# Patient Record
Sex: FEMALE | Race: BLACK OR AFRICAN AMERICAN | NOT HISPANIC OR LATINO | Employment: OTHER | URBAN - METROPOLITAN AREA
[De-identification: names, ages, dates, MRNs, and addresses within clinical notes are randomized per-mention and may not be internally consistent; named-entity substitution may affect disease eponyms.]

---

## 2017-01-18 ENCOUNTER — LAB VISIT (OUTPATIENT)
Dept: LAB | Facility: HOSPITAL | Age: 77
End: 2017-01-18
Attending: INTERNAL MEDICINE
Payer: MEDICARE

## 2017-01-18 ENCOUNTER — OFFICE VISIT (OUTPATIENT)
Dept: HEMATOLOGY/ONCOLOGY | Facility: CLINIC | Age: 77
End: 2017-01-18
Payer: MEDICARE

## 2017-01-18 ENCOUNTER — OFFICE VISIT (OUTPATIENT)
Dept: OPHTHALMOLOGY | Facility: CLINIC | Age: 77
End: 2017-01-18
Payer: MEDICARE

## 2017-01-18 VITALS
HEIGHT: 64 IN | BODY MASS INDEX: 28.34 KG/M2 | OXYGEN SATURATION: 98 % | TEMPERATURE: 98 F | WEIGHT: 166 LBS | RESPIRATION RATE: 18 BRPM | HEART RATE: 51 BPM | SYSTOLIC BLOOD PRESSURE: 128 MMHG | DIASTOLIC BLOOD PRESSURE: 78 MMHG

## 2017-01-18 DIAGNOSIS — H52.4 PRESBYOPIA: ICD-10-CM

## 2017-01-18 DIAGNOSIS — H04.123 DRY EYES, BILATERAL: ICD-10-CM

## 2017-01-18 DIAGNOSIS — Z85.3 HISTORY OF LEFT BREAST CANCER: ICD-10-CM

## 2017-01-18 DIAGNOSIS — H52.203 ASTIGMATISM, BILATERAL: ICD-10-CM

## 2017-01-18 DIAGNOSIS — H40.003 GLAUCOMA SUSPECT, BILATERAL: Primary | ICD-10-CM

## 2017-01-18 LAB
ALBUMIN SERPL BCP-MCNC: 3.5 G/DL
ALP SERPL-CCNC: 108 U/L
ALT SERPL W/O P-5'-P-CCNC: 13 U/L
ANION GAP SERPL CALC-SCNC: 11 MMOL/L
AST SERPL-CCNC: 19 U/L
BASOPHILS # BLD AUTO: 0.03 K/UL
BASOPHILS NFR BLD: 0.4 %
BILIRUB SERPL-MCNC: 0.3 MG/DL
BUN SERPL-MCNC: 16 MG/DL
CALCIUM SERPL-MCNC: 10.4 MG/DL
CHLORIDE SERPL-SCNC: 105 MMOL/L
CO2 SERPL-SCNC: 24 MMOL/L
CREAT SERPL-MCNC: 0.7 MG/DL
DIFFERENTIAL METHOD: ABNORMAL
EOSINOPHIL # BLD AUTO: 0.2 K/UL
EOSINOPHIL NFR BLD: 2.5 %
ERYTHROCYTE [DISTWIDTH] IN BLOOD BY AUTOMATED COUNT: 13.4 %
EST. GFR  (AFRICAN AMERICAN): >60 ML/MIN/1.73 M^2
EST. GFR  (NON AFRICAN AMERICAN): >60 ML/MIN/1.73 M^2
GLUCOSE SERPL-MCNC: 51 MG/DL
HCT VFR BLD AUTO: 39 %
HGB BLD-MCNC: 13.5 G/DL
LYMPHOCYTES # BLD AUTO: 3.1 K/UL
LYMPHOCYTES NFR BLD: 45.5 %
MCH RBC QN AUTO: 32.5 PG
MCHC RBC AUTO-ENTMCNC: 34.6 %
MCV RBC AUTO: 94 FL
MONOCYTES # BLD AUTO: 0.4 K/UL
MONOCYTES NFR BLD: 6.4 %
NEUTROPHILS # BLD AUTO: 3.1 K/UL
NEUTROPHILS NFR BLD: 45.2 %
PLATELET # BLD AUTO: 196 K/UL
PMV BLD AUTO: 10.6 FL
POTASSIUM SERPL-SCNC: 3.9 MMOL/L
PROT SERPL-MCNC: 7.6 G/DL
RBC # BLD AUTO: 4.15 M/UL
SODIUM SERPL-SCNC: 140 MMOL/L
WBC # BLD AUTO: 6.9 K/UL

## 2017-01-18 PROCEDURE — 1160F RVW MEDS BY RX/DR IN RCRD: CPT | Mod: S$GLB,,, | Performed by: INTERNAL MEDICINE

## 2017-01-18 PROCEDURE — 3078F DIAST BP <80 MM HG: CPT | Mod: S$GLB,,, | Performed by: INTERNAL MEDICINE

## 2017-01-18 PROCEDURE — 3074F SYST BP LT 130 MM HG: CPT | Mod: S$GLB,,, | Performed by: OPTOMETRIST

## 2017-01-18 PROCEDURE — 99999 PR PBB SHADOW E&M-EST. PATIENT-LVL IV: CPT | Mod: PBBFAC,,, | Performed by: INTERNAL MEDICINE

## 2017-01-18 PROCEDURE — 1126F AMNT PAIN NOTED NONE PRSNT: CPT | Mod: S$GLB,,, | Performed by: INTERNAL MEDICINE

## 2017-01-18 PROCEDURE — 92015 DETERMINE REFRACTIVE STATE: CPT | Mod: S$GLB,,, | Performed by: OPTOMETRIST

## 2017-01-18 PROCEDURE — 99999 PR PBB SHADOW E&M-EST. PATIENT-LVL I: CPT | Mod: PBBFAC,,, | Performed by: OPTOMETRIST

## 2017-01-18 PROCEDURE — 3074F SYST BP LT 130 MM HG: CPT | Mod: S$GLB,,, | Performed by: INTERNAL MEDICINE

## 2017-01-18 PROCEDURE — 99214 OFFICE O/P EST MOD 30 MIN: CPT | Mod: S$GLB,,, | Performed by: INTERNAL MEDICINE

## 2017-01-18 PROCEDURE — 3078F DIAST BP <80 MM HG: CPT | Mod: S$GLB,,, | Performed by: OPTOMETRIST

## 2017-01-18 PROCEDURE — 1157F ADVNC CARE PLAN IN RCRD: CPT | Mod: S$GLB,,, | Performed by: INTERNAL MEDICINE

## 2017-01-18 PROCEDURE — 1159F MED LIST DOCD IN RCRD: CPT | Mod: S$GLB,,, | Performed by: INTERNAL MEDICINE

## 2017-01-18 PROCEDURE — 92012 INTRM OPH EXAM EST PATIENT: CPT | Mod: S$GLB,,, | Performed by: OPTOMETRIST

## 2017-01-18 NOTE — PROGRESS NOTES
HPI     Pt states that she feels an aching in her head and she needs a new   glasses rx. No gtts.        Last edited by Leonardo Callahan, Patient Care Assistant on 1/18/2017 10:37   AM.         Assessment /Plan     For exam results, see Encounter Report.    Glaucoma suspect, bilateral  IOP within normal range today  Monitor 6 months    Dry eyes, bilateral  Recommended pt continue preservative free AT bid-more often PRN    Astigmatism, bilateral  Presbyopia  Eyeglass Final Rx     Eyeglass Final Rx      Sphere Cylinder Axis Add   Right +0.25 +0.50 110 +2.75   Left Merkel +0.50 039 +2.75       Type:  PAL    Expiration Date:  1/19/2018              RTC 6 months for dilation, 24-2VF, gOCT, PRN sooner if any changes or concerns  Recommended pt see PCP ASAP for head pain  Discussed above and all questions were answered.

## 2017-01-18 NOTE — PROGRESS NOTES
Subjective:       Patient ID: Danyelle Garcia is a 76 y.o. female.    Chief Complaint: Follow-up    HPI He is a 76year old  lady who comes for follow up of her previously diagnosed triple negative breast cancer.  on  was diagnosed as having cancer of the left breast and underwent  lumpectomy along with sentinel lymph node biopsy. The pathology report from  the [facility] indicated that the   patient had an invasive ductal carcinoma of the breast measuring 1.5 cm in   diameter.     The ER receptors and the HER-2 kris tests were negative.   She received 4 cycles of adjuvant AC which she tolerated well.  She had radiation therapy.  She has stable, chronic Bell's palsy  She also has HBP , diabetes and elevated cholesterol for which she takes medications    She has a history of chronic back pain which was followed outside the clinic.   She ahs not seen me since Aug 2015.  Says she has been having pains in the left breast ALLERGIES: No known drug allergies.     CURRENT MEDICATIONS: see med card.     PREVIOUS SURGERIES: Cholecystectomy, hysterectomy, lumpectomy and sentinel   lymph node biopsy on 3/17/05.     SOCIAL HISTORY: She is single. She has three children. She lives in [place]. She used to smoke for 20 years averaging 4 to 5 cigarettes a day and  stopped a month ago. She drinks about a six pack of beer a month. She used   to work in [employer].     FAMILY DISEASES: No diabetes or cancer in the family. Father  of a   heart attack.     PAST MEDICAL HISTORY:  1-Breast cancer s/p surgery. radiaion therapy and adjuvant chemo.  2-chronic stable Bell's palsy  3-diabetes mellitus  4-elevated cholesterol  Review of Systems   Constitutional: Negative.    HENT: Negative.    Eyes: Negative.    Respiratory: Negative.  Negative for cough and wheezing.    Cardiovascular: Negative.  Negative for chest pain.   Gastrointestinal: Negative.    Genitourinary:  Negative.    Neurological: Negative.    Psychiatric/Behavioral: Negative.         Objective:      Physical Exam   Constitutional: She is oriented to person, place, and time. She appears well-developed. No distress.   HENT:   Head: Normocephalic.   Right Ear: Tympanic membrane, external ear and ear canal normal.   Left Ear: Tympanic membrane, external ear and ear canal normal.   Nose: Nose normal. Right sinus exhibits no maxillary sinus tenderness and no frontal sinus tenderness. Left sinus exhibits no maxillary sinus tenderness and no frontal sinus tenderness.   Mouth/Throat: Oropharynx is clear and moist and mucous membranes are normal.   Teeth normal.  Gums normal.   Eyes: Conjunctivae and lids are normal. Pupils are equal, round, and reactive to light.   Neck: Normal carotid pulses, no hepatojugular reflux and no JVD present. Carotid bruit is not present. No tracheal deviation present. No thyroid mass and no thyromegaly present.   Cardiovascular: Normal rate, regular rhythm, S1 normal, S2 normal, normal heart sounds and intact distal pulses.  Exam reveals no gallop and no friction rub.    No murmur heard.  Carotid exam normal   Pulmonary/Chest: Effort normal and breath sounds normal. No accessory muscle usage. No respiratory distress. She has no wheezes. She has no rales. She exhibits no tenderness.   Breast exam unremarkable.   Abdominal: Soft. Normal appearance. She exhibits no distension and no mass. There is no splenomegaly or hepatomegaly. There is no tenderness. There is no rebound and no guarding.   Musculoskeletal: Normal range of motion. She exhibits no edema or tenderness.        Right hand: Normal.        Left hand: Normal.       Lymphadenopathy:     She has no cervical adenopathy.     She has no axillary adenopathy.        Right: No inguinal and no supraclavicular adenopathy present.        Left: No inguinal and no supraclavicular adenopathy present.   Neurological: She is alert and oriented to  person, place, and time. She has normal strength. No cranial nerve deficit. Coordination normal.   Chronic Millbury's Palsy   Skin: Skin is warm and dry. No rash noted. She is not diaphoretic. No cyanosis or erythema. No pallor. Nails show no clubbing.   Psychiatric: She has a normal mood and affect. Her behavior is normal. Judgment and thought content normal.       Wt Readings from Last 3 Encounters:   01/18/17 75.3 kg (166 lb 0.1 oz)   11/21/16 72 kg (158 lb 11.7 oz)   07/20/16 72.9 kg (160 lb 11.5 oz)     Temp Readings from Last 3 Encounters:   01/18/17 98.3 °F (36.8 °C) (Oral)   11/21/16 96.8 °F (36 °C) (Tympanic)   07/20/16 97 °F (36.1 °C) (Tympanic)     BP Readings from Last 3 Encounters:   01/18/17 128/78   11/21/16 134/62   07/20/16 140/72     Pulse Readings from Last 3 Encounters:   01/18/17 (!) 51   11/21/16 65   07/20/16 70       Assessment:       1. History of left breast cancer        Plan:       She is doing well. See me in 6 months with a cbc/cmp. Mammograms on April 2017 or so.

## 2017-01-19 ENCOUNTER — CLINICAL SUPPORT (OUTPATIENT)
Dept: SMOKING CESSATION | Facility: CLINIC | Age: 77
End: 2017-01-19
Payer: COMMERCIAL

## 2017-01-19 DIAGNOSIS — F17.200 NICOTINE DEPENDENCE: Primary | ICD-10-CM

## 2017-01-19 PROCEDURE — 99407 BEHAV CHNG SMOKING > 10 MIN: CPT | Mod: S$GLB,,, | Performed by: GENERAL PRACTICE

## 2017-02-02 RX ORDER — MOMETASONE FUROATE 50 UG/1
SPRAY, METERED NASAL
Qty: 17 G | Refills: 11 | Status: SHIPPED | OUTPATIENT
Start: 2017-02-02 | End: 2017-05-26 | Stop reason: SDUPTHER

## 2017-03-29 ENCOUNTER — LAB VISIT (OUTPATIENT)
Dept: LAB | Facility: HOSPITAL | Age: 77
End: 2017-03-29
Attending: FAMILY MEDICINE
Payer: MEDICARE

## 2017-03-29 ENCOUNTER — OFFICE VISIT (OUTPATIENT)
Dept: FAMILY MEDICINE | Facility: CLINIC | Age: 77
End: 2017-03-29
Payer: MEDICARE

## 2017-03-29 VITALS
TEMPERATURE: 96 F | HEART RATE: 72 BPM | RESPIRATION RATE: 18 BRPM | OXYGEN SATURATION: 97 % | WEIGHT: 164.44 LBS | DIASTOLIC BLOOD PRESSURE: 72 MMHG | SYSTOLIC BLOOD PRESSURE: 128 MMHG | HEIGHT: 64 IN | BODY MASS INDEX: 28.07 KG/M2

## 2017-03-29 DIAGNOSIS — Z00.00 ANNUAL PHYSICAL EXAM: Primary | ICD-10-CM

## 2017-03-29 DIAGNOSIS — F17.200 TOBACCO USE DISORDER: ICD-10-CM

## 2017-03-29 DIAGNOSIS — E11.59 HYPERTENSION ASSOCIATED WITH DIABETES: ICD-10-CM

## 2017-03-29 DIAGNOSIS — I15.2 HYPERTENSION ASSOCIATED WITH DIABETES: ICD-10-CM

## 2017-03-29 DIAGNOSIS — E11.51 TYPE 2 DIABETES MELLITUS WITH PERIPHERAL ARTERY DISEASE: ICD-10-CM

## 2017-03-29 DIAGNOSIS — E11.69 COMBINED HYPERLIPIDEMIA ASSOCIATED WITH TYPE 2 DIABETES MELLITUS: ICD-10-CM

## 2017-03-29 DIAGNOSIS — Z78.0 POSTMENOPAUSAL: ICD-10-CM

## 2017-03-29 DIAGNOSIS — E78.2 COMBINED HYPERLIPIDEMIA ASSOCIATED WITH TYPE 2 DIABETES MELLITUS: ICD-10-CM

## 2017-03-29 DIAGNOSIS — E11.49 CONTROLLED TYPE 2 DIABETES MELLITUS WITH OTHER NEUROLOGIC COMPLICATION, UNSPECIFIED LONG TERM INSULIN USE STATUS: ICD-10-CM

## 2017-03-29 DIAGNOSIS — E55.9 VITAMIN D DEFICIENCY: ICD-10-CM

## 2017-03-29 DIAGNOSIS — I73.9 PAD (PERIPHERAL ARTERY DISEASE): ICD-10-CM

## 2017-03-29 DIAGNOSIS — R41.3 MEMORY DEFICIT: ICD-10-CM

## 2017-03-29 DIAGNOSIS — M89.9 DISORDER OF BONE AND CARTILAGE: ICD-10-CM

## 2017-03-29 DIAGNOSIS — M94.9 DISORDER OF BONE AND CARTILAGE: ICD-10-CM

## 2017-03-29 DIAGNOSIS — E78.5 HYPERLIPIDEMIA, UNSPECIFIED HYPERLIPIDEMIA TYPE: ICD-10-CM

## 2017-03-29 DIAGNOSIS — I77.9 CAROTID ARTERY DISEASE, UNSPECIFIED LATERALITY: ICD-10-CM

## 2017-03-29 DIAGNOSIS — C50.912 MALIGNANT NEOPLASM OF LEFT FEMALE BREAST, UNSPECIFIED SITE OF BREAST: ICD-10-CM

## 2017-03-29 LAB
CREAT UR-MCNC: 67 MG/DL
PROT UR-MCNC: 7 MG/DL
PROT/CREAT RATIO, UR: 0.1

## 2017-03-29 PROCEDURE — 84443 ASSAY THYROID STIM HORMONE: CPT

## 2017-03-29 PROCEDURE — 82306 VITAMIN D 25 HYDROXY: CPT

## 2017-03-29 PROCEDURE — 99999 PR PBB SHADOW E&M-EST. PATIENT-LVL IV: CPT | Mod: PBBFAC,,, | Performed by: FAMILY MEDICINE

## 2017-03-29 PROCEDURE — 3074F SYST BP LT 130 MM HG: CPT | Mod: S$GLB,,, | Performed by: FAMILY MEDICINE

## 2017-03-29 PROCEDURE — 36415 COLL VENOUS BLD VENIPUNCTURE: CPT | Mod: PO

## 2017-03-29 PROCEDURE — 99397 PER PM REEVAL EST PAT 65+ YR: CPT | Mod: S$GLB,,, | Performed by: FAMILY MEDICINE

## 2017-03-29 PROCEDURE — 3078F DIAST BP <80 MM HG: CPT | Mod: S$GLB,,, | Performed by: FAMILY MEDICINE

## 2017-03-29 PROCEDURE — 83036 HEMOGLOBIN GLYCOSYLATED A1C: CPT

## 2017-03-29 PROCEDURE — 80061 LIPID PANEL: CPT

## 2017-03-29 RX ORDER — ATORVASTATIN CALCIUM 80 MG/1
80 TABLET, FILM COATED ORAL NIGHTLY
Qty: 90 TABLET | Refills: 1 | Status: SHIPPED | OUTPATIENT
Start: 2017-03-29 | End: 2017-11-01 | Stop reason: SDUPTHER

## 2017-03-29 RX ORDER — LOSARTAN POTASSIUM 50 MG/1
50 TABLET ORAL DAILY
Qty: 90 TABLET | Refills: 1 | Status: SHIPPED | OUTPATIENT
Start: 2017-03-29 | End: 2017-11-01 | Stop reason: SDUPTHER

## 2017-03-29 RX ORDER — GLIPIZIDE 5 MG/1
TABLET ORAL
Qty: 45 TABLET | Refills: 1 | Status: SHIPPED | OUTPATIENT
Start: 2017-03-29 | End: 2017-09-22 | Stop reason: SDUPTHER

## 2017-03-29 NOTE — PROGRESS NOTES
HISTORY OF PRESENT ILLNESS: Ms. Garcia comes in today non fasting and with taking medications for annual wellness exam.      END OF LIFE DECISION: She has a living will and does not desire life support.    She continues to follow with hematologist/oncologist Dr. Finley for breast cancer surveillance with breast exam and mammogram with last visit on January 18, 2017 with 6-month follow up with CBC, CMP advised.     She performs daily AM glucose checks with levels ranging 120's.    She also follows with Dr. Loomis, vascular surgeon for carotid artery disease surveillance, occasionally and reports last visit a few months ago.     She continues to smoke but sporadically and is not interested in smoking cessation program but states she is working on quitting.     Current Outpatient Prescriptions   Medication Sig    alcohol swabs PadM Apply 1 each topically as needed.    atorvastatin (LIPITOR) 80 MG tablet Take 1 tablet (80 mg total) by mouth nightly.    blood glucose control, normal (ACCU-CHEK SMARTVIEW CONTRL SOL) Soln 1 each by Misc.(Non-Drug; Combo Route) route 2 (two) times daily.    blood sugar diagnostic Strp 1 strip by Misc.(Non-Drug; Combo Route) route 2 (two) times daily.    blood-glucose meter kit Use as instructed    calcium carbonate (OS-DAMIAN) 600 mg (1,500 mg) Tab Take 600 mg by mouth 2 (two) times daily with meals.    glipiZIDE (GLUCOTROL) 5 MG tablet TAKE 1/2 TABLET BY MOUTH WITH BREAKFAST    lancets 32 gauge Misc 1 lancet by Misc.(Non-Drug; Combo Route) route 2 (two) times daily.    losartan (COZAAR) 50 MG tablet TAKE 1 TABLET BY MOUTH EVERY DAY    mometasone (NASONEX) 50 mcg/actuation nasal spray USE 2 SPRAYS IN EACH NOSTRIL EVERY DAY    nicotine (NICODERM CQ) 7 mg/24 hr Place 1 patch onto the skin once daily.    nystatin (MYCOSTATIN) cream Apply topically 2 (two) times daily as needed for Dry Skin.    pantoprazole (PROTONIX) 40 MG tablet Take 1 tablet (40 mg total) by mouth once daily.       SCREENINGS:   Cholesterol: April 6, 2016.  FFS/Colonoscopy: August 4, 2015 - benign colon polyp, diverticulosis; repeat in 3 years.  Mammogram (with Dr. Finley): April 6, 2016 - benign. Scheduled April 28, 2017.  GYN Exam (Breast exam) with Dr. Finley: January 18, 2017.   Dexa Scan: August 16, 2013 - osteopenia with moderate fracture risk; repeat in 5 years.   Eye Exam: January 18, 2017 with Dr. Cruz.   Dental Exam: Years ago per patient. She wears top dentures only.  PPD: Negative in the past.  Immunizations: Td/Tdap - less than 10 years ago per patient.  Gardasil - N./A.  Zostavax - June 14, 2012.  Pneumovax - March 26, 2014.  Prevnar-13 shot - March 29, 2016.  Seasonal Flu - November 21, 2016.    ROS:  GENERAL: Denies fever, chills, fatigue or unusual weight change. Weight 72 kg (158 lb 11.7 oz) at November 21, 2016 visit. Reports occasionally walks and monitors diet.   SKIN: Denies rashes, itching, changes in mole, color or texture of skin or easy bruising.  HEAD: Reports occasional, mild headaches but denies history of recent head trauma.  EYES: Denies change in vision, pain, diplopia, redness or discharge. Wears glasses.  EARS: Denies ear pain, discharge, vertigo or decreased hearing.   NOSE: Denies loss of smell, epistaxis or rhinitis.  MOUTH & THROAT: Denies hoarseness or change in voice. Denies excessive gum bleeding or mouth sores. Denies sore throat.  NODES: Denies swollen glands.  CHEST: Denies BRITO, wheezing, cough, or sputum production.  CARDIOVASCULAR: Denies PND, chest pain, orthopnea or reduced exercise tolerance. Denies palpitations. See history of present illness.  ABDOMEN: Denies constipation, diarrhea, nausea, vomiting, abdominal pain, or blood in stool.  URINARY: Denies flank pain, dysuria or hematuria.  GENITOURINARY: Denies flank pain, dysuria, frequency or hematuria. Performs monthly breast self exams. See history of present illness.  ENDOCRINE: Denies thyroid problems. See history  "of present illness.  HEME/LYMPH: Denies bleeding problems.  PERIPHERAL VASCULAR:Denies claudication or cyanosis.  MUSCULOSKELETAL: Denies pain, stiffness, edema.  NEUROLOGIC: Denies history of seizures, tremors, paralysis, alteration of gait or coordination. She reports having memory issue.  PSYCHIATRIC: Denies mood swings, depression, anxiety, homicidal or suicidal thoughts. Denies sleep problems.    PE:   VS: /72 (BP Location: Left arm, Patient Position: Sitting, BP Method: Manual)  Pulse 72  Temp 96.3 °F (35.7 °C) (Tympanic)   Resp 18  Ht 5' 4" (1.626 m)  Wt 74.6 kg (164 lb 7.4 oz)  SpO2 97%  BMI 28.23 kg/m2  APPEARANCE: Well nourished, well developed female, overweight, elderly and pleasant, alert and oriented in no acute distress.   HEAD: Nontender. Full range of motion.  EYES: PERRL, conjunctiva pink, lids no edema. She wears glasses.   EARS: External canal patent, no swelling or redness. TM's shiny and clear.  NOSE: Mucosa and turbinates pink, not swollen. No discharge. Nontender sinuses.  THROAT: No pharyngeal erythema or exudate. No stridor. She has top dentures.   NECK: Supple, no mass, thyroid not enlarged.  NODES: No cervical lymph node enlargement.  CHEST: Normal respiratory effort. Lungs clear to auscultation.  CARDIOVASCULAR: Normal S1, S2. No rubs, murmurs or gallops. PMI not displaced. Chronic left carotid bruit. Feet look okay without ulcerations or skin breaks. Chronic slightly decreased pedal pulses palpable bilaterally. No edema.  ABDOMEN: Bowel sounds present. Not distended. Soft. No tenderness, masses or organomegaly.  BREAST EXAM: Not examined as already performed by hematologist/oncologist.   PELVIC EXAM: Bimanual examination not examined as patient has had JACQUELINE/BSO due to noncancerous reasons. However, normal external female genitalia without skin irritation noted.  RECTAL EXAM: No external hemorrhoids or anal fissures. Heme-negative stool in the rectal vault.  MUSCULOSKELETAL: " No joint deformities or stiffness. She is ambulatory without problems.  SKIN: No rashes or suspicious lesions, normal color and turgor.  NEUROLOGIC: Cranial Nerves: II-XII grossly intact. DTR's: Knees, Ankles 2+ and equal bilaterally. Monofilament test unremarkable. Gait & Posture: Normal gait and fine motion.  PSYCHIATRIC: Patient alert, oriented x 3. Mood/Affect normal without acute anxiety depression noted. Judgment/insight good as she makes appropriate decisions during today's examination but slight decreased memory deficit noted.    Protective Sensation (w/ 10 gram monofilament):  Right: Intact  Left: Intact    Visual Inspection:  Normal -  Bilateral    Pedal Pulses:   Right: Diminshed  Left: Diminshed    Posterior tibialis:   Right:Diminshed  Left: Diminshed     Lab Results   Component Value Date    WBC 6.90 01/18/2017    HGB 13.5 01/18/2017    HCT 39.0 01/18/2017    MCV 94 01/18/2017     01/18/2017     Lab Results   Component Value Date    CREATININE 0.7 01/18/2017    BUN 16 01/18/2017     01/18/2017    K 3.9 01/18/2017     01/18/2017    CO2 24 01/18/2017     Lab Results   Component Value Date    ALT 13 01/18/2017    AST 19 01/18/2017    ALKPHOS 108 01/18/2017    BILITOT 0.3 01/18/2017     Lab Results   Component Value Date    HGBA1C 5.8 11/21/2016     Glucose 70 - 110 mg/dL 51 (L)              01/18//2017       ASSESSMENT:    ICD-10-CM ICD-9-CM    1. Annual physical exam Z00.00 V70.0    2. Hypertension associated with diabetes E11.59 250.80 Lipid panel    I10 401.9 TSH   3. Combined hyperlipidemia associated with type 2 diabetes mellitus E11.69 250.80 Lipid panel    E78.2 272.2    4. Type 2 diabetes mellitus with peripheral artery disease E11.51 250.70 Protein / creatinine ratio, urine     443.81 Hemoglobin A1c   5. PAD (peripheral artery disease) I73.9 443.9    6. Controlled type 2 diabetes mellitus with other neurologic complication, unspecified long term insulin use status E11.49 250.60     7. Vitamin D deficiency E55.9 268.9 Vitamin D   8. Disorder of bone and cartilage M89.9 733.90     M94.9     9. Carotid artery disease, unspecified laterality I77.9 447.9    10. Malignant neoplasm of left female breast, unspecified site of breast C50.912 174.9    11. Tobacco use disorder F17.200 305.1    12. Memory deficit R41.3 780.93 Ambulatory referral to Neurology   13. Postmenopausal Z78.0 V49.81      PLAN:  1. Age-appropriate counseling-appropriate low-sodium, low-cholesterol, low chloride diet and exercise/walks daily as tolerated, monthly breast self exam, annual health screening.  2. Flu shot this fall.  3. Patient advised to call for results.  4. Smoking cessation advised.  5. Keep followup with specialists-vascular surgery, hematology/oncology (for breast cancer surveillance).  6. See me in 3 months for diabetes and hypertension followup.  7. Prescription refills - Lipitor 80 mg nightly, Cozaar 50 mg daily, Glipizide 5 mg daily x 6 months.

## 2017-03-29 NOTE — MR AVS SNAPSHOT
[facility]  [address] [address]  Phone: [phone]                  Danyelle Garcia   3/29/2017 10:30 AM   Office Visit    Description:  Female : 1940   Provider:  Dilma Chan MD   Department:  [facility]           Reason for Visit     Annual Exam           Diagnoses this Visit        Comments    Annual physical exam    -  Primary     Hypertension associated with diabetes         Combined hyperlipidemia associated with type 2 diabetes mellitus         Type 2 diabetes mellitus with peripheral artery disease         PAD (peripheral artery disease)         Controlled type 2 diabetes mellitus with other neurologic complication, unspecified long term insulin use status         Vitamin D deficiency         Carotid artery disease, unspecified laterality         Tobacco use disorder         Malignant neoplasm of left female breast, unspecified site of breast         Postmenopausal         Memory deficit         Hyperlipidemia, unspecified hyperlipidemia type                To Do List           Future Appointments        Provider Department Dept Phone    3/29/2017 11:50 AM LABORATORY, [address] [facility] [phone]    2017 9:00 AM [facility] [phone]    2017 8:00 AM Zoila Mason MD [facility] Neurology [phone]    2017 8:15 AM Dilma Chan MD [facility] [phone]    2017 9:00 AM LABORATORY, [facility] [phone]      Goals (5 Years of Data)     None      Follow-Up and Disposition     Return in about 3 months (around 2017) for diabetes, blood pressure follow up.       These Medications        Disp Refills Start End    atorvastatin (LIPITOR) 80 MG tablet 90 tablet 1 3/29/2017     Take 1 tablet (80 mg total) by mouth nightly. - Oral    Pharmacy: [facility] [id number] [place] [address]  [place] - [address] AT [facility] Ph #: [phone]       Notes to Pharmacy: **Patient requests 90 day supply**    glipiZIDE (GLUCOTROL) 5 MG tablet 45 tablet 1 3/29/2017     TAKE 1/2 TABLET BY MOUTH WITH BREAKFAST    [facility] [address] AT [facility] Ph #: [phone]       losartan (COZAAR) 50 MG tablet 90 tablet 1 3/29/2017     Take 1 tablet (50 mg total) by mouth once daily. - Oral    [facility] [facility] [address] AT [facility] Ph #: [phone]         [facility] On Call     [facility] On Call Nurse Care Line - 24/7 Assistance  Registered nurses in the [facility] provide clinical advisement, health education, appointment booking, and other advisory services.  Call for this free service at [phone].             Medications           Message regarding Medications     Verify the changes and/or additions to your medication regime listed below are the same as discussed with your clinician today.  If any of these changes or additions are incorrect, please notify your healthcare provider.        CHANGE how you are taking these medications     Start Taking Instead of    losartan (COZAAR) 50 MG tablet losartan (COZAAR) 50 MG tablet    Dosage:  Take 1 tablet (50 mg total) by mouth once daily. Dosage:  TAKE 1 TABLET BY MOUTH EVERY DAY    Reason for Change:  Reorder            Verify that the below list of medications is an accurate representation of the medications you are currently taking.  If none reported, the list may be blank. If incorrect, please contact your healthcare provider. Carry this list with you in case of emergency.           Current Medications     alcohol swabs PadM Apply 1 each topically as needed.    atorvastatin (LIPITOR) 80 MG tablet Take 1 tablet (80 mg total) by mouth nightly.    blood glucose control, normal (ACCU-CHEK SMARTVIEW CONTRL SOL) Soln 1 each by  "Misc.(Non-Drug; Combo Route) route 2 (two) times daily.    blood sugar diagnostic Strp 1 strip by Misc.(Non-Drug; Combo Route) route 2 (two) times daily.    blood-glucose meter kit Use as instructed    calcium carbonate (OS-DAMIAN) 600 mg (1,500 mg) Tab Take 600 mg by mouth 2 (two) times daily with meals.    glipiZIDE (GLUCOTROL) 5 MG tablet TAKE 1/2 TABLET BY MOUTH WITH BREAKFAST    lancets 32 gauge Misc 1 lancet by Misc.(Non-Drug; Combo Route) route 2 (two) times daily.    losartan (COZAAR) 50 MG tablet Take 1 tablet (50 mg total) by mouth once daily.    mometasone (NASONEX) 50 mcg/actuation nasal spray USE 2 SPRAYS IN EACH NOSTRIL EVERY DAY    nicotine (NICODERM CQ) 7 mg/24 hr Place 1 patch onto the skin once daily.    nystatin (MYCOSTATIN) cream Apply topically 2 (two) times daily as needed for Dry Skin.    pantoprazole (PROTONIX) 40 MG tablet Take 1 tablet (40 mg total) by mouth once daily.           Clinical Reference Information           Your Vitals Were     BP Pulse Temp Resp    128/72 (BP Location: Left arm, Patient Position: Sitting, BP Method: Manual) 72 96.3 °F (35.7 °C) (Tympanic) 18    Height Weight SpO2 BMI    5' 4" (1.626 m) 74.6 kg (164 lb 7.4 oz) 97% 28.23 kg/m2      Blood Pressure          Most Recent Value    BP  128/72      Allergies as of 3/29/2017     No Known Allergies      Immunizations Administered on Date of Encounter - 3/29/2017     None      Orders Placed During Today's Visit      Normal Orders This Visit    Ambulatory referral to Neurology     Protein / creatinine ratio, urine     Future Labs/Procedures Expected by Expires    Hemoglobin A1c  3/29/2017 5/28/2018    Lipid panel  3/29/2017 5/28/2018    TSH  3/29/2017 5/28/2018    Vitamin D  3/29/2017 5/28/2018      [facility] Sign-Up     Activating your [facility] account is as easy as 1-2-3!     1) Visit [email or web address], select Sign Up Now, enter this activation code and your date of birth, then select Next.  [email or web address]  Expires: " 5/13/2017 11:48 AM      2) Create a username and password to use when you visit [facility] in the future and select a security question in case you lose your password and select Next.    3) Enter your e-mail address and click Sign Up!    Additional Information  If you have questions, please e-mail [email or web address] or call [phone] to talk to our [facility] staff. Remember, [facility] is NOT to be used for urgent needs. For medical emergencies, dial 911.         Instructions    Please call Dr. Chan for your results.    Thanks.       Smoking Cessation     If you would like to quit smoking:   You may be eligible for free services if you are a [place] resident and started smoking cigarettes before September 1, 1988.  Call the Smoking Cessation [employer] (SCT) toll free at (201) 330-6678 or (682) 039-0038.   Call 0-321-QUIT-NOW if you do not meet the above criteria.            Language Assistance Services     ATTENTION: Language assistance services are available, free of charge. Please call [phone].      ATENCIÓN: Si habla luis miguel, tiene a al disposición servicios gratuitos de asistencia lingüística. Llame al [phone].     CHÚ Ý: N?u b?n nói Ti?ng Vi?t, có các d?ch v? h? tr? ngôn ng? mi?n phí dành cho b?n. G?i s? [phone].         [facility] complies with applicable Federal civil rights laws and does not discriminate on the basis of race, color, national origin, age, disability, or sex.

## 2017-03-30 LAB
25(OH)D3+25(OH)D2 SERPL-MCNC: 20 NG/ML
CHOLEST/HDLC SERPL: 3.7 {RATIO}
ESTIMATED AVG GLUCOSE: 126 MG/DL
HBA1C MFR BLD HPLC: 6 %
HDL/CHOLESTEROL RATIO: 27.1 %
HDLC SERPL-MCNC: 210 MG/DL
HDLC SERPL-MCNC: 57 MG/DL
LDLC SERPL CALC-MCNC: 133.2 MG/DL
NONHDLC SERPL-MCNC: 153 MG/DL
TRIGL SERPL-MCNC: 99 MG/DL
TSH SERPL DL<=0.005 MIU/L-ACNC: 1.25 UIU/ML

## 2017-04-05 PROBLEM — C50.912 MALIGNANT NEOPLASM OF LEFT FEMALE BREAST: Status: ACTIVE | Noted: 2017-04-05

## 2017-04-05 PROBLEM — R41.3 MEMORY DEFICIT: Status: ACTIVE | Noted: 2017-04-05

## 2017-04-05 PROBLEM — I77.9 CAROTID ARTERY DISEASE: Status: ACTIVE | Noted: 2017-04-05

## 2017-04-05 RX ORDER — ERGOCALCIFEROL 1.25 MG/1
50000 CAPSULE ORAL
Qty: 4 CAPSULE | Refills: 5 | Status: SHIPPED | OUTPATIENT
Start: 2017-04-05 | End: 2017-05-26

## 2017-04-28 ENCOUNTER — HOSPITAL ENCOUNTER (OUTPATIENT)
Dept: RADIOLOGY | Facility: HOSPITAL | Age: 77
Discharge: HOME | End: 2017-04-28
Attending: INTERNAL MEDICINE
Payer: MEDICARE

## 2017-04-28 DIAGNOSIS — Z85.3 HISTORY OF LEFT BREAST CANCER: ICD-10-CM

## 2017-05-10 ENCOUNTER — CLINICAL SUPPORT (OUTPATIENT)
Dept: SMOKING CESSATION | Facility: CLINIC | Age: 77
End: 2017-05-10
Payer: COMMERCIAL

## 2017-05-10 DIAGNOSIS — F17.200 NICOTINE DEPENDENCE: Primary | ICD-10-CM

## 2017-05-10 PROCEDURE — 99407 BEHAV CHNG SMOKING > 10 MIN: CPT | Mod: S$GLB,,, | Performed by: INTERNAL MEDICINE

## 2017-05-24 ENCOUNTER — TELEPHONE (OUTPATIENT)
Dept: FAMILY MEDICINE | Facility: CLINIC | Age: 77
End: 2017-05-24

## 2017-05-24 NOTE — TELEPHONE ENCOUNTER
----- Message from Madisyn Shah sent at 5/24/2017  8:33 AM CDT -----  Contact: pt  Please call pt @ [phone] regarding worked in appt before 6/29, pt do not want to see no one else, because they gave her the wrong meds before, pt only want to see her doctor.

## 2017-05-26 ENCOUNTER — OFFICE VISIT (OUTPATIENT)
Dept: FAMILY MEDICINE | Facility: CLINIC | Age: 77
End: 2017-05-26
Payer: MEDICARE

## 2017-05-26 VITALS
OXYGEN SATURATION: 96 % | DIASTOLIC BLOOD PRESSURE: 72 MMHG | RESPIRATION RATE: 18 BRPM | HEIGHT: 64 IN | SYSTOLIC BLOOD PRESSURE: 146 MMHG | HEART RATE: 62 BPM | WEIGHT: 161.63 LBS | TEMPERATURE: 98 F | BODY MASS INDEX: 27.59 KG/M2

## 2017-05-26 DIAGNOSIS — F17.200 TOBACCO USE DISORDER: ICD-10-CM

## 2017-05-26 DIAGNOSIS — J30.2 SEASONAL ALLERGIC RHINITIS, UNSPECIFIED ALLERGIC RHINITIS TRIGGER: ICD-10-CM

## 2017-05-26 DIAGNOSIS — K21.9 GASTROESOPHAGEAL REFLUX DISEASE, ESOPHAGITIS PRESENCE NOT SPECIFIED: ICD-10-CM

## 2017-05-26 DIAGNOSIS — E55.9 VITAMIN D DEFICIENCY: ICD-10-CM

## 2017-05-26 PROCEDURE — 99999 PR PBB SHADOW E&M-EST. PATIENT-LVL IV: CPT | Mod: PBBFAC,,, | Performed by: FAMILY MEDICINE

## 2017-05-26 PROCEDURE — 1126F AMNT PAIN NOTED NONE PRSNT: CPT | Mod: S$GLB,,, | Performed by: FAMILY MEDICINE

## 2017-05-26 PROCEDURE — 99214 OFFICE O/P EST MOD 30 MIN: CPT | Mod: S$GLB,,, | Performed by: FAMILY MEDICINE

## 2017-05-26 PROCEDURE — 1159F MED LIST DOCD IN RCRD: CPT | Mod: S$GLB,,, | Performed by: FAMILY MEDICINE

## 2017-05-26 RX ORDER — MOMETASONE FUROATE 50 UG/1
SPRAY, METERED NASAL
Qty: 17 G | Refills: 11 | Status: SHIPPED | OUTPATIENT
Start: 2017-05-26 | End: 2017-09-27

## 2017-05-26 RX ORDER — PANTOPRAZOLE SODIUM 40 MG/1
40 TABLET, DELAYED RELEASE ORAL DAILY
Qty: 90 TABLET | Refills: 1 | Status: SHIPPED | OUTPATIENT
Start: 2017-05-26 | End: 2018-01-30 | Stop reason: SDUPTHER

## 2017-05-26 RX ORDER — NYSTATIN 100000 U/G
CREAM TOPICAL 2 TIMES DAILY PRN
Qty: 30 G | Refills: 1 | Status: SHIPPED | OUTPATIENT
Start: 2017-05-26 | End: 2018-03-02

## 2017-05-26 NOTE — PROGRESS NOTES
Subjective:       Patient ID: Danyelle Garcia is a 76 y.o. female.    Chief Complaint: Medication Refill    Ms. Garcia comes in today requesting refill of medications - Nasonex, nystatin.    She reports having occasional stomach pain without nausea, vomiting, diarrhea, constipation.  She states she needs Protonix.    She states she has not been able to afford vitamin D 50,000 units weekly which was initially prescribed on April 5, 2017; she states she did not get it filled.    She denies having cough, shortness of breath, wheezing, chest pain, palpitations, leg swelling.      Current Outpatient Prescriptions:  alcohol swabs PadM, Apply 1 each topically as needed.  atorvastatin (LIPITOR) 80 MG tablet, Take 1 tablet (80 mg total) by mouth nightly.  blood glucose control, normal (ACCU-CHEK SMARTVIEW CONTRL SOL) Soln, 1 each by Misc.(Non-Drug; Combo Route) route 2 (two) times daily.  blood sugar diagnostic Strp, 1 strip by Misc.(Non-Drug; Combo Route) route 2 (two) times daily.  blood-glucose meter kit, Use as instructed  calcium carbonate (OS-DAMIAN) 600 mg (1,500 mg) Tab, Take 600 mg by mouth 2 (two) times daily with meals.  ergocalciferol (ERGOCALCIFEROL) 50,000 unit Cap, Take 1 capsule (50,000 Units total) by mouth every 7 days.  glipiZIDE (GLUCOTROL) 5 MG tablet, TAKE 1/2 TABLET BY MOUTH WITH BREAKFAST  lancets 32 gauge Misc, 1 lancet by Misc.(Non-Drug; Combo Route) route 2 (two) times daily.  losartan (COZAAR) 50 MG tablet, Take 1 tablet (50 mg total) by mouth once daily.  mometasone (NASONEX) 50 mcg/actuation nasal spray, USE 2 SPRAYS IN EACH NOSTRIL EVERY DAY  nicotine (NICODERM CQ) 7 mg/24 hr, Place 1 patch onto the skin once daily.  nystatin (MYCOSTATIN) cream, Apply topically 2 (two) times daily as needed for Dry Skin.  pantoprazole (PROTONIX) 40 MG tablet, Take 1 tablet (40 mg total) by mouth once daily.      Labs:                  WBC                      6.90                01/18/2017                 HGB                       13.5                01/18/2017                 HCT                      39.0                01/18/2017                 PLT                      196                 01/18/2017                 LDLCALC                  133.2               03/29/2017                          CHOL                     210 (H)             03/29/2017                 TRIG                     99                  03/29/2017                 HDL                      57                  03/29/2017                 ALT                      13                  01/18/2017                 AST                      19                  01/18/2017                 NA                       140                 01/18/2017                 K                        3.9                 01/18/2017                 CL                       105                 01/18/2017                 CREATININE               0.7                 01/18/2017                 BUN                      16                  01/18/2017                 CO2                      24                  01/18/2017                 TSH                      1.249               03/29/2017                 HGBA1C                   6.0                 03/29/2017       Vit D, 25-Hydroxy                 20                03/29/2017      Review of Systems   HENT:        See history of present illness.   Respiratory: Negative for cough, shortness of breath and wheezing.    Cardiovascular: Negative for chest pain, palpitations and leg swelling.   Gastrointestinal: Positive for abdominal pain. Negative for constipation, diarrhea, nausea and vomiting.        See history of present illness.   Endocrine:        See history of present illness.   Skin:        See history of present illness.       Objective:      Physical Exam   Constitutional: She is oriented to person, place, and time. She appears well-developed and well-nourished. No distress.   Elderly and pleasant.   Neck: Normal range of  motion. Neck supple. No thyromegaly present.   Cardiovascular: Normal rate and regular rhythm.    Chronic decreased pedal pulses.   Pulmonary/Chest: Effort normal and breath sounds normal. No respiratory distress. She has no wheezes. Left breast exhibits no inverted nipple, no mass, no nipple discharge, no skin change and no tenderness. There is no breast swelling.   Abdominal: Soft. Bowel sounds are normal. She exhibits no distension and no mass. There is no tenderness. There is no rebound and no guarding.   Genitourinary: No breast tenderness or discharge.   Musculoskeletal: Normal range of motion. She exhibits no edema or tenderness.   She is ambulatory without problems. Feet look okay without ulcerations or skin breaks.    Lymphadenopathy:     She has no cervical adenopathy.     She has no axillary adenopathy.   Neurological: She is alert and oriented to person, place, and time.   Skin: No rash noted. She is not diaphoretic. No erythema.   Healed follicles suprapubic area.   Psychiatric: She has a normal mood and affect. Her behavior is normal. Judgment and thought content normal.   Vitals reviewed.      Assessment:       1. Vitamin D deficiency    2. Gastroesophageal reflux disease, esophagitis presence not specified    3. Seasonal allergic rhinitis, unspecified allergic rhinitis trigger    4. Tobacco use disorder        Plan:       1.  Continue current medications, follow low sodium, low cholesterol, low carb diet, daily walks.  2.  Start OTC Vitamin D 5000 units daily.  3.  Prescription refills -  Nystatin cream - apply twice daily prn, #30, 1 refill; Protonix 40 mg daily prn, #90, 1 refill; Nasonex 2 sprays each nostril daily prn, #1, 5 refill.  4.  Keep 6/29/2017 scheduled appointment with me for vitamin D deficiency, diabetes and hypertension follow up.  5.  Keep appointment for smoking cessation counseling.

## 2017-06-06 ENCOUNTER — TELEPHONE (OUTPATIENT)
Dept: SMOKING CESSATION | Facility: CLINIC | Age: 77
End: 2017-06-06

## 2017-07-18 ENCOUNTER — LAB VISIT (OUTPATIENT)
Dept: LAB | Facility: HOSPITAL | Age: 77
End: 2017-07-18
Attending: INTERNAL MEDICINE
Payer: MEDICARE

## 2017-07-18 ENCOUNTER — OFFICE VISIT (OUTPATIENT)
Dept: HEMATOLOGY/ONCOLOGY | Facility: CLINIC | Age: 77
End: 2017-07-18
Payer: MEDICARE

## 2017-07-18 VITALS
RESPIRATION RATE: 16 BRPM | HEART RATE: 43 BPM | OXYGEN SATURATION: 99 % | BODY MASS INDEX: 28.49 KG/M2 | DIASTOLIC BLOOD PRESSURE: 52 MMHG | SYSTOLIC BLOOD PRESSURE: 148 MMHG | WEIGHT: 166.88 LBS | HEIGHT: 64 IN | TEMPERATURE: 99 F

## 2017-07-18 DIAGNOSIS — Z17.0 MALIGNANT NEOPLASM OF LEFT BREAST IN FEMALE, ESTROGEN RECEPTOR POSITIVE, UNSPECIFIED SITE OF BREAST: Primary | ICD-10-CM

## 2017-07-18 DIAGNOSIS — C50.912 MALIGNANT NEOPLASM OF LEFT BREAST IN FEMALE, ESTROGEN RECEPTOR POSITIVE, UNSPECIFIED SITE OF BREAST: Primary | ICD-10-CM

## 2017-07-18 DIAGNOSIS — Z85.3 HISTORY OF LEFT BREAST CANCER: ICD-10-CM

## 2017-07-18 LAB
ALBUMIN SERPL BCP-MCNC: 3.5 G/DL
ALP SERPL-CCNC: 121 U/L
ALT SERPL W/O P-5'-P-CCNC: 13 U/L
ANION GAP SERPL CALC-SCNC: 8 MMOL/L
AST SERPL-CCNC: 16 U/L
BASOPHILS # BLD AUTO: 0.03 K/UL
BASOPHILS NFR BLD: 0.5 %
BILIRUB SERPL-MCNC: 0.4 MG/DL
BUN SERPL-MCNC: 12 MG/DL
CALCIUM SERPL-MCNC: 10.2 MG/DL
CHLORIDE SERPL-SCNC: 104 MMOL/L
CO2 SERPL-SCNC: 26 MMOL/L
CREAT SERPL-MCNC: 0.8 MG/DL
DIFFERENTIAL METHOD: ABNORMAL
EOSINOPHIL # BLD AUTO: 0.2 K/UL
EOSINOPHIL NFR BLD: 2.7 %
ERYTHROCYTE [DISTWIDTH] IN BLOOD BY AUTOMATED COUNT: 13.6 %
EST. GFR  (AFRICAN AMERICAN): >60 ML/MIN/1.73 M^2
EST. GFR  (NON AFRICAN AMERICAN): >60 ML/MIN/1.73 M^2
GLUCOSE SERPL-MCNC: 75 MG/DL
HCT VFR BLD AUTO: 37.6 %
HGB BLD-MCNC: 12.9 G/DL
LYMPHOCYTES # BLD AUTO: 2.5 K/UL
LYMPHOCYTES NFR BLD: 44.1 %
MCH RBC QN AUTO: 32.4 PG
MCHC RBC AUTO-ENTMCNC: 34.3 %
MCV RBC AUTO: 95 FL
MONOCYTES # BLD AUTO: 0.4 K/UL
MONOCYTES NFR BLD: 7 %
NEUTROPHILS # BLD AUTO: 2.5 K/UL
NEUTROPHILS NFR BLD: 45.7 %
PLATELET # BLD AUTO: 181 K/UL
PMV BLD AUTO: 10.5 FL
POTASSIUM SERPL-SCNC: 4.1 MMOL/L
PROT SERPL-MCNC: 7.7 G/DL
RBC # BLD AUTO: 3.98 M/UL
SODIUM SERPL-SCNC: 138 MMOL/L
WBC # BLD AUTO: 5.56 K/UL

## 2017-07-18 PROCEDURE — 99999 PR PBB SHADOW E&M-EST. PATIENT-LVL IV: CPT | Mod: PBBFAC,,, | Performed by: INTERNAL MEDICINE

## 2017-07-18 PROCEDURE — 1159F MED LIST DOCD IN RCRD: CPT | Mod: S$GLB,,, | Performed by: INTERNAL MEDICINE

## 2017-07-18 PROCEDURE — 1126F AMNT PAIN NOTED NONE PRSNT: CPT | Mod: S$GLB,,, | Performed by: INTERNAL MEDICINE

## 2017-07-18 PROCEDURE — 99215 OFFICE O/P EST HI 40 MIN: CPT | Mod: S$GLB,,, | Performed by: INTERNAL MEDICINE

## 2017-07-19 ENCOUNTER — OFFICE VISIT (OUTPATIENT)
Dept: OPHTHALMOLOGY | Facility: CLINIC | Age: 77
End: 2017-07-19
Payer: MEDICARE

## 2017-07-19 DIAGNOSIS — E11.9 TYPE 2 DIABETES MELLITUS WITHOUT RETINOPATHY: ICD-10-CM

## 2017-07-19 DIAGNOSIS — H40.003 GLAUCOMA SUSPECT, BILATERAL: Primary | ICD-10-CM

## 2017-07-19 DIAGNOSIS — H25.12 NUCLEAR SCLEROSIS, LEFT: ICD-10-CM

## 2017-07-19 DIAGNOSIS — Z96.1 PSEUDOPHAKIA OF RIGHT EYE: ICD-10-CM

## 2017-07-19 DIAGNOSIS — H04.123 DRY EYES, BILATERAL: ICD-10-CM

## 2017-07-19 PROCEDURE — 92014 COMPRE OPH EXAM EST PT 1/>: CPT | Mod: S$GLB,,, | Performed by: OPTOMETRIST

## 2017-07-19 PROCEDURE — 99999 PR PBB SHADOW E&M-EST. PATIENT-LVL I: CPT | Mod: PBBFAC,,, | Performed by: OPTOMETRIST

## 2017-07-19 PROCEDURE — 92083 EXTENDED VISUAL FIELD XM: CPT | Mod: S$GLB,,, | Performed by: OPTOMETRIST

## 2017-07-19 PROCEDURE — 92133 CPTRZD OPH DX IMG PST SGM ON: CPT | Mod: S$GLB,,, | Performed by: OPTOMETRIST

## 2017-07-19 NOTE — PROGRESS NOTES
HPI     Last seen by DNL on 1/18/17 for Glaucoma Suspect. Patient here today for   DFE, HVF, gOCT, and IOP Check.  PT was last seen on 1/18/17 with DNL for IOP Check. PT was told to RTC  6 months for DFE, HVF, gOCT and IOP check.  Medication eye drops if any: none  Last HVF: 7/19/17  Last gOCT: 7/19/17      Last edited by Javier Cruz, OD on 7/19/2017 11:39 AM. (History)            Assessment /Plan     For exam results, see Encounter Report.    Glaucoma suspect, bilateral  -     Posterior Segment OCT Optic Nerve- Both eyes  -     Serrano Visual Field - OU - Extended - Both Eyes  IOP stable today and within acceptable range OU  No OAG defects on VF today  No progression on gOCT tdoay  Monitor 6 months    Type 2 diabetes mellitus without retinopathy  No diabetic retinopathy in either eye today. Reviewed importance of yearly dilated eye exams. Continue close care with PCP regarding diabetes.    Pseudophakia of right eye    Nuclear sclerosis, left  Good va with specs today  Surgery is not indicated at this time. Monitor 12 months.    Dry eyes, bilateral  systane ultra or refresh optive bid OU  Discussed rx options if symptoms persist or worsen  Pt to call or RTC      RTC 6 months for IOP check or PRN   Discussed above and all questions were answered.

## 2017-08-10 ENCOUNTER — HOSPITAL ENCOUNTER (OUTPATIENT)
Dept: RADIOLOGY | Facility: HOSPITAL | Age: 77
Discharge: HOME | End: 2017-08-10
Attending: INTERNAL MEDICINE
Payer: MEDICARE

## 2017-08-10 VITALS — BODY MASS INDEX: 28.34 KG/M2 | HEIGHT: 64 IN | WEIGHT: 166 LBS

## 2017-08-10 DIAGNOSIS — Z12.31 VISIT FOR SCREENING MAMMOGRAM: ICD-10-CM

## 2017-08-10 DIAGNOSIS — C50.912 MALIGNANT NEOPLASM OF LEFT BREAST IN FEMALE, ESTROGEN RECEPTOR POSITIVE, UNSPECIFIED SITE OF BREAST: ICD-10-CM

## 2017-08-10 DIAGNOSIS — Z17.0 MALIGNANT NEOPLASM OF LEFT BREAST IN FEMALE, ESTROGEN RECEPTOR POSITIVE, UNSPECIFIED SITE OF BREAST: ICD-10-CM

## 2017-08-10 PROCEDURE — 77067 SCR MAMMO BI INCL CAD: CPT | Mod: 26,,, | Performed by: RADIOLOGY

## 2017-08-10 PROCEDURE — 77063 BREAST TOMOSYNTHESIS BI: CPT | Mod: 26,,, | Performed by: RADIOLOGY

## 2017-08-10 PROCEDURE — 77067 SCR MAMMO BI INCL CAD: CPT | Mod: TC

## 2017-09-25 RX ORDER — GLIPIZIDE 5 MG/1
TABLET ORAL
Qty: 45 TABLET | Refills: 0 | Status: SHIPPED | OUTPATIENT
Start: 2017-09-25 | End: 2018-07-10 | Stop reason: SDUPTHER

## 2017-09-27 ENCOUNTER — LAB VISIT (OUTPATIENT)
Dept: LAB | Facility: HOSPITAL | Age: 77
End: 2017-09-27
Attending: FAMILY MEDICINE
Payer: MEDICARE

## 2017-09-27 ENCOUNTER — OFFICE VISIT (OUTPATIENT)
Dept: FAMILY MEDICINE | Facility: CLINIC | Age: 77
End: 2017-09-27
Payer: MEDICARE

## 2017-09-27 VITALS
DIASTOLIC BLOOD PRESSURE: 60 MMHG | TEMPERATURE: 96 F | WEIGHT: 164.44 LBS | HEIGHT: 63 IN | BODY MASS INDEX: 29.14 KG/M2 | RESPIRATION RATE: 16 BRPM | SYSTOLIC BLOOD PRESSURE: 130 MMHG | OXYGEN SATURATION: 97 % | HEART RATE: 60 BPM

## 2017-09-27 DIAGNOSIS — E78.2 COMBINED HYPERLIPIDEMIA ASSOCIATED WITH TYPE 2 DIABETES MELLITUS: ICD-10-CM

## 2017-09-27 DIAGNOSIS — E11.69 COMBINED HYPERLIPIDEMIA ASSOCIATED WITH TYPE 2 DIABETES MELLITUS: ICD-10-CM

## 2017-09-27 DIAGNOSIS — I15.2 HYPERTENSION ASSOCIATED WITH DIABETES: ICD-10-CM

## 2017-09-27 DIAGNOSIS — E11.51 TYPE 2 DIABETES MELLITUS WITH PERIPHERAL ARTERY DISEASE: ICD-10-CM

## 2017-09-27 DIAGNOSIS — E11.59 HYPERTENSION ASSOCIATED WITH DIABETES: ICD-10-CM

## 2017-09-27 DIAGNOSIS — E11.51 TYPE 2 DIABETES MELLITUS WITH PERIPHERAL ARTERY DISEASE: Primary | ICD-10-CM

## 2017-09-27 DIAGNOSIS — F17.200 TOBACCO USE DISORDER: ICD-10-CM

## 2017-09-27 LAB
ALBUMIN SERPL BCP-MCNC: 3.4 G/DL
ALP SERPL-CCNC: 116 U/L
ALT SERPL W/O P-5'-P-CCNC: 14 U/L
ANION GAP SERPL CALC-SCNC: 9 MMOL/L
AST SERPL-CCNC: 19 U/L
BILIRUB SERPL-MCNC: 0.4 MG/DL
BUN SERPL-MCNC: 17 MG/DL
CALCIUM SERPL-MCNC: 10.3 MG/DL
CHLORIDE SERPL-SCNC: 104 MMOL/L
CO2 SERPL-SCNC: 25 MMOL/L
CREAT SERPL-MCNC: 0.9 MG/DL
EST. GFR  (AFRICAN AMERICAN): >60 ML/MIN/1.73 M^2
EST. GFR  (NON AFRICAN AMERICAN): >60 ML/MIN/1.73 M^2
ESTIMATED AVG GLUCOSE: 117 MG/DL
GLUCOSE SERPL-MCNC: 95 MG/DL
HBA1C MFR BLD HPLC: 5.7 %
POTASSIUM SERPL-SCNC: 3.9 MMOL/L
PROT SERPL-MCNC: 7.6 G/DL
SODIUM SERPL-SCNC: 138 MMOL/L

## 2017-09-27 PROCEDURE — 36415 COLL VENOUS BLD VENIPUNCTURE: CPT | Mod: PO

## 2017-09-27 PROCEDURE — 80053 COMPREHEN METABOLIC PANEL: CPT

## 2017-09-27 PROCEDURE — 83036 HEMOGLOBIN GLYCOSYLATED A1C: CPT

## 2017-09-27 PROCEDURE — 99214 OFFICE O/P EST MOD 30 MIN: CPT | Mod: 25,S$GLB,, | Performed by: FAMILY MEDICINE

## 2017-09-27 PROCEDURE — 99999 PR PBB SHADOW E&M-EST. PATIENT-LVL IV: CPT | Mod: PBBFAC,,, | Performed by: FAMILY MEDICINE

## 2017-09-27 PROCEDURE — G0008 ADMIN INFLUENZA VIRUS VAC: HCPCS | Mod: S$GLB,,, | Performed by: FAMILY MEDICINE

## 2017-09-27 PROCEDURE — 90662 IIV NO PRSV INCREASED AG IM: CPT | Mod: S$GLB,,, | Performed by: FAMILY MEDICINE

## 2017-09-27 NOTE — PROGRESS NOTES
Subjective:       Patient ID: Danyelle Garcia is a 77 y.o. female.    Chief Complaint: Hypertension; Diabetes; and Follow-up    Ms. Garcia comes in today for diabetes and hypertension follow-up.  She is not fasting but has taken medication today.  She does not perform home glucose checks; she states she needs a glucometer with supplies.  She states she continues to smoke but is working on quitting.    She states she feels okay today.  She denies having fever, chills, fatigue, appetite change; shortness of breath, cough, wheezing; chest pain, palpitations, leg swelling; abdominal pain, nausea, vomiting, diarrhea, constipation; unusual urinary symptoms; polydipsia, polyuria, polyphagia; back pain; headaches; anxiety, depression, homicidal or suicidal thoughts.      Current Outpatient Prescriptions:  alcohol swabs PadM, Apply 1 each topically as needed.  calcium carbonate (OS-DAMIAN) 600 mg (1,500 mg) Tab, Take 600 mg by mouth 2 (two) times daily with meals.  glipiZIDE (GLUCOTROL) 5 MG tablet, TAKE 1/2 TABLET BY MOUTH WITH BREAKFAST  lancets 32 gauge Misc, 1 lancet by Misc.(Non-Drug; Combo Route) route 2 (two) times daily.  losartan (COZAAR) 50 MG tablet, Take 1 tablet (50 mg total) by mouth once daily.  nystatin (MYCOSTATIN) cream, Apply topically 2 (two) times daily as needed for Dry Skin.  pantoprazole (PROTONIX) 40 MG tablet, Take 1 tablet (40 mg total) by mouth once daily.  atorvastatin (LIPITOR) 80 MG tablet, Take 1 tablet (80 mg total) by mouth nightly (forgets to take night).  blood glucose control, normal (ACCU-CHEK SMARTVIEW CONTRL SOL) Soln, 1 each by Misc.(Non-Drug; Combo Route) route 2 (two) times daily.  blood sugar diagnostic Strp, 1 strip by Misc.(Non-Drug; Combo Route) route 2 (two) times daily.  blood-glucose meter kit, Use as instructed            Hypertension   Pertinent negatives include no chest pain, headaches, palpitations or shortness of breath.   Diabetes   Pertinent negatives for  hypoglycemia include no headaches. Pertinent negatives for diabetes include no chest pain, no fatigue, no polydipsia, no polyphagia and no polyuria.     Review of Systems   Constitutional: Negative for activity change, appetite change, chills, fatigue and fever.        Weight  73.3 kg (161 lb 9.6 oz) at May 26, 2017 visit.   Eyes:        See history of present illness.   Respiratory: Negative for cough, shortness of breath and wheezing.    Cardiovascular: Negative for chest pain, palpitations and leg swelling.   Gastrointestinal: Negative for abdominal pain, constipation, diarrhea, nausea and vomiting.        See history of present illness.   Endocrine: Negative for polydipsia, polyphagia and polyuria.        See history of present illness.   Genitourinary: Negative for difficulty urinating.        Chronic.   Musculoskeletal: Negative for arthralgias and back pain.   Neurological: Negative for headaches.   Hematological:        See history of present illness.   Psychiatric/Behavioral:        See history of present illness.       Objective:      Physical Exam   Constitutional: She is oriented to person, place, and time. She appears well-developed and well-nourished. No distress.   Elderly and pleasant.   Neck: Normal range of motion. Neck supple. No thyromegaly present.   Cardiovascular: Normal rate and regular rhythm.    Pulses:       Dorsalis pedis pulses are 2+ on the right side, and 1+ on the left side.        Posterior tibial pulses are 1+ on the right side, and 1+ on the left side.   Chronic decreased pedal pulses.   Pulmonary/Chest: Effort normal and breath sounds normal. No respiratory distress. She has no wheezes. Left breast exhibits no inverted nipple, no mass, no nipple discharge, no skin change and no tenderness. There is no breast swelling.   Abdominal: Soft. Bowel sounds are normal. She exhibits no distension and no mass. There is no tenderness. There is no rebound and no guarding.   Genitourinary: No  breast tenderness or discharge.   Musculoskeletal: Normal range of motion. She exhibits no edema or tenderness.   She is ambulatory without problems. Feet look okay without ulcerations or skin breaks.    Feet:   Right Foot:   Protective Sensation: 5 sites tested. 5 sites sensed.   Skin Integrity: Negative for ulcer or skin breakdown.   Left Foot:   Protective Sensation: 5 sites tested. 5 sites sensed.   Skin Integrity: Negative for ulcer or skin breakdown.   Lymphadenopathy:     She has no cervical adenopathy.     She has no axillary adenopathy.   Neurological: She is alert and oriented to person, place, and time.   Skin: No rash noted. She is not diaphoretic. No erythema.   Psychiatric: She has a normal mood and affect. Her behavior is normal. Judgment and thought content normal.   Vitals reviewed.      Assessment:       1. Type 2 diabetes mellitus with peripheral artery disease    2. Hypertension associated with diabetes    3. Combined hyperlipidemia associated with type 2 diabetes mellitus    4. Tobacco use disorder        Plan:       1.  Labs: CMP, A1c.  Patient advised to call for results.  2.  Continue current medications, prior, low-cholesterol, low carbohydrate diet, daily walks as tolerated.  3.  Smoking cessation advised.  4.  High dose flu shot today.  5.  Prescription for Glucometer with testing supplies.  6.  See me in March 2018 for fasting annual wellness examination.

## 2017-11-01 DIAGNOSIS — E78.5 HYPERLIPIDEMIA, UNSPECIFIED HYPERLIPIDEMIA TYPE: ICD-10-CM

## 2017-11-02 RX ORDER — ATORVASTATIN CALCIUM 80 MG/1
TABLET, FILM COATED ORAL
Qty: 90 TABLET | Refills: 1 | Status: SHIPPED | OUTPATIENT
Start: 2017-11-02 | End: 2018-10-22 | Stop reason: SDUPTHER

## 2017-11-02 RX ORDER — LOSARTAN POTASSIUM 50 MG/1
TABLET ORAL
Qty: 90 TABLET | Refills: 1 | Status: SHIPPED | OUTPATIENT
Start: 2017-11-02 | End: 2018-01-30 | Stop reason: SDUPTHER

## 2017-12-12 ENCOUNTER — CLINICAL SUPPORT (OUTPATIENT)
Dept: SMOKING CESSATION | Facility: CLINIC | Age: 77
End: 2017-12-12
Payer: COMMERCIAL

## 2017-12-12 DIAGNOSIS — F17.200 NICOTINE DEPENDENCE: Primary | ICD-10-CM

## 2017-12-12 PROCEDURE — 99407 BEHAV CHNG SMOKING > 10 MIN: CPT | Mod: S$GLB,,,

## 2018-01-06 NOTE — PATIENT INSTRUCTIONS
Please take over-the-counter Vitamin D 5000 units daily to help get your vitamin D level up.    Thanks.  
DISPLAY PLAN FREE TEXT

## 2018-01-10 ENCOUNTER — TELEPHONE (OUTPATIENT)
Dept: HEMATOLOGY/ONCOLOGY | Facility: CLINIC | Age: 78
End: 2018-01-10

## 2018-01-10 NOTE — TELEPHONE ENCOUNTER
I spoke with Mrs. Garcia concerning rescheduling her appointment due to book out.  She said that she had the flu over the holidays.   She said she has too many Doctor appointments this month.  She wants to wait until Dr Finley gets back.    Ok for patient to wait until March?

## 2018-01-30 NOTE — TELEPHONE ENCOUNTER
----- Message from Kera Garcia sent at 1/30/2018  2:48 PM CST -----  Pt needs to know if she can be fit in sooner with this doctor /please call [phone]/ma

## 2018-01-31 RX ORDER — LOSARTAN POTASSIUM 50 MG/1
TABLET ORAL
Qty: 90 TABLET | Refills: 0 | Status: SHIPPED | OUTPATIENT
Start: 2018-01-31 | End: 2018-03-02 | Stop reason: SDUPTHER

## 2018-01-31 RX ORDER — PANTOPRAZOLE SODIUM 40 MG/1
40 TABLET, DELAYED RELEASE ORAL DAILY
Qty: 90 TABLET | Refills: 0 | Status: SHIPPED | OUTPATIENT
Start: 2018-01-31 | End: 2018-03-02

## 2018-03-02 ENCOUNTER — OFFICE VISIT (OUTPATIENT)
Dept: FAMILY MEDICINE | Facility: CLINIC | Age: 78
End: 2018-03-02
Payer: MEDICARE

## 2018-03-02 VITALS
HEART RATE: 48 BPM | HEIGHT: 63 IN | WEIGHT: 159.81 LBS | OXYGEN SATURATION: 98 % | BODY MASS INDEX: 28.32 KG/M2 | SYSTOLIC BLOOD PRESSURE: 138 MMHG | DIASTOLIC BLOOD PRESSURE: 70 MMHG | TEMPERATURE: 98 F

## 2018-03-02 DIAGNOSIS — Z78.0 POSTMENOPAUSAL: ICD-10-CM

## 2018-03-02 DIAGNOSIS — E11.59 HYPERTENSION ASSOCIATED WITH DIABETES: ICD-10-CM

## 2018-03-02 DIAGNOSIS — I77.9 CAROTID ARTERY DISEASE, UNSPECIFIED LATERALITY: ICD-10-CM

## 2018-03-02 DIAGNOSIS — G89.29 CHRONIC NONINTRACTABLE HEADACHE, UNSPECIFIED HEADACHE TYPE: ICD-10-CM

## 2018-03-02 DIAGNOSIS — R41.3 MEMORY DEFICITS: ICD-10-CM

## 2018-03-02 DIAGNOSIS — F17.200 TOBACCO USE DISORDER: ICD-10-CM

## 2018-03-02 DIAGNOSIS — C50.912 MALIGNANT NEOPLASM OF LEFT BREAST IN FEMALE, ESTROGEN RECEPTOR POSITIVE, UNSPECIFIED SITE OF BREAST: ICD-10-CM

## 2018-03-02 DIAGNOSIS — I73.9 PAD (PERIPHERAL ARTERY DISEASE): ICD-10-CM

## 2018-03-02 DIAGNOSIS — M89.9 DISORDER OF BONE AND CARTILAGE: ICD-10-CM

## 2018-03-02 DIAGNOSIS — E78.2 COMBINED HYPERLIPIDEMIA ASSOCIATED WITH TYPE 2 DIABETES MELLITUS: ICD-10-CM

## 2018-03-02 DIAGNOSIS — M94.9 DISORDER OF BONE AND CARTILAGE: ICD-10-CM

## 2018-03-02 DIAGNOSIS — Z00.00 ANNUAL PHYSICAL EXAM: Primary | ICD-10-CM

## 2018-03-02 DIAGNOSIS — K63.5 BENIGN COLON POLYP: ICD-10-CM

## 2018-03-02 DIAGNOSIS — I25.10 CORONARY ARTERY DISEASE INVOLVING NATIVE CORONARY ARTERY OF NATIVE HEART WITHOUT ANGINA PECTORIS: Chronic | ICD-10-CM

## 2018-03-02 DIAGNOSIS — K21.9 GASTROESOPHAGEAL REFLUX DISEASE, ESOPHAGITIS PRESENCE NOT SPECIFIED: ICD-10-CM

## 2018-03-02 DIAGNOSIS — E55.9 VITAMIN D DEFICIENCY: ICD-10-CM

## 2018-03-02 DIAGNOSIS — R51.9 CHRONIC NONINTRACTABLE HEADACHE, UNSPECIFIED HEADACHE TYPE: ICD-10-CM

## 2018-03-02 DIAGNOSIS — E11.69 COMBINED HYPERLIPIDEMIA ASSOCIATED WITH TYPE 2 DIABETES MELLITUS: ICD-10-CM

## 2018-03-02 DIAGNOSIS — E11.51 TYPE 2 DIABETES MELLITUS WITH PERIPHERAL ARTERY DISEASE: ICD-10-CM

## 2018-03-02 DIAGNOSIS — I15.2 HYPERTENSION ASSOCIATED WITH DIABETES: ICD-10-CM

## 2018-03-02 DIAGNOSIS — Z17.0 MALIGNANT NEOPLASM OF LEFT BREAST IN FEMALE, ESTROGEN RECEPTOR POSITIVE, UNSPECIFIED SITE OF BREAST: ICD-10-CM

## 2018-03-02 DIAGNOSIS — E11.49 CONTROLLED TYPE 2 DIABETES MELLITUS WITH OTHER NEUROLOGIC COMPLICATION, UNSPECIFIED LONG TERM INSULIN USE STATUS: ICD-10-CM

## 2018-03-02 PROCEDURE — 99397 PER PM REEVAL EST PAT 65+ YR: CPT | Mod: 25,S$GLB,, | Performed by: FAMILY MEDICINE

## 2018-03-02 PROCEDURE — 99406 BEHAV CHNG SMOKING 3-10 MIN: CPT | Mod: S$GLB,,, | Performed by: FAMILY MEDICINE

## 2018-03-02 PROCEDURE — 99999 PR PBB SHADOW E&M-EST. PATIENT-LVL IV: CPT | Mod: PBBFAC,,, | Performed by: FAMILY MEDICINE

## 2018-03-02 RX ORDER — LOSARTAN POTASSIUM 50 MG/1
TABLET ORAL
Qty: 90 TABLET | Refills: 1 | Status: SHIPPED | OUTPATIENT
Start: 2018-03-02 | End: 2018-05-08 | Stop reason: SDUPTHER

## 2018-03-02 RX ORDER — HYDROCODONE BITARTRATE AND ACETAMINOPHEN 7.5; 325 MG/1; MG/1
TABLET ORAL
COMMUNITY
Start: 2018-02-28 | End: 2018-07-12

## 2018-03-02 NOTE — PATIENT INSTRUCTIONS
Please call Dr. Chan for your results.    Take over-the-counter Os-Ricco 1 pill twice daily.    Thanks.

## 2018-03-02 NOTE — PROGRESS NOTES
HISTORY OF PRESENT ILLNESS: Ms. Garcia comes in today non fasting and without taking medications for annual wellness exam.      END OF LIFE DECISION: She has a living will and does not desire life support.    She continues to follow with hematologist/oncologist Dr. Finley for breast cancer surveillance with breast exam and mammogram with last visit on July 18, 2017 with 6-month follow up with CBC, CMP advised and scheduled for March 16, 2018.     She pulliam not perform glucose checks as she states she does not have a glucometer.     She also follows with Dr. Loomis, vascular surgeon for carotid artery disease surveillance, occasionally and reports has not seen him in some time.      She saw Dr. Fredi Garces, cardiologist, on February 2, 2018 for chest pain with left arm pain evaluation (with sinus node dysfunction) with carotid artery ultrasound and stress test ordered/performed with follow up scheduled for March 22, 2018.    She states she follows with Dr. Ortiz, pain management specialist, for right leg pain and states approximately 1 month ago received procedure without help and another procedure scheduled for March 6, 2018. She states he prescribed Hydrocodone for pain control for her.    She continues to smoke but sporadically and is not interested in [facility] smoking cessation program again as she states it was not helpful. She states she will be soon trying the smoking cessation program at [facility].     Current Outpatient Prescriptions   Medication Sig    alcohol swabs PadM Apply 1 each topically as needed.    atorvastatin (LIPITOR) 80 MG tablet TAKE 1 TABLET BY MOUTH AT BEDTIME    blood sugar diagnostic Strp 1 strip by Misc.(Non-Drug; Combo Route) route 2 (two) times daily.    calcium carbonate (OS-DAMIAN) 600 mg (1,500 mg) Tab Take 600 mg by mouth 2 (two) times daily with meals.    glipiZIDE (GLUCOTROL) 5 MG tablet TAKE 1/2 TABLET BY MOUTH WITH BREAKFAST    hydrocodone-acetaminophen 7.5-325mg (NORCO)  7.5-325 mg per tablet     lancets 32 gauge Misc 1 lancet by Misc.(Non-Drug; Combo Route) route 2 (two) times daily.    losartan (COZAAR) 50 MG tablet TAKE 1 TABLET(50 MG) BY MOUTH EVERY DAY    blood glucose control, normal (ACCU-CHEK SMARTVIEW CONTRL SOL) Soln 1 each by Misc.(Non-Drug; Combo Route) route 2 (two) times daily.    blood-glucose meter kit Use as instructed     SCREENINGS:   Cholesterol: March 29, 2017.  FFS/Colonoscopy: August 4, 2015 - benign colon polyp, diverticulosis; repeat in 3 years.  Mammogram (with Dr. Finley): April 10, 2017 - benign.   GYN Exam (Breast exam) with Dr. Finley: July 18, 2017.   Dexa Scan: August 16, 2013 - osteopenia with moderate fracture risk; repeat in 5 years.   Eye Exam: July 19, 2017 with Dr. Cruz.   Dental Exam: Years ago per patient. She wears top dentures only.  PPD: Negative in the past.  Immunizations: Td/Tdap - less than 10 years ago per patient.  Gardasil - N./A.  Zostavax - June 14, 2012.  Pneumovax - March 26, 2014.  Prevnar-13 shot - March 29, 2016.  Seasonal Flu - September 27, 2017.    ROS:  GENERAL: Denies fever, chills, fatigue or unusual weight change. Weight 74.6 kg (164 lb 7.4 oz) at September 27, 2017 visit. Reports walks very little without help for right leg pain and monitors diet.   SKIN: Denies rashes, itching, changes in mole, color or texture of skin or easy bruising.  HEAD: Reports occasional, mild headaches but denies history of recent head trauma. Requests to see Neurology.  EYES: Denies change in vision, pain, diplopia, redness or discharge. Wears glasses.  EARS: Denies ear pain, discharge, vertigo or decreased hearing.   NOSE: Denies loss of smell, epistaxis or rhinitis.  MOUTH & THROAT: Denies hoarseness or change in voice. Denies excessive gum bleeding or mouth sores. Denies sore throat.  NODES: Denies swollen glands.  CHEST: Denies BRITO, wheezing, cough, or sputum production.  CARDIOVASCULAR: Denies PND, chest pain, orthopnea or  "reduced exercise tolerance. Denies palpitations. See history of present illness.  ABDOMEN: Denies constipation, diarrhea, nausea, vomiting, abdominal pain, or blood in stool.  URINARY: Denies flank pain, dysuria or hematuria.  GENITOURINARY: Denies flank pain, dysuria, frequency or hematuria. Performs monthly breast self exams. See history of present illness.  ENDOCRINE: Denies thyroid problems. See history of present illness.  HEME/LYMPH: Denies bleeding problems.  PERIPHERAL VASCULAR:Denies claudication or cyanosis.  MUSCULOSKELETAL: Denies pain, stiffness, edema except as noted above. See history of present illness. Requests mobility renewal form.  NEUROLOGIC: Denies history of seizures, tremors, paralysis, alteration of gait or coordination. She reports having memory issue.  PSYCHIATRIC: Denies mood swings, depression, anxiety, homicidal or suicidal thoughts. Denies sleep problems.    PE:   VS: /70 Comment: Rechecked by Dr. Chan.  Pulse (!) 48 Comment: Rechecked by Dr. Chan.  Temp 97.5 °F (36.4 °C) (Tympanic)   Ht 5' 3" (1.6 m)   Wt 72.5 kg (159 lb 13.3 oz)   SpO2 98%   BMI 28.31 kg/m²   APPEARANCE: Well nourished, well developed female, overweight, elderly and pleasant, alert and oriented in no acute distress.   HEAD: Nontender. Full range of motion.  EYES: PERRL, conjunctiva pink, lids no edema. She wears glasses.   EARS: External canal patent, no swelling or redness. TM's shiny and clear.  NOSE: Mucosa and turbinates pink, not swollen. No discharge. Nontender sinuses.  THROAT: No pharyngeal erythema or exudate. No stridor. She has top dentures.   NECK: Supple, no mass, thyroid not enlarged.  NODES: No cervical lymph node enlargement.  CHEST: Normal respiratory effort. Lungs clear to auscultation.  CARDIOVASCULAR: Bradycardia today with normal S1, S2. No rubs, murmurs or gallops. PMI not displaced. Chronic left carotid bruit. Feet look okay without ulcerations or skin breaks. Chronic slightly " decreased pedal pulses palpable bilaterally. No edema.  ABDOMEN: Bowel sounds present. Not distended. Soft. No tenderness, masses or organomegaly.  BREAST EXAM: Not examined as already performed by hematologist/oncologist.   PELVIC EXAM: Bimanual examination not examined as patient has had JACQUELINE/BSO due to noncancerous reasons. However, normal external female genitalia without skin irritation noted.  RECTAL EXAM: No external hemorrhoids or anal fissures. Heme-negative stool in the rectal vault.  MUSCULOSKELETAL: No joint deformities or stiffness. She is ambulatory without problems.  SKIN: No rashes or suspicious lesions, normal color and turgor.  NEUROLOGIC: Cranial Nerves: II-XII grossly intact. DTR's: Knees, Ankles 2+ and equal bilaterally. Monofilament test unremarkable. Gait & Posture: Normal gait and fine motion.  PSYCHIATRIC: Patient alert, oriented x 3. Mood/Affect normal without acute anxiety depression noted. Judgment/insight good as she makes appropriate decisions during today's examination but slight decreased memory deficit noted.    Protective Sensation (w/ 10 gram monofilament):  Right: Intact  Left: Intact    Visual Inspection:  Normal -  Bilateral    Pedal Pulses:   Right: Diminshed  Left: Diminshed    Posterior tibialis:   Right:Diminshed  Left: Diminshed     Lab Results   Component Value Date    HGBA1C 5.7 (H) 09/27/2017     ASSESSMENT:    ICD-10-CM ICD-9-CM    1. Annual physical exam Z00.00 V70.0    2. Hypertension associated with diabetes E11.59 250.80 Comprehensive metabolic panel    I10 401.9 Lipid panel      CBC auto differential      TSH   3. Combined hyperlipidemia associated with type 2 diabetes mellitus E11.69 250.80 Comprehensive metabolic panel    E78.2 272.2 Lipid panel   4. Carotid artery disease, unspecified laterality I77.9 447.9    5. Coronary artery disease involving native coronary artery of native heart without angina pectoris I25.10 414.01    6. PAD (peripheral artery disease)  I73.9 443.9    7. Controlled type 2 diabetes mellitus with other neurologic complication, unspecified long term insulin use status E11.49 250.60 Protein / creatinine ratio, urine      Hemoglobin A1c   8. Type 2 diabetes mellitus with peripheral artery disease E11.51 250.70      443.81    9. Vitamin D deficiency E55.9 268.9 Vitamin D   10. Malignant neoplasm of left breast in female, estrogen receptor positive, unspecified site of breast C50.912 174.9     Z17.0 V86.0    11. Disorder of bone and cartilage M89.9 733.90 DXA Bone Density Spine And Hip    M94.9     12. Gastroesophageal reflux disease, esophagitis presence not specified K21.9 530.81    13. Chronic nonintractable headache, unspecified headache type R51 784.0 Ambulatory referral to Neurology   14. Memory deficits R41.3 780.93 Ambulatory referral to Neurology   15. Benign colon polyp K63.5 211.3 Case request GI: COLONOSCOPY   16. Tobacco use disorder F17.200 305.1    17. Postmenopausal Z78.0 V49.81 DXA Bone Density Spine And Hip     PLAN:  1. Age-appropriate counseling-appropriate low-sodium, low-cholesterol, low carbohydrate diet and exercise daily, monthly breast self exam, annual wellness examination.   2. Patient advised to call for results.  3. Continue current medications - including take over-the-counter Os-Ricco 1 pill twice daily.  4. Smoking cessation advised. 3- 5 minutes spent in counseling and discussion regarding smoking cessation, risks, etc; patient does not desires medication for smoking cessation.  5. Keep followup with specialists - cardiologist, vascular surgery, hematology/oncology (for breast cancer surveillance), pain management specialist.  6. See me in 3 months for diabetes and hypertension followup.  7. Prescription refill - Cozaar 50 mg daily, #90, 1 refill.  8. Mobility impairment form renewed per patient request.  9. Follow-up in about 3 months (around 6/1/2018) for diabetes and hypertension follow up.

## 2018-03-16 ENCOUNTER — LAB VISIT (OUTPATIENT)
Dept: LAB | Facility: HOSPITAL | Age: 78
End: 2018-03-16
Attending: FAMILY MEDICINE
Payer: MEDICARE

## 2018-03-16 ENCOUNTER — TELEPHONE (OUTPATIENT)
Dept: HEMATOLOGY/ONCOLOGY | Facility: CLINIC | Age: 78
End: 2018-03-16

## 2018-03-16 ENCOUNTER — OFFICE VISIT (OUTPATIENT)
Dept: HEMATOLOGY/ONCOLOGY | Facility: CLINIC | Age: 78
End: 2018-03-16
Payer: MEDICARE

## 2018-03-16 VITALS
SYSTOLIC BLOOD PRESSURE: 146 MMHG | WEIGHT: 159.19 LBS | RESPIRATION RATE: 20 BRPM | DIASTOLIC BLOOD PRESSURE: 62 MMHG | TEMPERATURE: 99 F | HEIGHT: 63 IN | OXYGEN SATURATION: 99 % | BODY MASS INDEX: 28.21 KG/M2 | HEART RATE: 50 BPM

## 2018-03-16 DIAGNOSIS — E11.51 TYPE 2 DIABETES MELLITUS WITH PERIPHERAL ARTERY DISEASE: Primary | ICD-10-CM

## 2018-03-16 DIAGNOSIS — Z17.0 MALIGNANT NEOPLASM OF LEFT BREAST IN FEMALE, ESTROGEN RECEPTOR POSITIVE, UNSPECIFIED SITE OF BREAST: ICD-10-CM

## 2018-03-16 DIAGNOSIS — Z85.3 HISTORY OF LEFT BREAST CANCER: ICD-10-CM

## 2018-03-16 DIAGNOSIS — E78.2 COMBINED HYPERLIPIDEMIA ASSOCIATED WITH TYPE 2 DIABETES MELLITUS: ICD-10-CM

## 2018-03-16 DIAGNOSIS — I15.2 HYPERTENSION ASSOCIATED WITH DIABETES: ICD-10-CM

## 2018-03-16 DIAGNOSIS — E55.9 VITAMIN D DEFICIENCY: ICD-10-CM

## 2018-03-16 DIAGNOSIS — E11.69 COMBINED HYPERLIPIDEMIA ASSOCIATED WITH TYPE 2 DIABETES MELLITUS: ICD-10-CM

## 2018-03-16 DIAGNOSIS — C50.912 MALIGNANT NEOPLASM OF LEFT BREAST IN FEMALE, ESTROGEN RECEPTOR POSITIVE, UNSPECIFIED SITE OF BREAST: ICD-10-CM

## 2018-03-16 DIAGNOSIS — E11.49 CONTROLLED TYPE 2 DIABETES MELLITUS WITH OTHER NEUROLOGIC COMPLICATION, UNSPECIFIED LONG TERM INSULIN USE STATUS: ICD-10-CM

## 2018-03-16 DIAGNOSIS — E11.59 HYPERTENSION ASSOCIATED WITH DIABETES: ICD-10-CM

## 2018-03-16 LAB
25(OH)D3+25(OH)D2 SERPL-MCNC: 26 NG/ML
ALBUMIN SERPL BCP-MCNC: 3.4 G/DL
ALBUMIN SERPL BCP-MCNC: 3.4 G/DL
ALP SERPL-CCNC: 124 U/L
ALP SERPL-CCNC: 124 U/L
ALT SERPL W/O P-5'-P-CCNC: 16 U/L
ALT SERPL W/O P-5'-P-CCNC: 16 U/L
ANION GAP SERPL CALC-SCNC: 12 MMOL/L
ANION GAP SERPL CALC-SCNC: 12 MMOL/L
AST SERPL-CCNC: 15 U/L
AST SERPL-CCNC: 15 U/L
BASOPHILS # BLD AUTO: 0.02 K/UL
BASOPHILS # BLD AUTO: 0.02 K/UL
BASOPHILS NFR BLD: 0.2 %
BASOPHILS NFR BLD: 0.2 %
BILIRUB SERPL-MCNC: 0.4 MG/DL
BILIRUB SERPL-MCNC: 0.4 MG/DL
BUN SERPL-MCNC: 20 MG/DL
BUN SERPL-MCNC: 20 MG/DL
CALCIUM SERPL-MCNC: 10.7 MG/DL
CALCIUM SERPL-MCNC: 10.7 MG/DL
CHLORIDE SERPL-SCNC: 101 MMOL/L
CHLORIDE SERPL-SCNC: 101 MMOL/L
CHOLEST SERPL-MCNC: 218 MG/DL
CHOLEST/HDLC SERPL: 3.3 {RATIO}
CO2 SERPL-SCNC: 24 MMOL/L
CO2 SERPL-SCNC: 24 MMOL/L
CREAT SERPL-MCNC: 0.8 MG/DL
CREAT SERPL-MCNC: 0.8 MG/DL
DIFFERENTIAL METHOD: ABNORMAL
DIFFERENTIAL METHOD: ABNORMAL
EOSINOPHIL # BLD AUTO: 0.1 K/UL
EOSINOPHIL # BLD AUTO: 0.1 K/UL
EOSINOPHIL NFR BLD: 0.6 %
EOSINOPHIL NFR BLD: 0.6 %
ERYTHROCYTE [DISTWIDTH] IN BLOOD BY AUTOMATED COUNT: 13.7 %
ERYTHROCYTE [DISTWIDTH] IN BLOOD BY AUTOMATED COUNT: 13.7 %
EST. GFR  (AFRICAN AMERICAN): >60 ML/MIN/1.73 M^2
EST. GFR  (AFRICAN AMERICAN): >60 ML/MIN/1.73 M^2
EST. GFR  (NON AFRICAN AMERICAN): >60 ML/MIN/1.73 M^2
EST. GFR  (NON AFRICAN AMERICAN): >60 ML/MIN/1.73 M^2
ESTIMATED AVG GLUCOSE: 120 MG/DL
GLUCOSE SERPL-MCNC: 56 MG/DL
GLUCOSE SERPL-MCNC: 56 MG/DL
HBA1C MFR BLD HPLC: 5.8 %
HCT VFR BLD AUTO: 42 %
HCT VFR BLD AUTO: 42 %
HDLC SERPL-MCNC: 67 MG/DL
HDLC SERPL: 30.7 %
HGB BLD-MCNC: 14.2 G/DL
HGB BLD-MCNC: 14.2 G/DL
LDLC SERPL CALC-MCNC: 129.6 MG/DL
LYMPHOCYTES # BLD AUTO: 2.6 K/UL
LYMPHOCYTES # BLD AUTO: 2.6 K/UL
LYMPHOCYTES NFR BLD: 30.2 %
LYMPHOCYTES NFR BLD: 30.2 %
MCH RBC QN AUTO: 32.3 PG
MCH RBC QN AUTO: 32.3 PG
MCHC RBC AUTO-ENTMCNC: 33.8 G/DL
MCHC RBC AUTO-ENTMCNC: 33.8 G/DL
MCV RBC AUTO: 96 FL
MCV RBC AUTO: 96 FL
MONOCYTES # BLD AUTO: 0.7 K/UL
MONOCYTES # BLD AUTO: 0.7 K/UL
MONOCYTES NFR BLD: 7.8 %
MONOCYTES NFR BLD: 7.8 %
NEUTROPHILS # BLD AUTO: 5.2 K/UL
NEUTROPHILS # BLD AUTO: 5.2 K/UL
NEUTROPHILS NFR BLD: 61.2 %
NEUTROPHILS NFR BLD: 61.2 %
NONHDLC SERPL-MCNC: 151 MG/DL
PLATELET # BLD AUTO: 199 K/UL
PLATELET # BLD AUTO: 199 K/UL
PMV BLD AUTO: 10.6 FL
PMV BLD AUTO: 10.6 FL
POTASSIUM SERPL-SCNC: 4.2 MMOL/L
POTASSIUM SERPL-SCNC: 4.2 MMOL/L
PROT SERPL-MCNC: 8 G/DL
PROT SERPL-MCNC: 8 G/DL
RBC # BLD AUTO: 4.39 M/UL
RBC # BLD AUTO: 4.39 M/UL
SODIUM SERPL-SCNC: 137 MMOL/L
SODIUM SERPL-SCNC: 137 MMOL/L
TRIGL SERPL-MCNC: 107 MG/DL
TSH SERPL DL<=0.005 MIU/L-ACNC: 1.13 UIU/ML
WBC # BLD AUTO: 8.55 K/UL
WBC # BLD AUTO: 8.55 K/UL

## 2018-03-16 PROCEDURE — 36415 COLL VENOUS BLD VENIPUNCTURE: CPT | Mod: PO

## 2018-03-16 PROCEDURE — 99214 OFFICE O/P EST MOD 30 MIN: CPT | Mod: S$GLB,,, | Performed by: INTERNAL MEDICINE

## 2018-03-16 PROCEDURE — 3078F DIAST BP <80 MM HG: CPT | Mod: CPTII,S$GLB,, | Performed by: INTERNAL MEDICINE

## 2018-03-16 PROCEDURE — 84443 ASSAY THYROID STIM HORMONE: CPT

## 2018-03-16 PROCEDURE — 3077F SYST BP >= 140 MM HG: CPT | Mod: CPTII,S$GLB,, | Performed by: INTERNAL MEDICINE

## 2018-03-16 PROCEDURE — 85025 COMPLETE CBC W/AUTO DIFF WBC: CPT | Mod: PO

## 2018-03-16 PROCEDURE — 80053 COMPREHEN METABOLIC PANEL: CPT | Mod: PO

## 2018-03-16 PROCEDURE — 83036 HEMOGLOBIN GLYCOSYLATED A1C: CPT

## 2018-03-16 PROCEDURE — 99999 PR PBB SHADOW E&M-EST. PATIENT-LVL III: CPT | Mod: PBBFAC,,, | Performed by: INTERNAL MEDICINE

## 2018-03-16 PROCEDURE — 82306 VITAMIN D 25 HYDROXY: CPT

## 2018-03-16 PROCEDURE — 80061 LIPID PANEL: CPT | Mod: PO

## 2018-03-16 RX ORDER — PANTOPRAZOLE SODIUM 40 MG/1
TABLET, DELAYED RELEASE ORAL
Refills: 0 | COMMUNITY
Start: 2018-03-07 | End: 2018-05-08 | Stop reason: SDUPTHER

## 2018-03-16 RX ORDER — GLUCOSAM/CHON-MSM1/C/MANG/BOSW 500-416.6
TABLET ORAL
Qty: 100 EACH | Refills: 3 | Status: SHIPPED | OUTPATIENT
Start: 2018-03-16 | End: 2018-05-08 | Stop reason: SDUPTHER

## 2018-03-16 NOTE — TELEPHONE ENCOUNTER
----- Message from Dilma Chan MD sent at 3/16/2018 11:48 AM CDT -----  See which specific items are needed and request so I can sign. Thanks.  ----- Message -----  From: Octaviano Finley MD  Sent: 3/16/2018  10:15 AM  To: MD Dilma Gonzalez, she asked me to let you know she ran out of material to check her blood sugars at home  Octaviano

## 2018-03-16 NOTE — TELEPHONE ENCOUNTER
----- Message from Octaviano Finley MD sent at 3/16/2018  3:33 PM CDT -----  Please call her and  Let her know her calcium leel was slightly up, so she does not need to take that much oral calcium..  She is supposed to be taking Os-Ricco 600 mg twice a day, so I want her to go down to once a day  DR FINLEY

## 2018-03-16 NOTE — PROGRESS NOTES
Subjective:       Patient ID: Danyelle Garcia is a 77 y.o. female.    Chief Complaint: No chief complaint on file.    MIKE Cutler is a 77 year old  lady who comes for follow up of her previously diagnosed triple negative breast cancer.  on  was diagnosed as having cancer of the left breast and underwent  lumpectomy along with sentinel lymph node biopsy. The pathology report from  the [facility] ([facility] indicated that the   patient had an invasive ductal carcinoma of the breast measuring 1.5 cm in   diameter.     The ER receptors and the HER-2 kris tests were negative.   She received 4 cycles of adjuvant AC which she tolerated well.  She had radiation therapy.  She has stable, chronic Bell's palsy  She also has HBP , diabetes and elevated cholesterol for which she takes medications    She says she has been unable to check her Bs at home sicne she ran out of the material  to do it  S t ALLERGIES: No known drug allergies.     CURRENT MEDICATIONS: see med card.     PREVIOUS SURGERIES: Cholecystectomy, hysterectomy, lumpectomy and sentinel   lymph node biopsy on 3/17/05.     SOCIAL HISTORY: She is single. She has three children. She lives in [place]. She used to smoke for 20 years averaging 4 to 5 cigarettes a day and  stopped a month ago. She drinks about a six pack of beer a month. She used   to work in [employer].     FAMILY DISEASES: No diabetes or cancer in the family. Father  of a   heart attack.     PAST MEDICAL HISTORY:  1-Breast cancer s/p surgery. radiaion therapy and adjuvant chemo.  2-chronic stable Bell's palsy  3-diabetes mellitus  4-elevated cholesterol  Review of Systems   Constitutional: Negative.    HENT: Negative.    Eyes: Negative.    Respiratory: Negative.  Negative for cough and wheezing.    Cardiovascular: Negative.  Negative for chest pain.   Gastrointestinal: Negative.    Genitourinary: Negative.    Neurological: Negative.     Psychiatric/Behavioral: Negative.        Objective:      Physical Exam   Constitutional: She is oriented to person, place, and time. She appears well-developed. No distress.   HENT:   Head: Normocephalic.   Right Ear: Tympanic membrane, external ear and ear canal normal.   Left Ear: Tympanic membrane, external ear and ear canal normal.   Nose: Nose normal. Right sinus exhibits no maxillary sinus tenderness and no frontal sinus tenderness. Left sinus exhibits no maxillary sinus tenderness and no frontal sinus tenderness.   Mouth/Throat: Oropharynx is clear and moist and mucous membranes are normal.   Teeth normal.  Gums normal.   Eyes: Conjunctivae and lids are normal. Pupils are equal, round, and reactive to light.   Neck: Normal carotid pulses, no hepatojugular reflux and no JVD present. Carotid bruit is not present. No tracheal deviation present. No thyroid mass and no thyromegaly present.   Cardiovascular: Normal rate, regular rhythm, S1 normal, S2 normal, normal heart sounds and intact distal pulses.  Exam reveals no gallop and no friction rub.    No murmur heard.  Carotid exam normal   Pulmonary/Chest: Effort normal and breath sounds normal. No accessory muscle usage. No respiratory distress. She has no wheezes. She has no rales. She exhibits no tenderness.   Abdominal: Soft. Normal appearance. She exhibits no distension and no mass. There is no splenomegaly or hepatomegaly. There is no tenderness. There is no rebound and no guarding.   Musculoskeletal: Normal range of motion. She exhibits no edema or tenderness.        Right hand: Normal.        Left hand: Normal.       Lymphadenopathy:     She has no cervical adenopathy.     She has no axillary adenopathy.        Right: No inguinal and no supraclavicular adenopathy present.        Left: No inguinal and no supraclavicular adenopathy present.   Neurological: She is alert and oriented to person, place, and time. She has normal strength. No cranial nerve  deficit. Coordination normal.   Skin: Skin is warm and dry. No rash noted. She is not diaphoretic. No cyanosis or erythema. No pallor. Nails show no clubbing.   Psychiatric: She has a normal mood and affect. Her behavior is normal. Judgment and thought content normal.       Wt Readings from Last 3 Encounters:   03/16/18 72.2 kg (159 lb 2.8 oz)   03/02/18 72.5 kg (159 lb 13.3 oz)   09/27/17 74.6 kg (164 lb 7.4 oz)     Temp Readings from Last 3 Encounters:   03/16/18 99.2 °F (37.3 °C) (Oral)   03/02/18 97.5 °F (36.4 °C) (Tympanic)   09/27/17 96 °F (35.6 °C) (Tympanic)     BP Readings from Last 3 Encounters:   03/16/18 (!) 146/62   03/02/18 138/70   09/27/17 130/60     Pulse Readings from Last 3 Encounters:   03/16/18 (!) 50   03/02/18 (!) 48   09/27/17 60       Assessment:       1. Type 2 diabetes mellitus with peripheral artery disease    2. History of left breast cancer        Plan:       Lab Results   Component Value Date    WBC 8.55 03/16/2018    WBC 8.55 03/16/2018    HGB 14.2 03/16/2018    HGB 14.2 03/16/2018    HCT 42.0 03/16/2018    HCT 42.0 03/16/2018    MCV 96 03/16/2018    MCV 96 03/16/2018     03/16/2018     03/16/2018   ''ccmp pending  Due for amamogram at the end of September. Follow up in 6 months with a cbc, cmp and A1-C Hgb.  Discuss with PCP getting supplies for home checks of blood sugar  .  Lab Results   Component Value Date    CALCIUM 10.7 (H) 03/16/2018    CALCIUM 10.7 (H) 03/16/2018       Calcium slightly up. She is supposed to be taking Os-Ricco 600 mg po bid. Will decrease calcium to 600 mg qd

## 2018-03-19 ENCOUNTER — TELEPHONE (OUTPATIENT)
Dept: HEMATOLOGY/ONCOLOGY | Facility: CLINIC | Age: 78
End: 2018-03-19

## 2018-03-19 DIAGNOSIS — E83.52 SERUM CALCIUM ELEVATED: Primary | ICD-10-CM

## 2018-03-19 NOTE — TELEPHONE ENCOUNTER
----- Message from Octaviano Finley MD sent at 3/19/2018  2:04 PM CDT -----  Please make sure she is not taking Os-Ricco. She might not know this has calcium.  In she is not, then have her a serum PTH and see me in  Week  DR FINLEY

## 2018-03-19 NOTE — TELEPHONE ENCOUNTER
----- Message from Janett Stevens sent at 3/19/2018 11:01 AM CDT -----  Contact: pt  The pt states she is returning a missed call, the pt can be reached at [phone]///[email or web address]

## 2018-03-19 NOTE — TELEPHONE ENCOUNTER
I spoke with patient.  She said OS Ricco was too expensive and she was not taking it.  She will have to get medical transportation for her blood work so she will call to schedule an appointment

## 2018-03-21 ENCOUNTER — TELEPHONE (OUTPATIENT)
Dept: HEMATOLOGY/ONCOLOGY | Facility: CLINIC | Age: 78
End: 2018-03-21

## 2018-03-21 NOTE — TELEPHONE ENCOUNTER
Patient has trouble with transportation.  She said that they had to stick her twice for blood work when she came in here.  Then they had to stick her twice at her cardiologist's office.  She is to have a cardiac procedure  At [facility] on Monday.  She wants to know if she really needs to come in this week for PTH.  She is concerned that it will interfere with her procedure.

## 2018-03-21 NOTE — TELEPHONE ENCOUNTER
----- Message from Octaviano Finley MD sent at 3/21/2018  3:16 PM CDT -----  47 minutes ago (2:28 PM)     Tell her it is Ok, and we will draw it prior to her next visit  DR Finley    Patient has trouble with transportation.  She said that they had to stick her twice for blood work when she came in here.  Then they had to stick her twice at her cardiologist's office.  She is to have a cardiac procedure  At [facility] on Monday.  She wants to know if she really needs to come in this week for PTH.  She is concerned that it will interfere with her procedure.        Documentation     Stacie Ramos MA 47 minutes ago (2:28 PM)          ----- Message from Angy Garg sent at 3/21/2018  9:55 AM CDT -----  Contact: pt  Calling in regards with questions and please advise. [phone] (home)

## 2018-03-21 NOTE — TELEPHONE ENCOUNTER
----- Message from Angy Garg sent at 3/21/2018  9:55 AM CDT -----  Contact: pt  Calling in regards with questions and please advise. [phone] (jgoy)

## 2018-04-12 ENCOUNTER — TELEPHONE (OUTPATIENT)
Dept: NEUROLOGY | Facility: CLINIC | Age: 78
End: 2018-04-12

## 2018-05-08 RX ORDER — BLOOD-GLUCOSE CONTROL, NORMAL
1 EACH MISCELLANEOUS 2 TIMES DAILY
Qty: 200 EACH | Refills: 3 | Status: SHIPPED | OUTPATIENT
Start: 2018-05-08 | End: 2021-04-13 | Stop reason: SDUPTHER

## 2018-05-08 RX ORDER — INSULIN PUMP SYRINGE, 3 ML
EACH MISCELLANEOUS
Qty: 1 EACH | Refills: 0 | Status: SHIPPED | OUTPATIENT
Start: 2018-05-08 | End: 2021-04-13 | Stop reason: SDUPTHER

## 2018-05-08 RX ORDER — PANTOPRAZOLE SODIUM 40 MG/1
TABLET, DELAYED RELEASE ORAL
Qty: 90 TABLET | Refills: 1 | Status: SHIPPED | OUTPATIENT
Start: 2018-05-08 | End: 2018-10-22 | Stop reason: SDUPTHER

## 2018-05-08 RX ORDER — INSULIN PUMP SYRINGE, 3 ML
1 EACH MISCELLANEOUS 2 TIMES DAILY
Qty: 1 EACH | Refills: 0 | Status: SHIPPED | OUTPATIENT
Start: 2018-05-08 | End: 2021-04-13 | Stop reason: SDUPTHER

## 2018-05-08 RX ORDER — LOSARTAN POTASSIUM 50 MG/1
TABLET ORAL
Qty: 90 TABLET | Refills: 1 | Status: SHIPPED | OUTPATIENT
Start: 2018-05-08 | End: 2018-10-22 | Stop reason: SDUPTHER

## 2018-05-21 ENCOUNTER — TELEPHONE (OUTPATIENT)
Dept: FAMILY MEDICINE | Facility: CLINIC | Age: 78
End: 2018-05-21

## 2018-05-21 NOTE — TELEPHONE ENCOUNTER
----- Message from Rachel Mota sent at 5/21/2018  4:19 PM CDT -----  Contact: pt  Pt stated she returning a call and can be reached at [phone] thanks

## 2018-05-21 NOTE — TELEPHONE ENCOUNTER
----- Message from Madisyn Shah sent at 5/21/2018  2:40 PM CDT -----  Contact: pt  Please call pt @ [phone] regarding prior authorization for glucose testing machine/supplies fax to [employer] @ [phone].

## 2018-07-10 RX ORDER — GLIPIZIDE 5 MG/1
TABLET ORAL
Qty: 45 TABLET | Refills: 0 | Status: SHIPPED | OUTPATIENT
Start: 2018-07-10 | End: 2018-08-13 | Stop reason: SDUPTHER

## 2018-07-10 NOTE — TELEPHONE ENCOUNTER
----- Message from Lupe Luis sent at 7/10/2018 10:07 AM CDT -----  Contact: self/[phone]  Would like to consult with nurse regarding a earlier appt and medication, please call back at [phone], patient states you can leave a message. Thanks/ar

## 2018-07-12 ENCOUNTER — OFFICE VISIT (OUTPATIENT)
Dept: NEUROLOGY | Facility: CLINIC | Age: 78
End: 2018-07-12
Payer: MEDICARE

## 2018-07-12 ENCOUNTER — OFFICE VISIT (OUTPATIENT)
Dept: HEMATOLOGY/ONCOLOGY | Facility: CLINIC | Age: 78
End: 2018-07-12
Payer: MEDICARE

## 2018-07-12 VITALS
WEIGHT: 161.81 LBS | HEIGHT: 63 IN | RESPIRATION RATE: 18 BRPM | OXYGEN SATURATION: 98 % | DIASTOLIC BLOOD PRESSURE: 78 MMHG | TEMPERATURE: 98 F | HEART RATE: 56 BPM | BODY MASS INDEX: 28.67 KG/M2 | SYSTOLIC BLOOD PRESSURE: 140 MMHG

## 2018-07-12 VITALS
HEART RATE: 64 BPM | WEIGHT: 160 LBS | HEIGHT: 63 IN | DIASTOLIC BLOOD PRESSURE: 60 MMHG | SYSTOLIC BLOOD PRESSURE: 138 MMHG | BODY MASS INDEX: 28.35 KG/M2

## 2018-07-12 DIAGNOSIS — Z85.3 HISTORY OF LEFT BREAST CANCER: Primary | ICD-10-CM

## 2018-07-12 DIAGNOSIS — R41.3 MEMORY DEFICIT: ICD-10-CM

## 2018-07-12 DIAGNOSIS — I67.1 ANEURYSM, CEREBRAL: Primary | ICD-10-CM

## 2018-07-12 DIAGNOSIS — I77.9 BILATERAL CAROTID ARTERY DISEASE: ICD-10-CM

## 2018-07-12 PROCEDURE — 99205 OFFICE O/P NEW HI 60 MIN: CPT | Mod: S$GLB,,, | Performed by: PSYCHIATRY & NEUROLOGY

## 2018-07-12 PROCEDURE — 3075F SYST BP GE 130 - 139MM HG: CPT | Mod: CPTII,S$GLB,, | Performed by: PSYCHIATRY & NEUROLOGY

## 2018-07-12 PROCEDURE — 99999 PR PBB SHADOW E&M-EST. PATIENT-LVL III: CPT | Mod: PBBFAC,,, | Performed by: PSYCHIATRY & NEUROLOGY

## 2018-07-12 PROCEDURE — 3078F DIAST BP <80 MM HG: CPT | Mod: CPTII,S$GLB,, | Performed by: INTERNAL MEDICINE

## 2018-07-12 PROCEDURE — 3078F DIAST BP <80 MM HG: CPT | Mod: CPTII,S$GLB,, | Performed by: PSYCHIATRY & NEUROLOGY

## 2018-07-12 PROCEDURE — 99999 PR PBB SHADOW E&M-EST. PATIENT-LVL III: CPT | Mod: PBBFAC,,, | Performed by: INTERNAL MEDICINE

## 2018-07-12 PROCEDURE — 99213 OFFICE O/P EST LOW 20 MIN: CPT | Mod: S$GLB,,, | Performed by: INTERNAL MEDICINE

## 2018-07-12 PROCEDURE — 3074F SYST BP LT 130 MM HG: CPT | Mod: CPTII,S$GLB,, | Performed by: INTERNAL MEDICINE

## 2018-07-12 NOTE — PROGRESS NOTES
Consult Requested By: No att. providers found  Reason for Consult:   1. Dizziness       SUBJECTIVE:       HPI:   HISTORY OF PRESENT ILLNESS:  This is a 77-year-old right-handed pleasant lady,   presented today in the clinic for evaluation of her long-standing dizzy feeling.    She has a long history of dizzy feeling, headache and she has been coming to   [facility] since 2004.  Dr. Diop  neurologist had seen her first time in 2006 for   similar problems.  She has extensive workup in the [facility], also in   [facility].  MRA and MRI of the brain and neck was done.  It was found that she has   carotid arterial system stenosis.  Her brain MRI was okay.  She has been seen   also by Dr. Mason for her problems, last seen by Dr. Mason was 07/12/2011.  After   that, there is no followup in the Neurology Department, but in between time, she   went to the [facility] and MRI of the brain was done in 2015, MRA of the brain   also done in 2015.  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  Currently, she said that her problem is not   always; sometime when she tried to lie down, then she feels this unusual   feeling, mainly on the left side of the head, around the left ear.  She feels   tender around the left ear.  So, it is now continuous and discomfortable, so she   is here.  Otherwise, she is not doing bad and there is no followup for her   neurological problems and also had aneurysms for about three years.              /altagracia [id number] matteo(s)        AK/MATT  dd: 07/12/2018 14:54:25 (CDT)  td: 07/13/2018 09:34:25 (CDT)  Doc ID   #[id number]  Job ID #[id number]    CC:     This office note has been dictated.      Past Medical History:   Diagnosis Date    Breast cancer     CAD (coronary artery disease)     Colon polyp     Diabetes mellitus, type 2     Diverticular disease     Dizziness     External  hemorrhoid     H. pylori infection     Headache     Hyperlipidemia     Hypertension     Insomnia     Osteopenia     Postmenopausal     Tobacco use     Vitamin D deficiency disease      Past Surgical History:   Procedure Laterality Date    BREAST LUMPECTOMY Left     CATARACT EXTRACTION      CHOLECYSTECTOMY      COLONOSCOPY      EYE SURGERY Right     cataract    left lumpectomy      TOTAL ABDOMINAL HYSTERECTOMY W/ BILATERAL SALPINGOOPHORECTOMY      Due to benign reasons per patient    UPPER GASTROINTESTINAL ENDOSCOPY       Family History   Problem Relation Age of Onset    Hypertension Mother     Heart attack Father     Heart disease Father         MI/CAD    Diabetes Daughter     Diabetes Son     No Known Problems Daughter     No Known Problems Son     Cancer Neg Hx     Stroke Neg Hx      Social History   Substance Use Topics    Smoking status: Current Some Day Smoker     Packs/day: 0.40     Years: 40.00     Types: Cigarettes    Smokeless tobacco: Never Used      Comment: Trying to quit as states rarely smokes    Alcohol use 3.6 oz/week     6 Cans of beer per week      Comment: Occasionally     Review of Systems   Constitutional: Negative for fever and weight loss.   HENT: Negative for hearing loss.    Eyes: Negative for blurred vision, double vision, photophobia and pain.   Respiratory: Negative for cough.    Cardiovascular: Negative for chest pain.   Gastrointestinal: Negative for abdominal pain, nausea and vomiting.   Genitourinary: Negative for dysuria, frequency and urgency.   Musculoskeletal: Negative.  Negative for back pain, falls, joint pain, myalgias and neck pain.   Skin: Negative for itching and rash.   Neurological: Positive for dizziness. Negative for tingling and headaches.   Psychiatric/Behavioral: Positive for memory loss. Negative for depression.         OBJECTIVE:     Vital Signs (Most Recent)  Pulse: 64 (07/12/18 1421)  BP: 138/60 (07/12/18 1421)    Physical Exam    Constitutional: She is oriented to person, place, and time. She appears well-developed and well-nourished.   HENT:   Head: Normocephalic and atraumatic.   Eyes: Conjunctivae and EOM are normal. Pupils are equal, round, and reactive to light.   Neck: Normal range of motion. Neck supple. No JVD present. No tracheal deviation present. No thyromegaly present.   Cardiovascular: Normal rate, regular rhythm and normal heart sounds.    Pulmonary/Chest: Effort normal and breath sounds normal.   Abdominal: She exhibits no distension. There is no tenderness.   Musculoskeletal: Normal range of motion. She exhibits no edema or tenderness.   Neurological: She is alert and oriented to person, place, and time. She has normal strength and normal reflexes. She displays normal reflexes. No cranial nerve deficit or sensory deficit. She exhibits normal muscle tone. She displays a negative Romberg sign. Coordination and gait normal.   Reflex Scores:       Tricep reflexes are 2+ on the right side and 2+ on the left side.       Bicep reflexes are 2+ on the right side and 2+ on the left side.       Brachioradialis reflexes are 2+ on the right side and 2+ on the left side.       Patellar reflexes are 2+ on the right side and 2+ on the left side.       Achilles reflexes are 2+ on the right side and 2+ on the left side.  Skin: Skin is warm and dry. No rash noted.   Psychiatric: She has a normal mood and affect. Her behavior is normal. Judgment and thought content normal.       Strength  Deltoids Triceps Biceps Wrist Extension Wrist Flexion Hand    Upper: R 5/5 5/5 5/5 5/5 5/5 5/5    L 5/5 5/5 5/5 5/5 5/5 5/5     Iliopsoas Quadriceps Knee  Flexion Tibialis  anterior Gastro- cnemius EHL   Lower: R 5/5 5/5 5/5 5/5 5/5 5/5    L 5/5 5/5 5/5 5/5 5/5 5/5     Laboratory:  Lab Results   Component Value Date    WBC 8.55 03/16/2018    WBC 8.55 03/16/2018    HGB 14.2 03/16/2018    HGB 14.2 03/16/2018    HCT 42.0 03/16/2018    HCT 42.0 03/16/2018      03/16/2018     03/16/2018    CHOL 218 (H) 03/16/2018    TRIG 107 03/16/2018    HDL 67 03/16/2018    ALT 16 03/16/2018    ALT 16 03/16/2018    AST 15 03/16/2018    AST 15 03/16/2018     03/16/2018     03/16/2018    K 4.2 03/16/2018    K 4.2 03/16/2018     03/16/2018     03/16/2018    CREATININE 0.8 03/16/2018    CREATININE 0.8 03/16/2018    BUN 20 03/16/2018    BUN 20 03/16/2018    CO2 24 03/16/2018    CO2 24 03/16/2018    TSH 1.127 03/16/2018    HGBA1C 5.8 (H) 03/16/2018         Diagnostic Results:      MRI of the brain: 7/24/2015  IMPRESSION:  1. Negative MRI brain noncontrast for age  MRA of the brain: 7/24/2015    IMPRESSION:  1.  Tiny bilateral ophthalmic artery aneurysms. This is most consistent with incidental finding.    Carotid ultrasound; 6/16/20111    IMPRESSION: INCREASING VELOCITIES AND RATIOS COMPARED TO PREVIOUS CAROTID   ULTRASOUND WITH LEVEL OF STENOSIS FELT TO BE APPROACHING 60% BILATERALLY.    ADDITIONAL IMAGING STUDIES SUCH AS CT ANGIOGRAPHY COULD BE PER FORMED IF   CLINICALLY INDICATED  ASSESSMENT/PLAN:     Primary Diagnoses:  .1. Cerebral Aneurysm:  2.Carotid arterial disease.  3. Memeory loss     Patient Active Problem List   Diagnosis    Type II or unspecified type diabetes mellitus without mention of complication, not stated as uncontrolled    Tobacco use disorder    Disorder of bone and cartilage    Asymptomatic postmenopausal status    CAD (coronary artery disease)    Vitamin D deficiency    PAD (peripheral artery disease)    Right shoulder pain    Hypertension associated with diabetes    Combined hyperlipidemia associated with type 2 diabetes mellitus    Epigastric pain    History of left breast cancer    Gastroesophageal reflux disease    Diabetes mellitus with neurological manifestations, controlled    Type 2 diabetes mellitus with peripheral artery disease    Carotid artery disease    Malignant neoplasm of left female breast     Memory deficit    Type 2 diabetes mellitus without retinopathy    Serum calcium elevated        Plan: will see her in a month. After CTA of brain and neck .

## 2018-07-12 NOTE — LETTER
July 12, 2018      Dilma Chan MD  [address]  [place] [address]           [facility]  [address]  [place] [address]  Phone: [phone]          Patient: Danyelle Garcia   MR Number: [id number]   YOB: 1940   Date of Visit: 7/12/2018       Dear Dr. Dilma Chan:    Thank you for referring Danyelle Garcia to me for evaluation. Attached you will find relevant portions of my assessment and plan of care.    If you have questions, please do not hesitate to call me. I look forward to following Danyelle Garcia along with you.    Sincerely,    Zoila Mason MD    Enclosure  CC:  No Recipients    If you would like to receive this communication electronically, please contact [email or web address] or [phone] to request more information on [employer] access.    For providers and/or their staff who would like to refer a patient to [facility], please contact us through our one-stop-shop provider referral line, [facility], at [phone].    If you feel you have received this communication in error or would no longer like to receive these types of communications, please e-mail [email or web address]

## 2018-07-12 NOTE — PROGRESS NOTES
Subjective:       Patient ID: Danyelle Garcia is a 77 y.o. female.    Chief Complaint: Follow-up    HPI He is a 77 year old  lady who comes for follow up of her previously diagnosed triple negative breast cancer.  on  was diagnosed as having cancer of the left breast and underwent  lumpectomy along with sentinel lymph node biopsy. The pathology report from  the [facility] ([facility] indicated that the   patient had an invasive ductal carcinoma of the breast measuring 1.5 cm in   diameter.     The ER receptors and the HER-2 kris tests were negative.   She received 4 cycles of adjuvant AC which she tolerated well.  She had radiation therapy.  She has stable, chronic Bell's palsy  She also has HBP , diabetes and elevated cholesterol for which she takes medications      ALLERGIES: No known drug allergies.     CURRENT MEDICATIONS: see med card.     PREVIOUS SURGERIES: Cholecystectomy, hysterectomy, lumpectomy and sentinel   lymph node biopsy on 3/17/05.     SOCIAL HISTORY: She is single. She has three children. She lives in [place]. She used to smoke for 20 years averaging 4 to 5 cigarettes a day and  stopped a month ago. She drinks about a six pack of beer a month. She used   to work in [employer].     FAMILY DISEASES: No diabetes or cancer in the family. Father  of a   heart attack.     PAST MEDICAL HISTORY:  1-Breast cancer s/p surgery. radiaion therapy and adjuvant chemo.  2-chronic stable Bell's palsy  3-diabetes mellitus  4-elevated cholesterol  Review of Systems   Neurological:        Complains of mild forgetfulness       Objective:      Physical Exam   Constitutional: She is oriented to person, place, and time. She appears well-developed. No distress.   HENT:   Head: Normocephalic.   Right Ear: Tympanic membrane, external ear and ear canal normal.   Left Ear: Tympanic membrane, external ear and ear canal normal.   Nose: Nose normal. Right sinus  exhibits no maxillary sinus tenderness and no frontal sinus tenderness. Left sinus exhibits no maxillary sinus tenderness and no frontal sinus tenderness.   Mouth/Throat: Oropharynx is clear and moist and mucous membranes are normal.   Teeth normal.  Gums normal.   Eyes: Conjunctivae and lids are normal. Pupils are equal, round, and reactive to light.   Neck: Normal carotid pulses, no hepatojugular reflux and no JVD present. Carotid bruit is not present. No tracheal deviation present. No thyroid mass and no thyromegaly present.   Cardiovascular: Normal rate, regular rhythm, S1 normal, S2 normal, normal heart sounds and intact distal pulses.  Exam reveals no gallop and no friction rub.    No murmur heard.  Carotid exam normal   Pulmonary/Chest: Effort normal and breath sounds normal. No accessory muscle usage. No respiratory distress. She has no wheezes. She has no rales. She exhibits no tenderness.   Breast exam shows no masses bilaterally   Abdominal: Soft. Normal appearance. She exhibits no distension and no mass. There is no splenomegaly or hepatomegaly. There is no tenderness. There is no rebound and no guarding.   Musculoskeletal: Normal range of motion. She exhibits no edema or tenderness.        Right hand: Normal.        Left hand: Normal.       Lymphadenopathy:     She has no cervical adenopathy.     She has no axillary adenopathy.        Right: No inguinal and no supraclavicular adenopathy present.        Left: No inguinal and no supraclavicular adenopathy present.   Neurological: She is alert and oriented to person, place, and time. She has normal strength. No cranial nerve deficit. Coordination normal.   Skin: Skin is warm and dry. No rash noted. She is not diaphoretic. No cyanosis or erythema. No pallor. Nails show no clubbing.   Psychiatric: She has a normal mood and affect. Her behavior is normal. Judgment and thought content normal.       Wt Readings from Last 3 Encounters:   07/12/18 73.4 kg (161 lb  13.1 oz)   07/12/18 72.6 kg (160 lb)   03/16/18 72.2 kg (159 lb 2.8 oz)     Temp Readings from Last 3 Encounters:   07/12/18 98.4 °F (36.9 °C) (Oral)   03/16/18 99.2 °F (37.3 °C) (Oral)   03/02/18 97.5 °F (36.4 °C) (Tympanic)     BP Readings from Last 3 Encounters:   07/12/18 (!) 140/78   07/12/18 138/60   03/16/18 (!) 146/62     Pulse Readings from Last 3 Encounters:   07/12/18 (!) 56   07/12/18 64   03/16/18 (!) 50       Assessment:       1. History of left breast cancer        Plan:           She seems stable from the breast cancer point of view. Due for mammogram in Aug 2018.  See MD in 6 months with a cbc/cmp.

## 2018-07-18 ENCOUNTER — TELEPHONE (OUTPATIENT)
Dept: RADIOLOGY | Facility: HOSPITAL | Age: 78
End: 2018-07-18

## 2018-07-19 ENCOUNTER — HOSPITAL ENCOUNTER (OUTPATIENT)
Dept: RADIOLOGY | Facility: HOSPITAL | Age: 78
Discharge: HOME | End: 2018-07-19
Attending: PSYCHIATRY & NEUROLOGY
Payer: MEDICARE

## 2018-07-19 ENCOUNTER — TELEPHONE (OUTPATIENT)
Dept: NEUROLOGY | Facility: CLINIC | Age: 78
End: 2018-07-19

## 2018-07-19 DIAGNOSIS — Z00.00 ROUTINE ADULT HEALTH MAINTENANCE: Primary | ICD-10-CM

## 2018-07-19 DIAGNOSIS — Z85.3 HISTORY OF LEFT BREAST CANCER: Primary | ICD-10-CM

## 2018-07-19 DIAGNOSIS — I67.1 ANEURYSM, CEREBRAL: ICD-10-CM

## 2018-07-19 DIAGNOSIS — I77.9 BILATERAL CAROTID ARTERY DISEASE: ICD-10-CM

## 2018-07-19 PROCEDURE — 70496 CT ANGIOGRAPHY HEAD: CPT | Mod: 26,,, | Performed by: RADIOLOGY

## 2018-07-19 PROCEDURE — 70498 CT ANGIOGRAPHY NECK: CPT | Mod: 26,,, | Performed by: RADIOLOGY

## 2018-07-19 PROCEDURE — 70496 CT ANGIOGRAPHY HEAD: CPT | Mod: TC,PO

## 2018-07-19 PROCEDURE — 25500020 PHARM REV CODE 255: Mod: PO | Performed by: PSYCHIATRY & NEUROLOGY

## 2018-07-19 RX ADMIN — IOHEXOL 75 ML: 350 INJECTION, SOLUTION INTRAVENOUS at 10:07

## 2018-07-20 ENCOUNTER — TELEPHONE (OUTPATIENT)
Dept: NEUROLOGY | Facility: CLINIC | Age: 78
End: 2018-07-20

## 2018-07-20 NOTE — TELEPHONE ENCOUNTER
Called pt to give results per dr wilfredo miramontes realted changes and will talk on return visit.gave follow up appt. 7/20/18/1200/sf

## 2018-08-13 ENCOUNTER — LAB VISIT (OUTPATIENT)
Dept: LAB | Facility: HOSPITAL | Age: 78
End: 2018-08-13
Attending: FAMILY MEDICINE
Payer: MEDICARE

## 2018-08-13 ENCOUNTER — OFFICE VISIT (OUTPATIENT)
Dept: FAMILY MEDICINE | Facility: CLINIC | Age: 78
End: 2018-08-13
Payer: MEDICARE

## 2018-08-13 VITALS
OXYGEN SATURATION: 97 % | DIASTOLIC BLOOD PRESSURE: 78 MMHG | BODY MASS INDEX: 28.3 KG/M2 | SYSTOLIC BLOOD PRESSURE: 132 MMHG | TEMPERATURE: 98 F | WEIGHT: 159.75 LBS | HEIGHT: 63 IN | RESPIRATION RATE: 17 BRPM | HEART RATE: 66 BPM

## 2018-08-13 DIAGNOSIS — R41.3 MEMORY DEFICIT: ICD-10-CM

## 2018-08-13 DIAGNOSIS — E78.2 COMBINED HYPERLIPIDEMIA ASSOCIATED WITH TYPE 2 DIABETES MELLITUS: ICD-10-CM

## 2018-08-13 DIAGNOSIS — E11.69 COMBINED HYPERLIPIDEMIA ASSOCIATED WITH TYPE 2 DIABETES MELLITUS: ICD-10-CM

## 2018-08-13 DIAGNOSIS — Z17.0 MALIGNANT NEOPLASM OF LEFT BREAST IN FEMALE, ESTROGEN RECEPTOR POSITIVE, UNSPECIFIED SITE OF BREAST: ICD-10-CM

## 2018-08-13 DIAGNOSIS — F17.200 TOBACCO USE DISORDER: ICD-10-CM

## 2018-08-13 DIAGNOSIS — I77.9 CAROTID ARTERY DISEASE, UNSPECIFIED LATERALITY: ICD-10-CM

## 2018-08-13 DIAGNOSIS — K59.00 CONSTIPATION, UNSPECIFIED CONSTIPATION TYPE: ICD-10-CM

## 2018-08-13 DIAGNOSIS — I15.2 HYPERTENSION ASSOCIATED WITH DIABETES: ICD-10-CM

## 2018-08-13 DIAGNOSIS — I25.10 CORONARY ARTERY DISEASE INVOLVING NATIVE CORONARY ARTERY OF NATIVE HEART WITHOUT ANGINA PECTORIS: Chronic | ICD-10-CM

## 2018-08-13 DIAGNOSIS — C50.912 MALIGNANT NEOPLASM OF LEFT BREAST IN FEMALE, ESTROGEN RECEPTOR POSITIVE, UNSPECIFIED SITE OF BREAST: ICD-10-CM

## 2018-08-13 DIAGNOSIS — E11.49 CONTROLLED TYPE 2 DIABETES MELLITUS WITH OTHER NEUROLOGIC COMPLICATION, UNSPECIFIED WHETHER LONG TERM INSULIN USE: ICD-10-CM

## 2018-08-13 DIAGNOSIS — I73.9 PAD (PERIPHERAL ARTERY DISEASE): ICD-10-CM

## 2018-08-13 DIAGNOSIS — E11.51 TYPE 2 DIABETES MELLITUS WITH PERIPHERAL ARTERY DISEASE: Primary | ICD-10-CM

## 2018-08-13 DIAGNOSIS — E11.59 HYPERTENSION ASSOCIATED WITH DIABETES: ICD-10-CM

## 2018-08-13 DIAGNOSIS — E11.51 TYPE 2 DIABETES MELLITUS WITH PERIPHERAL ARTERY DISEASE: ICD-10-CM

## 2018-08-13 LAB
ESTIMATED AVG GLUCOSE: 117 MG/DL
HBA1C MFR BLD HPLC: 5.7 %

## 2018-08-13 PROCEDURE — 83036 HEMOGLOBIN GLYCOSYLATED A1C: CPT

## 2018-08-13 PROCEDURE — 99214 OFFICE O/P EST MOD 30 MIN: CPT | Mod: S$GLB,,, | Performed by: FAMILY MEDICINE

## 2018-08-13 PROCEDURE — 99999 PR PBB SHADOW E&M-EST. PATIENT-LVL IV: CPT | Mod: PBBFAC,,, | Performed by: FAMILY MEDICINE

## 2018-08-13 PROCEDURE — 3075F SYST BP GE 130 - 139MM HG: CPT | Mod: CPTII,S$GLB,, | Performed by: FAMILY MEDICINE

## 2018-08-13 PROCEDURE — 36415 COLL VENOUS BLD VENIPUNCTURE: CPT | Mod: PO

## 2018-08-13 PROCEDURE — 3078F DIAST BP <80 MM HG: CPT | Mod: CPTII,S$GLB,, | Performed by: FAMILY MEDICINE

## 2018-08-13 RX ORDER — POLYETHYLENE GLYCOL 3350 17 G/17G
17 POWDER, FOR SOLUTION ORAL
Qty: 595 G | Refills: 0 | Status: SHIPPED | OUTPATIENT
Start: 2018-08-13 | End: 2018-11-15

## 2018-08-13 RX ORDER — GLIPIZIDE 5 MG/1
TABLET ORAL
Qty: 45 TABLET | Refills: 1 | Status: SHIPPED | OUTPATIENT
Start: 2018-08-13 | End: 2019-02-04 | Stop reason: SDUPTHER

## 2018-08-13 RX ORDER — ASPIRIN 81 MG/1
81 TABLET ORAL DAILY
COMMUNITY
End: 2018-11-15 | Stop reason: SDUPTHER

## 2018-08-13 NOTE — PROGRESS NOTES
Danyelle Garcia    Chief Complaint   Patient presents with    Diabetes    Hypertension    Follow-up       History of Present Illness:   Ms. Garcia comes in today for diabetes and hypertension follow-up.  She is not fasting but has taken medications today.  She states she rarely monitor her diet as she eats fried foods and drinks sodas.  She does not exercise.  She reports home glucose levels ranged 120's.    She reports having occasional left knee pain due to arthritis but not today.  She reports having constipation for 2 or more months and states she must take a laxative (Dulcolax) to have a bowel movement.  She later specifies that she has only taken Dulcolax x2 over the last several months.  She states she may go 2 weeks without having a bowel movement.  She denies having associated abdominal pain, nausea, vomiting, diarrhea, chest pain, palpitations, leg swelling, ab leg swelling, shortness of breath, cough, wheezing, fever, chills, fatigue, appetite change, unusual urinary symptoms polydipsia, polyuria, polyphagia, back pain, headaches, anxiety, depression, homicidal or suicidal thoughts.    She states she continues to smoke cigarettes but is trying to quit.    She saw Dr. Finley, hematologist/oncologist, on July 12, 2018 for surveillance left breast cancer with 6-.month follow up with CBC, CMP advised.  She saw Dr. Mason, neurologist, on July 12, 2018 for memory deficit, cerebral aneurysm, bilateral carotid artery disease with CTA brain and neck ordered and 1-month follow-up advised and scheduled for August 17, 2018.  She states she has occasional dizziness.    She states she follows with Dr. Hutchinson, cardiologist, for coronary artery disease surveillance.      Labs:                   WBC                      8.55                03/16/2018               HGB                      14.2                03/16/2018                 HCT                      42.0                03/16/2018               PLT                       199                 03/16/2018                 CHOL                     218 (H)             03/16/2018                 TRIG                     107                 03/16/2018                 HDL                      67                  03/16/2018                 ALT                      16                  03/16/2018                 AST                      15                  03/16/2018                 NA                       137                 03/16/2018              K                        4.2                 03/16/2018                 CL                       101                 03/16/2018                CREATININE               0.8                 07/19/2018                 BUN                      20                  03/16/2018                 CO2                      24                  03/16/2018                 TSH                      1.127               03/16/2018                 HGBA1C                   5.8 (H)             03/16/2018             LDLCALC                  129.6               03/16/2018              Vit D, 25-Hydroxy            26              03/16/2018        Current Outpatient Medications   Medication Sig    aspirin (ECOTRIN) 81 MG EC tablet Take 81 mg by mouth once daily.    atorvastatin (LIPITOR) 80 MG tablet TAKE 1 TABLET BY MOUTH AT BEDTIME    blood glucose control, normal (ACCU-CHEK SMARTVIEW CONTRL SOL) Soln 1 each by Misc.(Non-Drug; Combo Route) route 2 (two) times daily.    blood sugar diagnostic Strp 1 strip by Misc.(Non-Drug; Combo Route) route 2 (two) times daily.    blood-glucose meter kit Use as instructed    glipiZIDE (GLUCOTROL) 5 MG tablet TAKE 1/2 TABLET BY MOUTH WITH BREAKFAST    lancets (TRUEPLUS LANCETS) 30 gauge Misc Inject 1 lancet into the skin 2 (two) times daily.    losartan (COZAAR) 50 MG tablet TAKE 1 TABLET(50 MG) BY MOUTH EVERY DAY    pantoprazole (PROTONIX) 40 MG tablet TK 1 T PO  ONCE D    calcium carbonate (OS-DAMIAN) 600 mg (1,500  mg) Tab Take 600 mg by mouth 2 (two) times daily with meals.         Review of Systems   Constitutional: Negative for activity change, appetite change, chills, fatigue and fever.        Weight  72.5 kg (159 lb 13.3 oz) at March 2, 2018 visit.   Eyes:        See history of present illness.   Respiratory: Negative for cough, shortness of breath and wheezing.    Cardiovascular: Negative for chest pain, palpitations and leg swelling.   Gastrointestinal: Positive for constipation. Negative for abdominal pain, diarrhea, nausea and vomiting.        See history of present illness.   Endocrine: Negative for polydipsia, polyphagia and polyuria.        See history of present illness.   Genitourinary: Negative for difficulty urinating.        Chronic.   Musculoskeletal: Positive for arthralgias. Negative for back pain.   Neurological: Positive for dizziness. Negative for headaches.        See history of present illness.   Hematological:        See history of present illness.   Psychiatric/Behavioral: Negative for dysphoric mood and suicidal ideas. The patient is not nervous/anxious.         See history of present illness. Negative for homicidal ideas.       Objective:  Physical Exam   Constitutional: She is oriented to person, place, and time. She appears well-developed and well-nourished. No distress.   Elderly and pleasant.   Neck: Normal range of motion. Neck supple. No thyromegaly present.   Cardiovascular: Normal rate and regular rhythm.   Pulses:       Dorsalis pedis pulses are 2+ on the right side, and 1+ on the left side.        Posterior tibial pulses are 1+ on the right side, and 1+ on the left side.   Chronic decreased pedal pulses.   Pulmonary/Chest: Effort normal and breath sounds normal. No respiratory distress. She has no wheezes. Left breast exhibits no inverted nipple, no mass, no nipple discharge, no skin change and no tenderness. There is no breast swelling.   Abdominal: Soft. Bowel sounds are normal. She  exhibits no distension and no mass. There is no tenderness. There is no rebound and no guarding.   Genitourinary: No breast tenderness or discharge.   Musculoskeletal: Normal range of motion. She exhibits no edema or tenderness.   She is ambulatory without problems. Feet look okay without ulcerations or skin breaks.    Feet:   Right Foot:   Protective Sensation: 5 sites tested. 5 sites sensed.   Skin Integrity: Negative for ulcer or skin breakdown.   Left Foot:   Protective Sensation: 5 sites tested. 5 sites sensed.   Skin Integrity: Negative for ulcer or skin breakdown.   Lymphadenopathy:     She has no cervical adenopathy.     She has no axillary adenopathy.   Neurological: She is alert and oriented to person, place, and time.   Skin: No rash noted. She is not diaphoretic. No erythema.   Psychiatric: She has a normal mood and affect. Her behavior is normal. Judgment and thought content normal.   Vitals reviewed.      ASSESSMENT:  1. Type 2 diabetes mellitus with peripheral artery disease    2. Controlled type 2 diabetes mellitus with other neurologic complication, unspecified whether long term insulin use    3. Hypertension associated with diabetes    4. Combined hyperlipidemia associated with type 2 diabetes mellitus    5. PAD (peripheral artery disease)    6. Carotid artery disease, unspecified laterality    7. Coronary artery disease involving native coronary artery of native heart without angina pectoris    8. Tobacco use disorder    9. Malignant neoplasm of left breast in female, estrogen receptor positive, unspecified site of breast    10. Memory deficit    11. Constipation, unspecified constipation type        PLAN:  Danyelle was seen today for diabetes, hypertension and follow-up.    Diagnoses and all orders for this visit:    Type 2 diabetes mellitus with peripheral artery disease  -     Hemoglobin A1c; Future  -     glipiZIDE (GLUCOTROL) 5 MG tablet; TAKE 1/2 TABLET BY MOUTH WITH BREAKFAST    Controlled  type 2 diabetes mellitus with other neurologic complication, unspecified whether long term insulin use    Hypertension associated with diabetes    Combined hyperlipidemia associated with type 2 diabetes mellitus    PAD (peripheral artery disease)    Carotid artery disease, unspecified laterality    Coronary artery disease involving native coronary artery of native heart without angina pectoris    Tobacco use disorder    Malignant neoplasm of left breast in female, estrogen receptor positive, unspecified site of breast    Memory deficit    Constipation, unspecified constipation type  -     polyethylene glycol (GLYCOLAX) 17 gram/dose powder; Take 17 g by mouth every 7 days.    Patient advised to call for results.  Continue current medications, follow low sodium, low cholesterol, low carb diet, daily walks.  Add Miralax once a week as directed.  Prescription refills as noted above.  Keep follow up with specialists.  Flu shot this fall.  Smoking cessation advised.  Follow-up in about 3 months (around 11/13/2018) for diabetes and hypertension follow up.

## 2018-08-17 ENCOUNTER — OFFICE VISIT (OUTPATIENT)
Dept: NEUROLOGY | Facility: CLINIC | Age: 78
End: 2018-08-17
Payer: MEDICARE

## 2018-08-17 VITALS
BODY MASS INDEX: 27.18 KG/M2 | DIASTOLIC BLOOD PRESSURE: 64 MMHG | SYSTOLIC BLOOD PRESSURE: 152 MMHG | HEART RATE: 56 BPM | HEIGHT: 64 IN | WEIGHT: 159.19 LBS

## 2018-08-17 DIAGNOSIS — R41.3 MEMORY LOSS: Primary | ICD-10-CM

## 2018-08-17 DIAGNOSIS — H81.10 BENIGN PAROXYSMAL POSITIONAL VERTIGO, UNSPECIFIED LATERALITY: ICD-10-CM

## 2018-08-17 PROCEDURE — 3077F SYST BP >= 140 MM HG: CPT | Mod: CPTII,S$GLB,, | Performed by: PSYCHIATRY & NEUROLOGY

## 2018-08-17 PROCEDURE — 99999 PR PBB SHADOW E&M-EST. PATIENT-LVL IV: CPT | Mod: PBBFAC,,, | Performed by: PSYCHIATRY & NEUROLOGY

## 2018-08-17 PROCEDURE — 99215 OFFICE O/P EST HI 40 MIN: CPT | Mod: S$GLB,,, | Performed by: PSYCHIATRY & NEUROLOGY

## 2018-08-17 PROCEDURE — 3078F DIAST BP <80 MM HG: CPT | Mod: CPTII,S$GLB,, | Performed by: PSYCHIATRY & NEUROLOGY

## 2018-08-17 NOTE — PROGRESS NOTES
Follow up:   1. Dizziness       SUBJECTIVE:       HPI:   HISTORY OF PRESENT ILLNESS:  This is a 77-year-old right-handed lady seen by me   last time for dizziness and also she was telling that she had aneurysm.  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  CTA of the head has been ordered.  She is here to go over it, but her   complaint of dizziness when she lies down on her left side, persisting.  She   said that when she gets up quickly, she gets dizzy.  It is going on for a while.    Apart from that, she said that her short-term memory is getting worse.  She gets   sometimes confused, sometimes when she is on the highway driving she does not   know why she is driving to.  This kind of confusion is bothering her a lot.  She   said that she has so many things on her place.  She is frustrated that nobody   is answering her complaints.  She never did mistake.  She lives alone.  She does   her personal activities herself and ADL herself.  She does her medication   herself.      AK/MATT  dd: 08/17/2018 11:45:56 (CDT)  td: 08/18/2018 07:33:12 (CDT)  Doc ID   #[id number]  Job ID #[id number]    CC:     This office note has been dictated.      Past Medical History:   Diagnosis Date    Breast cancer     CAD (coronary artery disease)     Colon polyp     Diabetes mellitus, type 2     Diverticular disease     Dizziness     External hemorrhoid     H. pylori infection     Headache     Hyperlipidemia     Hypertension     Insomnia     Osteopenia     Postmenopausal     Tobacco use     Vitamin D deficiency disease      Past Surgical History:   Procedure Laterality Date    BREAST LUMPECTOMY Left     CATARACT EXTRACTION      CHOLECYSTECTOMY      COLONOSCOPY      EYE SURGERY Right     cataract    left lumpectomy      TOTAL ABDOMINAL HYSTERECTOMY W/ BILATERAL SALPINGOOPHORECTOMY      Due to benign reasons per patient    UPPER GASTROINTESTINAL ENDOSCOPY       Family History    Problem Relation Age of Onset    Hypertension Mother     Heart attack Father     Heart disease Father         MI/CAD    Diabetes Daughter     Diabetes Son     No Known Problems Daughter     No Known Problems Son     Cancer Neg Hx     Stroke Neg Hx      Social History   Substance Use Topics    Smoking status: Current Some Day Smoker     Packs/day: 0.40     Years: 40.00     Types: Cigarettes    Smokeless tobacco: Never Used      Comment: Trying to quit as states rarely smokes    Alcohol use 3.6 oz/week     6 Cans of beer per week      Comment: Occasionally     Review of Systems   Constitutional: Negative for fever and weight loss.   HENT: Negative for hearing loss.    Eyes: Negative for blurred vision, double vision, photophobia and pain.   Respiratory: Negative for cough.    Cardiovascular: Negative for chest pain.   Gastrointestinal: Negative for abdominal pain, nausea and vomiting.   Genitourinary: Negative for dysuria, frequency and urgency.   Musculoskeletal: Negative.  Negative for back pain, falls, joint pain, myalgias and neck pain.   Skin: Negative for itching and rash.   Neurological: Positive for dizziness. Negative for tingling and headaches.   Psychiatric/Behavioral: Positive for memory loss. Negative for depression.         OBJECTIVE:     Physical Exam   Constitutional: She is oriented to person, place, and time. She appears well-developed and well-nourished.   HENT:   Head: Normocephalic and atraumatic.   Eyes: Conjunctivae and EOM are normal. Pupils are equal, round, and reactive to light.   Neck: Normal range of motion. Neck supple. No JVD present. No tracheal deviation present. No thyromegaly present.   Cardiovascular: Normal rate, regular rhythm and normal heart sounds.    Pulmonary/Chest: Effort normal and breath sounds normal.   Abdominal: She exhibits no distension. There is no tenderness.   Musculoskeletal: Normal range of motion. She exhibits no edema or tenderness.    Neurological: She is alert and oriented to person, place, and time. She has normal strength and normal reflexes. She displays normal reflexes. No cranial nerve deficit or sensory deficit. She exhibits normal muscle tone. She displays a negative Romberg sign. Coordination and gait normal.   Reflex Scores:       Tricep reflexes are 2+ on the right side and 2+ on the left side.       Bicep reflexes are 2+ on the right side and 2+ on the left side.       Brachioradialis reflexes are 2+ on the right side and 2+ on the left side.       Patellar reflexes are 2+ on the right side and 2+ on the left side.       Achilles reflexes are 2+ on the right side and 2+ on the left side.  Skin: Skin is warm and dry. No rash noted.   Psychiatric: She has a normal mood and affect. Her behavior is normal. Judgment and thought content normal.       Strength  Deltoids Triceps Biceps Wrist Extension Wrist Flexion Hand    Upper: R 5/5 5/5 5/5 5/5 5/5 5/5    L 5/5 5/5 5/5 5/5 5/5 5/5     Iliopsoas Quadriceps Knee  Flexion Tibialis  anterior Gastro- cnemius EHL   Lower: R 5/5 5/5 5/5 5/5 5/5 5/5    L 5/5 5/5 5/5 5/5 5/5 5/5     Laboratory:  Lab Results   Component Value Date    WBC 8.55 03/16/2018    WBC 8.55 03/16/2018    HGB 14.2 03/16/2018    HGB 14.2 03/16/2018    HCT 42.0 03/16/2018    HCT 42.0 03/16/2018     03/16/2018     03/16/2018    CHOL 218 (H) 03/16/2018    TRIG 107 03/16/2018    HDL 67 03/16/2018    ALT 16 03/16/2018    ALT 16 03/16/2018    AST 15 03/16/2018    AST 15 03/16/2018     03/16/2018     03/16/2018    K 4.2 03/16/2018    K 4.2 03/16/2018     03/16/2018     03/16/2018    CREATININE 0.8 03/16/2018    CREATININE 0.8 03/16/2018    BUN 20 03/16/2018    BUN 20 03/16/2018    CO2 24 03/16/2018    CO2 24 03/16/2018    TSH 1.127 03/16/2018    HGBA1C 5.8 (H) 03/16/2018         Diagnostic Results:      MRI of the brain: 7/24/2015  IMPRESSION:  1. Negative MRI brain noncontrast for age  MRA  of the brain: 7/24/2015    IMPRESSION:  1.  Tiny bilateral ophthalmic artery aneurysms. This is most consistent with incidental finding.    Carotid ultrasound; 6/16/20111    IMPRESSION: INCREASING VELOCITIES AND RATIOS COMPARED TO PREVIOUS CAROTID   ULTRASOUND WITH LEVEL OF STENOSIS FELT TO BE APPROACHING 60% BILATERALLY.    ADDITIONAL IMAGING STUDIES SUCH AS CT ANGIOGRAPHY COULD BE PER FORMED IF   CLINICALLY INDICATED    CTA neck/ brain: 7/19/2018  Impression       1. Approximately 50% narrowing noted at the origin of the left common carotid artery.  Less than 50% narrowing noted at the origin of either ICA.  2. No evidence to suggest aneurysm, significant stenosis or occlusion intracranially.           ASSESSMENT/PLAN:     Primary Diagnoses:  .1. Cerebral Aneurysm, not seen by CTA of brain.  2.Carotid arterial disease. Mild.  3. Memeory loss : may be age related.  4. Dizziness: rule BPPV , refer to ENT.    Patient Active Problem List   Diagnosis    Type II or unspecified type diabetes mellitus without mention of complication, not stated as uncontrolled    Tobacco use disorder    Disorder of bone and cartilage    Asymptomatic postmenopausal status    CAD (coronary artery disease)    Vitamin D deficiency    PAD (peripheral artery disease)    Right shoulder pain    Hypertension associated with diabetes    Combined hyperlipidemia associated with type 2 diabetes mellitus    Epigastric pain    History of left breast cancer    Gastroesophageal reflux disease    Diabetes mellitus with neurological manifestations, controlled    Type 2 diabetes mellitus with peripheral artery disease    Carotid artery disease    Malignant neoplasm of left female breast    Memory deficit    Type 2 diabetes mellitus without retinopathy    Serum calcium elevated        Plan:  MRI of the brain and ENT referral.  Time spent: 30 minutes in face to face discussion concerning diagnosis, prognosis, review of lab and test results,  benefits of treatment as well as management of disease, counseling of patient and coordination of care between various health care providers . Greater than half the time spent was used for coordination of care and counseling of patient. This note may have some spelling or wording mistakes which might have been overlooked during proof reading.

## 2018-08-23 ENCOUNTER — HOSPITAL ENCOUNTER (OUTPATIENT)
Dept: RADIOLOGY | Facility: HOSPITAL | Age: 78
Discharge: HOME | End: 2018-08-23
Attending: INTERNAL MEDICINE
Payer: MEDICARE

## 2018-08-23 VITALS — HEIGHT: 64 IN | BODY MASS INDEX: 27.14 KG/M2 | WEIGHT: 159 LBS

## 2018-08-23 DIAGNOSIS — E11.51 TYPE 2 DIABETES MELLITUS WITH PERIPHERAL ARTERY DISEASE: ICD-10-CM

## 2018-08-23 DIAGNOSIS — Z85.3 HISTORY OF LEFT BREAST CANCER: ICD-10-CM

## 2018-08-23 DIAGNOSIS — Z12.39 BREAST CANCER SCREENING: ICD-10-CM

## 2018-08-23 PROCEDURE — 77067 SCR MAMMO BI INCL CAD: CPT | Mod: 26,,, | Performed by: RADIOLOGY

## 2018-08-23 PROCEDURE — 77063 BREAST TOMOSYNTHESIS BI: CPT | Mod: 26,,, | Performed by: RADIOLOGY

## 2018-08-23 PROCEDURE — 77063 BREAST TOMOSYNTHESIS BI: CPT | Mod: TC,PO

## 2018-08-28 ENCOUNTER — TELEPHONE (OUTPATIENT)
Dept: RADIOLOGY | Facility: HOSPITAL | Age: 78
End: 2018-08-28

## 2018-08-29 ENCOUNTER — CLINICAL SUPPORT (OUTPATIENT)
Dept: AUDIOLOGY | Facility: CLINIC | Age: 78
End: 2018-08-29
Payer: MEDICARE

## 2018-08-29 ENCOUNTER — HOSPITAL ENCOUNTER (OUTPATIENT)
Dept: RADIOLOGY | Facility: HOSPITAL | Age: 78
Discharge: HOME | End: 2018-08-29
Attending: PSYCHIATRY & NEUROLOGY
Payer: MEDICARE

## 2018-08-29 ENCOUNTER — TELEPHONE (OUTPATIENT)
Dept: NEUROLOGY | Facility: CLINIC | Age: 78
End: 2018-08-29

## 2018-08-29 DIAGNOSIS — R42 DIZZINESS: Primary | ICD-10-CM

## 2018-08-29 DIAGNOSIS — H90.5 HEARING LOSS, SENSORINEURAL, COMBINED TYPES: ICD-10-CM

## 2018-08-29 DIAGNOSIS — H81.10 BENIGN PAROXYSMAL POSITIONAL VERTIGO, UNSPECIFIED LATERALITY: ICD-10-CM

## 2018-08-29 DIAGNOSIS — R41.3 MEMORY LOSS: ICD-10-CM

## 2018-08-29 PROCEDURE — 92567 TYMPANOMETRY: CPT | Mod: S$GLB,,, | Performed by: AUDIOLOGIST

## 2018-08-29 PROCEDURE — 92557 COMPREHENSIVE HEARING TEST: CPT | Mod: S$GLB,,, | Performed by: AUDIOLOGIST

## 2018-08-29 PROCEDURE — 25500020 PHARM REV CODE 255: Mod: PO | Performed by: PSYCHIATRY & NEUROLOGY

## 2018-08-29 PROCEDURE — 70553 MRI BRAIN STEM W/O & W/DYE: CPT | Mod: TC,PO

## 2018-08-29 PROCEDURE — 70553 MRI BRAIN STEM W/O & W/DYE: CPT | Mod: 26,,, | Performed by: RADIOLOGY

## 2018-08-29 PROCEDURE — A9585 GADOBUTROL INJECTION: HCPCS | Mod: PO | Performed by: PSYCHIATRY & NEUROLOGY

## 2018-08-29 RX ORDER — GADOBUTROL 604.72 MG/ML
7 INJECTION INTRAVENOUS
Status: COMPLETED | OUTPATIENT
Start: 2018-08-29 | End: 2018-08-29

## 2018-08-29 RX ADMIN — GADOBUTROL 7 ML: 604.72 INJECTION INTRAVENOUS at 09:08

## 2018-08-29 NOTE — PROGRESS NOTES
Danyelle Garcia was seen 08/29/2018 for an audiological evaluation.  Patient complains of dizziness for years. She says it's worse when she lays back in bed. Episodes are very brief. She says her left ear and her left side of her head aches often. She denies any tinnitus or hearing loss.     Results reveal normal hearing sensitivity 250-4000 Hz and a mild hearing loss at 8k HZ bilaterally.  Speech Reception Thresholds were  10 dBHL for the right ear and 10 dBHL for the left ear.   Word recognition scores were excellent bilaterally.  Tympanograms were Type A, normal for the right ear and Type A, normal for the left ear.    Negative Marlee-quinteros pike bilaterally.     Patient was offered to see Dr. Nguyễn today, but she could not wait.     Patient was counseled on the above findings.    Recommendations include:    1.  ENT followup  2.  Vestibular work up per ENT  3.  Wear hearing protective devices around loud noise  4.  Annual audiograms

## 2018-09-07 ENCOUNTER — TELEPHONE (OUTPATIENT)
Dept: OTOLARYNGOLOGY | Facility: CLINIC | Age: 78
End: 2018-09-07

## 2018-09-07 NOTE — TELEPHONE ENCOUNTER
----- Message from Jose Logan sent at 9/7/2018  9:48 AM CDT -----  Contact: Pt  Please give pt a call at ..[phone] (home) regarding her appt being rescheduled and it won't allow me to reschedule due to hearing test not being attached because she's already completed it.

## 2018-10-15 ENCOUNTER — TELEPHONE (OUTPATIENT)
Dept: NEUROLOGY | Facility: CLINIC | Age: 78
End: 2018-10-15

## 2018-10-15 NOTE — TELEPHONE ENCOUNTER
----- Message from Angelica Tovar sent at 10/15/2018  9:41 AM CDT -----  Contact: pt   Call pt regarding MRI results.       .[phone] (oqml)

## 2018-10-15 NOTE — TELEPHONE ENCOUNTER
Pt called for her results of mri and carotid results. Called her after done but she didn't answer so l/m she is confused about which dr did what. And doesn't understand why her mamory comes and goes. She did say she went to  ent also. But she doesn't remember which one.rsults given. 10/15/18/1040/sf

## 2018-10-22 DIAGNOSIS — E78.5 HYPERLIPIDEMIA, UNSPECIFIED HYPERLIPIDEMIA TYPE: ICD-10-CM

## 2018-10-22 RX ORDER — ATORVASTATIN CALCIUM 80 MG/1
80 TABLET, FILM COATED ORAL NIGHTLY
Qty: 90 TABLET | Refills: 1 | Status: SHIPPED | OUTPATIENT
Start: 2018-10-22 | End: 2019-11-11 | Stop reason: SDUPTHER

## 2018-10-22 RX ORDER — LOSARTAN POTASSIUM 50 MG/1
TABLET ORAL
Qty: 90 TABLET | Refills: 1 | Status: SHIPPED | OUTPATIENT
Start: 2018-10-22 | End: 2019-06-25 | Stop reason: SDUPTHER

## 2018-10-22 RX ORDER — PANTOPRAZOLE SODIUM 40 MG/1
TABLET, DELAYED RELEASE ORAL
Qty: 90 TABLET | Refills: 1 | Status: SHIPPED | OUTPATIENT
Start: 2018-10-22 | End: 2019-06-25 | Stop reason: SDUPTHER

## 2018-10-22 NOTE — TELEPHONE ENCOUNTER
----- Message from Shanna Valdez sent at 10/22/2018 11:00 AM CDT -----  ...1. What is the name of the medication you are requesting? Vasator 80 mg glipazide 5 g panttorazole 40 mg lazorartn 50 mg   2. What is the dose? n/a  3. How do you take the medication? Orally, topically, etc? orally  4. How often do you take this medication? daily  5. Do you need a 30 day or 90 day supply? 90  6. How many refills are you requesting? 4  7. What is your preferred pharmacy and location of the pharmacy? ..  [id number] - [place] - [address] AT [facility]  [address] [address]  Phone: [phone] Fax: [phone]    8. Who can we contact with further questions? Please call pt back at [phone]

## 2018-11-07 ENCOUNTER — TELEPHONE (OUTPATIENT)
Dept: FAMILY MEDICINE | Facility: CLINIC | Age: 78
End: 2018-11-07

## 2018-11-07 NOTE — TELEPHONE ENCOUNTER
----- Message from Jimena Serna sent at 11/7/2018  3:15 PM CST -----  Contact: self  Pt is returning call. Please call back at [phone].      Thanks,   Jimena Serna

## 2018-11-15 ENCOUNTER — OFFICE VISIT (OUTPATIENT)
Dept: OTOLARYNGOLOGY | Facility: CLINIC | Age: 78
End: 2018-11-15
Payer: MEDICARE

## 2018-11-15 VITALS
DIASTOLIC BLOOD PRESSURE: 57 MMHG | TEMPERATURE: 98 F | BODY MASS INDEX: 27.1 KG/M2 | HEIGHT: 64 IN | HEART RATE: 57 BPM | WEIGHT: 158.75 LBS | SYSTOLIC BLOOD PRESSURE: 163 MMHG

## 2018-11-15 DIAGNOSIS — R42 DIZZINESS: Primary | ICD-10-CM

## 2018-11-15 DIAGNOSIS — H90.3 SENSORINEURAL HEARING LOSS (SNHL) OF BOTH EARS: ICD-10-CM

## 2018-11-15 PROCEDURE — 3078F DIAST BP <80 MM HG: CPT | Mod: CPTII,S$GLB,, | Performed by: PHYSICIAN ASSISTANT

## 2018-11-15 PROCEDURE — 99999 PR PBB SHADOW E&M-EST. PATIENT-LVL IV: CPT | Mod: PBBFAC,,, | Performed by: PHYSICIAN ASSISTANT

## 2018-11-15 PROCEDURE — 3077F SYST BP >= 140 MM HG: CPT | Mod: CPTII,S$GLB,, | Performed by: PHYSICIAN ASSISTANT

## 2018-11-15 PROCEDURE — 99213 OFFICE O/P EST LOW 20 MIN: CPT | Mod: S$GLB,,, | Performed by: PHYSICIAN ASSISTANT

## 2018-11-15 PROCEDURE — 1101F PT FALLS ASSESS-DOCD LE1/YR: CPT | Mod: CPTII,S$GLB,, | Performed by: PHYSICIAN ASSISTANT

## 2018-11-15 RX ORDER — MELOXICAM 15 MG/1
TABLET ORAL
Refills: 0 | COMMUNITY
Start: 2018-10-24 | End: 2019-01-22

## 2018-11-15 NOTE — PROGRESS NOTES
Subjective:       Patient ID: Danyelle Garcia is a 78 y.o. female.    Chief Complaint: Follow-up and Dizziness    Patient is a very pleasant 78 y.o. female here to see me today for the first time for evaluation of dizziness.   She reports that the symptoms have been present for the last 5+ years.  On average, the patent reports symptoms that occur almost everyday.  She describes aching in her left temporal region and says her left ear is sometimes tender to touch (maybe once a month, lasts few minutes).  She often gets a brief dizzy sensation when she lies down; seems worse on her left side.  She describes the dizziness as a sensation of movement of surroundings and says that it lasts less than 1 minute.   She denies aural pressure, otorrhea, tinnitus and hearing loss.  She has not started any new medications, and has not had any recent dietary changes.  She had right-sided Bell's palsy in early 2000's with some residual right facial weakness.  She also had workup in the past MRI/MRA Brain (2015) that showed tiny bilateral ophthalmic artery aneurysms.  She recently saw Neurology for her dizziness and had repeat MRI Brain (August 2018 - normal) and CTA Head/Neck (July 2018) - no aneurysm.  She is not taking any medication for dizziness.  She sometimes takes Tylenol Extra Strength for her headaches.  She also complains of watery drainage from right eye; worse after cataract removal.        Review of Systems   Constitutional: Negative for fever.   HENT: Positive for congestion (right is worse) and ear pain (occasional AS). Negative for ear discharge, hearing loss, postnasal drip, rhinorrhea, sinus pressure, sinus pain, tinnitus and trouble swallowing.    Eyes: Positive for discharge (watery - right).   Respiratory: Negative for cough.    Neurological: Positive for dizziness, weakness (right face - hx of Bell's palsy) and headaches. Negative for speech difficulty.   Psychiatric/Behavioral: Positive for confusion  (memory loss).       Objective:      Physical Exam   Constitutional: She is oriented to person, place, and time. She appears well-developed and well-nourished. She is cooperative. No distress.   HENT:   Head: Normocephalic and atraumatic.   Right Ear: Tympanic membrane, external ear and ear canal normal. No middle ear effusion.   Left Ear: Tympanic membrane, external ear and ear canal normal.  No middle ear effusion.   Nose: Mucosal edema and septal deviation present. No rhinorrhea or nasal deformity. No epistaxis. Right sinus exhibits no maxillary sinus tenderness and no frontal sinus tenderness. Left sinus exhibits no maxillary sinus tenderness and no frontal sinus tenderness.   Mouth/Throat: Uvula is midline, oropharynx is clear and moist and mucous membranes are normal. Mucous membranes are not pale and not dry. She has dentures (upper). No trismus in the jaw. Normal dentition. No uvula swelling. No oropharyngeal exudate or posterior oropharyngeal erythema.   Eyes: Conjunctivae, EOM and lids are normal. Pupils are equal, round, and reactive to light. Right eye exhibits no chemosis. Left eye exhibits no chemosis. Right conjunctiva is not injected. Left conjunctiva is not injected. No scleral icterus. Right eye exhibits normal extraocular motion and no nystagmus. Left eye exhibits normal extraocular motion and no nystagmus.   Neck: Trachea normal and phonation normal. No tracheal tenderness present. No tracheal deviation present. No thyroid mass and no thyromegaly present.   Cardiovascular: Intact distal pulses.   Pulmonary/Chest: Effort normal. No stridor. No respiratory distress.   Abdominal: She exhibits no distension.   Lymphadenopathy:        Head (right side): No submental, no submandibular, no preauricular and no posterior auricular adenopathy present.        Head (left side): No submental, no submandibular, no preauricular and no posterior auricular adenopathy present.     She has no cervical adenopathy.    Neurological: She is alert and oriented to person, place, and time. A cranial nerve deficit is present.   Mouth symmetric at rest with mild droop of right lower lip with smile   Skin: Skin is warm and dry. No rash noted. No erythema.   Psychiatric: She has a normal mood and affect. Her behavior is normal.         Audiogram 8/2018:  Results reveal normal hearing sensitivity 250-4000 Hz and a mild hearing loss at 8k HZ bilaterally.  Speech Reception Thresholds were  10 dBHL for the right ear and 10 dBHL for the left ear.   Word recognition scores were excellent bilaterally.  Tympanograms were Type A, normal for the right ear and Type A, normal for the left ear.    Negative Marlee-quinteros pike bilaterally.                 Assessment:       1. Dizziness    2. Sensorineural hearing loss (SNHL) of both ears        Plan:         I had a long discussion with the patient regarding their symptoms.  Dizziness, vertigo and disequilibrium are common symptoms reported by adults during visits to their doctors. They are all symptoms that can result from a peripheral vestibular disorder (a dysfunction of the balance organs of the inner ear) or central vestibular disorder (a dysfunction of one or more parts of the central nervous system that help process balance and spatial information).  There are also non-vestibular causes of dizziness, and dizziness can be linked to a wide array of problems such as blood-flow irregularities from cardiovascular problems and blood pressure fluctuations.  I would recommend a VNG for further diagnostic testing, and will contact the patient with further recommendations when that is complete.    We reviewed the patient's recent audiogram from August and hearing loss in detail.  Recommend annual audiograms.  We also discussed the use hearing protection when exposed to loud noise, including lawn equipment.

## 2018-11-28 DIAGNOSIS — E08.40 DIABETES MELLITUS DUE TO UNDERLYING CONDITION, CONTROLLED, WITH DIABETIC NEUROPATHY, UNSPECIFIED WHETHER LONG TERM INSULIN USE: Primary | ICD-10-CM

## 2018-12-04 ENCOUNTER — CLINICAL SUPPORT (OUTPATIENT)
Dept: AUDIOLOGY | Facility: CLINIC | Age: 78
End: 2018-12-04
Payer: MEDICARE

## 2018-12-04 DIAGNOSIS — H81.12 BPPV (BENIGN PAROXYSMAL POSITIONAL VERTIGO), LEFT: Primary | ICD-10-CM

## 2018-12-04 PROCEDURE — 92540 BASIC VESTIBULAR EVALUATION: CPT | Mod: S$GLB,,, | Performed by: AUDIOLOGIST-HEARING AID FITTER

## 2018-12-04 NOTE — PROGRESS NOTES
Referring provider: Sana Stevens PA-C    Danyelle Garcia was seen 12/04/2018 for Videonystagmography (VNG) testing.    Dizzy symptoms began nearly three years ago.  She describes dizziness and spinning when lying on her left side in bed. It lasts for 1-3 minutes.      Oculomotor function tests (sinusoidal tracking, saccade and OPKs) were essentially normal and symmetric.  Spontaneous nystagmus was absent.  Gaze nystagmus was absent.  Head-shake test was absent for after head shake nystagmus.  Erlanger-Hallpike Left was positive for BPPV: robust upward and leftward torsional nystagmus was elicited with vertigo; response did fatigue (SPV: 4 d/s LB, 5 d/s UB)  Marlee-Hallpike Right was negative for BPPV.  Static Positional nystagmus was absent to head center and head right; 2 d/s LB to head left.  Calorics were not tested due to positive for BPPV.  Rather CRM was performed.      Impressions: Left BPPV, posterior canal canalithiasis.  Otherwise, normal VNG. Calorics were not tested so that patient may obtain CRM.  Will test calorics at another visit, as needed.   A 5-position Epley maneuver was completed to the left  X2.  Patient tolerated the maneuver well.    Epley #1: Robust upward and leftward nystagmus; no dizziness upon completion  Epley #2: Robust horizontal geotropic nystagmus that fatigued; no dizziness upon completion    Rec:  1. Head restrictions, one week, following today's Epley  2. Recheck BPPV in one week.  I discussed with patient possible need for VRT referral if BPPV does not resolve or if horizontal SCC is also involved.    3. Calorics test as needed.     Tracings are scanned into computer.

## 2018-12-06 ENCOUNTER — TELEPHONE (OUTPATIENT)
Dept: AUDIOLOGY | Facility: CLINIC | Age: 78
End: 2018-12-06

## 2018-12-06 NOTE — TELEPHONE ENCOUNTER
----- Message from Lupe Luis sent at 12/6/2018 11:37 AM CST -----  Contact: self/[phone]  Would like to consult with nurse regarding her canceling her appt. Please call back at [phone]. Thanks/ar    Patient felt dizzy following CRM for the remainder of the night and she does not wish to continue with further testing.  I explained I could check caloris without repositioning so that we have complete assessment.  I also discussed VRT option. She declines at this time.  She will call back to complete testing if she changes her mind.

## 2018-12-18 DIAGNOSIS — E11.51 TYPE 2 DIABETES MELLITUS WITH PERIPHERAL ARTERY DISEASE: ICD-10-CM

## 2018-12-18 NOTE — TELEPHONE ENCOUNTER
----- Message from Husam Geronimo sent at 12/18/2018  8:26 AM CST -----  Contact: pt  1. What is the name of the medication you are requesting? glipizide  2. What is the dose? 5 mg  3. How do you take the medication? Orally, topically, etc? Orally  4. How often do you take this medication? Once daily  5. Do you need a 30 day or 90 day supply? 90  6. How many refills are you requesting? 4  7. What is your preferred pharmacy and location of the pharmacy? [facility] on [address]  8. Who can we contact with further questions? [phone] (home)     Pt is out of her medication...

## 2018-12-19 NOTE — TELEPHONE ENCOUNTER
Pt canceled 11/20/18 f/u appt w/me.  Is she still following w/me? If so, needs to reschedule diabetes/b/p f/u for next month. Thanks.

## 2018-12-27 RX ORDER — GLIPIZIDE 5 MG/1
TABLET ORAL
Qty: 45 TABLET | Refills: 1 | OUTPATIENT
Start: 2018-12-27

## 2018-12-28 ENCOUNTER — LAB VISIT (OUTPATIENT)
Dept: LAB | Facility: HOSPITAL | Age: 78
End: 2018-12-28
Attending: INTERNAL MEDICINE
Payer: MEDICARE

## 2018-12-28 DIAGNOSIS — E08.40 DIABETES MELLITUS DUE TO UNDERLYING CONDITION, CONTROLLED, WITH DIABETIC NEUROPATHY, UNSPECIFIED WHETHER LONG TERM INSULIN USE: ICD-10-CM

## 2018-12-28 LAB
ALBUMIN SERPL BCP-MCNC: 3.3 G/DL
ALP SERPL-CCNC: 112 U/L
ALT SERPL W/O P-5'-P-CCNC: 14 U/L
ANION GAP SERPL CALC-SCNC: 9 MMOL/L
AST SERPL-CCNC: 15 U/L
BASOPHILS # BLD AUTO: 0.03 K/UL
BASOPHILS NFR BLD: 0.4 %
BILIRUB SERPL-MCNC: 0.4 MG/DL
BUN SERPL-MCNC: 13 MG/DL
CALCIUM SERPL-MCNC: 10.2 MG/DL
CHLORIDE SERPL-SCNC: 106 MMOL/L
CO2 SERPL-SCNC: 27 MMOL/L
CREAT SERPL-MCNC: 0.8 MG/DL
DIFFERENTIAL METHOD: ABNORMAL
EOSINOPHIL # BLD AUTO: 0.2 K/UL
EOSINOPHIL NFR BLD: 1.9 %
ERYTHROCYTE [DISTWIDTH] IN BLOOD BY AUTOMATED COUNT: 14.1 %
EST. GFR  (AFRICAN AMERICAN): >60 ML/MIN/1.73 M^2
EST. GFR  (NON AFRICAN AMERICAN): >60 ML/MIN/1.73 M^2
ESTIMATED AVG GLUCOSE: 114 MG/DL
GLUCOSE SERPL-MCNC: 106 MG/DL
HBA1C MFR BLD HPLC: 5.6 %
HCT VFR BLD AUTO: 37.7 %
HGB BLD-MCNC: 12.7 G/DL
LYMPHOCYTES # BLD AUTO: 2.2 K/UL
LYMPHOCYTES NFR BLD: 27.9 %
MCH RBC QN AUTO: 32.5 PG
MCHC RBC AUTO-ENTMCNC: 33.7 G/DL
MCV RBC AUTO: 96 FL
MONOCYTES # BLD AUTO: 0.4 K/UL
MONOCYTES NFR BLD: 4.9 %
NEUTROPHILS # BLD AUTO: 5.1 K/UL
NEUTROPHILS NFR BLD: 64.9 %
PLATELET # BLD AUTO: 177 K/UL
PMV BLD AUTO: 11.2 FL
POTASSIUM SERPL-SCNC: 4 MMOL/L
PROT SERPL-MCNC: 7.3 G/DL
RBC # BLD AUTO: 3.91 M/UL
SODIUM SERPL-SCNC: 142 MMOL/L
WBC # BLD AUTO: 7.91 K/UL

## 2018-12-28 PROCEDURE — 83036 HEMOGLOBIN GLYCOSYLATED A1C: CPT

## 2018-12-28 PROCEDURE — 36415 COLL VENOUS BLD VENIPUNCTURE: CPT | Mod: PO

## 2018-12-28 PROCEDURE — 85025 COMPLETE CBC W/AUTO DIFF WBC: CPT | Mod: PO

## 2018-12-28 PROCEDURE — 80053 COMPREHEN METABOLIC PANEL: CPT | Mod: PO

## 2019-01-02 ENCOUNTER — OFFICE VISIT (OUTPATIENT)
Dept: HEMATOLOGY/ONCOLOGY | Facility: CLINIC | Age: 79
End: 2019-01-02
Payer: MEDICARE

## 2019-01-02 VITALS
HEART RATE: 65 BPM | SYSTOLIC BLOOD PRESSURE: 153 MMHG | TEMPERATURE: 97 F | HEIGHT: 64 IN | WEIGHT: 159.63 LBS | DIASTOLIC BLOOD PRESSURE: 69 MMHG | BODY MASS INDEX: 27.25 KG/M2

## 2019-01-02 DIAGNOSIS — F17.200 TOBACCO USE DISORDER: ICD-10-CM

## 2019-01-02 DIAGNOSIS — I15.2 HYPERTENSION ASSOCIATED WITH DIABETES: ICD-10-CM

## 2019-01-02 DIAGNOSIS — M79.651 RIGHT THIGH PAIN: ICD-10-CM

## 2019-01-02 DIAGNOSIS — E11.59 HYPERTENSION ASSOCIATED WITH DIABETES: ICD-10-CM

## 2019-01-02 DIAGNOSIS — C50.912 MALIGNANT NEOPLASM OF LEFT BREAST IN FEMALE, ESTROGEN RECEPTOR POSITIVE, UNSPECIFIED SITE OF BREAST: Primary | ICD-10-CM

## 2019-01-02 DIAGNOSIS — Z17.0 MALIGNANT NEOPLASM OF LEFT BREAST IN FEMALE, ESTROGEN RECEPTOR POSITIVE, UNSPECIFIED SITE OF BREAST: Primary | ICD-10-CM

## 2019-01-02 PROCEDURE — 1101F PT FALLS ASSESS-DOCD LE1/YR: CPT | Mod: CPTII,S$GLB,, | Performed by: NURSE PRACTITIONER

## 2019-01-02 PROCEDURE — G0008 FLU VACCINE - HIGH DOSE (65+) PRESERVATIVE FREE IM: ICD-10-PCS | Mod: S$GLB,,, | Performed by: NURSE PRACTITIONER

## 2019-01-02 PROCEDURE — 1101F PR PT FALLS ASSESS DOC 0-1 FALLS W/OUT INJ PAST YR: ICD-10-PCS | Mod: CPTII,S$GLB,, | Performed by: NURSE PRACTITIONER

## 2019-01-02 PROCEDURE — 90662 FLU VACCINE - HIGH DOSE (65+) PRESERVATIVE FREE IM: ICD-10-PCS | Mod: S$GLB,,, | Performed by: NURSE PRACTITIONER

## 2019-01-02 PROCEDURE — 3078F DIAST BP <80 MM HG: CPT | Mod: CPTII,S$GLB,, | Performed by: NURSE PRACTITIONER

## 2019-01-02 PROCEDURE — 90662 IIV NO PRSV INCREASED AG IM: CPT | Mod: S$GLB,,, | Performed by: NURSE PRACTITIONER

## 2019-01-02 PROCEDURE — 99999 PR PBB SHADOW E&M-EST. PATIENT-LVL III: CPT | Mod: PBBFAC,,, | Performed by: NURSE PRACTITIONER

## 2019-01-02 PROCEDURE — 99214 PR OFFICE/OUTPT VISIT, EST, LEVL IV, 30-39 MIN: ICD-10-PCS | Mod: 25,S$GLB,, | Performed by: NURSE PRACTITIONER

## 2019-01-02 PROCEDURE — 3077F PR MOST RECENT SYSTOLIC BLOOD PRESSURE >= 140 MM HG: ICD-10-PCS | Mod: CPTII,S$GLB,, | Performed by: NURSE PRACTITIONER

## 2019-01-02 PROCEDURE — 99214 OFFICE O/P EST MOD 30 MIN: CPT | Mod: 25,S$GLB,, | Performed by: NURSE PRACTITIONER

## 2019-01-02 PROCEDURE — 3078F PR MOST RECENT DIASTOLIC BLOOD PRESSURE < 80 MM HG: ICD-10-PCS | Mod: CPTII,S$GLB,, | Performed by: NURSE PRACTITIONER

## 2019-01-02 PROCEDURE — 3077F SYST BP >= 140 MM HG: CPT | Mod: CPTII,S$GLB,, | Performed by: NURSE PRACTITIONER

## 2019-01-02 PROCEDURE — 99999 PR PBB SHADOW E&M-EST. PATIENT-LVL III: ICD-10-PCS | Mod: PBBFAC,,, | Performed by: NURSE PRACTITIONER

## 2019-01-02 PROCEDURE — G0008 ADMIN INFLUENZA VIRUS VAC: HCPCS | Mod: S$GLB,,, | Performed by: NURSE PRACTITIONER

## 2019-01-02 NOTE — PROGRESS NOTES
Subjective:      Patient ID: Danyelle Garcia is a 78 y.o. female.    Chief Complaint: lab discussion    HPI:  Patient is a 78 year old AA femalefor follow up of her previously diagnosed triple negative breast cancer.  on 1/2006 was diagnosed as having cancer of the left breast and underwent  lumpectomy along with sentinel lymph node biopsy. The pathology report from  the [facility] indicated that the   patient had an invasive ductal carcinoma of the breast measuring 1.5 cm in   diameter.     The ER receptors and the HER-2 kris tests were negative.   She received 4 cycles of adjuvant AC which she tolerated well.  She had radiation therapy.  She has stable, chronic Bell's palsy  She also has HBP , diabetes and elevated cholesterol for which she takes medications.    Mammogram done on 8/23/18 negative for malignancy.  She is to follow up in 1 year.  She complains of right sided upper thigh/hip pain for the past 3 months and states that she has seen ortho did an xray and diagnosed her with bursitis.  She states the pain is an achiness that worsens with long standing.   She states that she received an injection and has been taking tylenol and ibuprofen with minimal relief.  She states that she also had dizziness when lying down on her left side and had an MRI of brain which was negative.  Offered CT of right thigh/hip and patient declined at this time.  We also discussed cigarette smoking cessation due to patient being a current daily cigarette smoker.          Social History     Socioeconomic History    Marital status: Single     Spouse name: Not on file    Number of children: 4    Years of education: Not on file    Highest education level: Not on file   Social Needs    Financial resource strain: Not on file    Food insecurity - worry: Not on file    Food insecurity - inability: Not on file    Transportation needs - medical: Not on file    Transportation needs -  non-medical: Not on file   Occupational History    Occupation: Retired   Tobacco Use    Smoking status: Current Some Day Smoker     Packs/day: 0.40     Years: 40.00     Pack years: 16.00     Types: Cigarettes    Smokeless tobacco: Never Used    Tobacco comment: Trying to quit as states rarely smokes   Substance and Sexual Activity    Alcohol use: Yes     Alcohol/week: 3.6 oz     Types: 6 Cans of beer per week     Comment: Occasionally    Drug use: No    Sexual activity: Not Currently   Other Topics Concern    Not on file   Social History Narrative    She wears seatbelt. She lives alone and continues to drive.       Family History   Problem Relation Age of Onset    Hypertension Mother     Heart attack Father     Heart disease Father         MI/CAD    Diabetes Daughter     Diabetes Son     No Known Problems Daughter     No Known Problems Son     Cancer Neg Hx     Stroke Neg Hx        Past Surgical History:   Procedure Laterality Date    BREAST LUMPECTOMY Left     CATARACT EXTRACTION      CHOLECYSTECTOMY      COLONOSCOPY      COLONOSCOPY N/A 8/4/2015    [facility]    ESOPHAGOGASTRODUODENOSCOPY (EGD) N/A 8/4/2015    [facility]    EYE SURGERY Right     cataract    HYSTERECTOMY      left lumpectomy      TOTAL ABDOMINAL HYSTERECTOMY W/ BILATERAL SALPINGOOPHORECTOMY      Due to benign reasons per patient    UPPER GASTROINTESTINAL ENDOSCOPY         Past Medical History:   Diagnosis Date    Breast cancer 2006    left breast treated with lumpectomy, radiation, and chemo    CAD (coronary artery disease)     Colon polyp     Diabetes mellitus, type 2     Diverticular disease     Dizziness     External hemorrhoid     H. pylori infection     Headache     Hyperlipidemia     Hypertension     Insomnia     Osteopenia     Postmenopausal     Tobacco use     Vitamin D deficiency disease        Review of Systems    Constitutional: Negative for chills, fatigue and fever.   HENT: Negative for facial swelling, hearing loss and trouble swallowing.    Respiratory: Negative for chest tightness and shortness of breath.    Cardiovascular: Negative for chest pain and palpitations.   Gastrointestinal: Negative for diarrhea, nausea and vomiting.   Endocrine: Negative for cold intolerance and heat intolerance.   Genitourinary: Negative for dysuria, hematuria and urgency.   Musculoskeletal: Positive for arthralgias and myalgias.   Skin: Negative for rash and wound.   Neurological: Positive for dizziness and light-headedness. Negative for syncope and headaches.   Hematological: Negative for adenopathy. Does not bruise/bleed easily.   Psychiatric/Behavioral: Negative for confusion and decreased concentration.             Medication List           Accurate as of 1/2/19  9:51 AM. If you have any questions, ask your nurse or doctor.               CONTINUE taking these medications    atorvastatin 80 MG tablet  Commonly known as:  LIPITOR  Take 1 tablet (80 mg total) by mouth every evening.     blood glucose control, normal Soln  Commonly known as:  ACCU-CHEK SMARTVIEW CONTRL SOL  1 each by Misc.(Non-Drug; Combo Route) route 2 (two) times daily.     blood sugar diagnostic Strp  1 strip by Misc.(Non-Drug; Combo Route) route 2 (two) times daily.     blood-glucose meter kit  Use as instructed     calcium carbonate 600 mg calcium (1,500 mg) Tab  Commonly known as:  OS-DAMIAN     glipiZIDE 5 MG tablet  Commonly known as:  GLUCOTROL  TAKE 1/2 TABLET BY MOUTH WITH BREAKFAST     lancets 30 gauge Misc  Commonly known as:  TRUEPLUS LANCETS  Inject 1 lancet into the skin 2 (two) times daily.     losartan 50 MG tablet  Commonly known as:  COZAAR  TAKE 1 TABLET(50 MG) BY MOUTH EVERY DAY     meloxicam 15 MG tablet  Commonly known as:  MOBIC     pantoprazole 40 MG tablet  Commonly known as:  PROTONIX  TK 1 T PO  ONCE D             Objective:     Vitals:     01/02/19 0903   BP: (!) 153/69   Pulse: 65   Temp: 97 °F (36.1 °C)       Physical Exam   Constitutional: She is oriented to person, place, and time. She appears well-developed and well-nourished.  Non-toxic appearance. She does not have a sickly appearance. She does not appear ill. No distress.   HENT:   Head: Normocephalic and atraumatic.   Right Ear: External ear normal.   Left Ear: External ear normal.   Nose: Nose normal.   Eyes: Conjunctivae and lids are normal. Right eye exhibits no discharge. Left eye exhibits no discharge. No scleral icterus.   Cardiovascular: Normal rate, regular rhythm, S1 normal, S2 normal and normal heart sounds. Exam reveals no gallop and no friction rub.   No murmur heard.  Pulmonary/Chest: Effort normal and breath sounds normal. No accessory muscle usage or stridor. No apnea, no tachypnea and no bradypnea. No respiratory distress. She has no wheezes. She has no rales.   Abdominal: Soft. Normal appearance. She exhibits no distension.   Musculoskeletal: Normal range of motion. She exhibits no edema.   Neurological: She is alert and oriented to person, place, and time.   Skin: Skin is warm, dry and intact. Capillary refill takes less than 2 seconds. No bruising, no lesion and no rash noted. She is not diaphoretic. No pallor.   Psychiatric: She has a normal mood and affect. Her speech is normal and behavior is normal. Judgment and thought content normal. Cognition and memory are normal. She is attentive.   Vitals reviewed.      Assessment:     Problem List Items Addressed This Visit        Oncology    Malignant neoplasm of left female breast - Primary    Relevant Orders    CBC auto differential    Comprehensive metabolic panel          Plan:   Malignant neoplasm of left breast in female, estrogen receptor positive, unspecified site of breast  -     CBC auto differential; Future; Expected date: 01/02/2019  -     Comprehensive metabolic panel; Future; Expected date: 01/02/2019    Other  orders  -     Influenza - High Dose (65+) (PF) (IM)    Follow up in six months with CBC and CMP.  Patient will  Contact us if she decided she would like to proceed with CT or right hip/upper thighRe evaluate right hip/thigh pain.        I will review assessment/plan with collaborating physician.    Thank You,  MISHA Berry-C

## 2019-01-22 ENCOUNTER — OFFICE VISIT (OUTPATIENT)
Dept: NEUROLOGY | Facility: CLINIC | Age: 79
End: 2019-01-22
Payer: MEDICARE

## 2019-01-22 VITALS
WEIGHT: 162.94 LBS | HEIGHT: 64 IN | HEART RATE: 64 BPM | BODY MASS INDEX: 27.82 KG/M2 | DIASTOLIC BLOOD PRESSURE: 78 MMHG | SYSTOLIC BLOOD PRESSURE: 164 MMHG | RESPIRATION RATE: 20 BRPM

## 2019-01-22 DIAGNOSIS — R41.3 MEMORY DEFICIT: Primary | ICD-10-CM

## 2019-01-22 PROCEDURE — 3077F SYST BP >= 140 MM HG: CPT | Mod: CPTII,S$GLB,, | Performed by: PSYCHIATRY & NEUROLOGY

## 2019-01-22 PROCEDURE — 1101F PR PT FALLS ASSESS DOC 0-1 FALLS W/OUT INJ PAST YR: ICD-10-PCS | Mod: CPTII,S$GLB,, | Performed by: PSYCHIATRY & NEUROLOGY

## 2019-01-22 PROCEDURE — 1101F PT FALLS ASSESS-DOCD LE1/YR: CPT | Mod: CPTII,S$GLB,, | Performed by: PSYCHIATRY & NEUROLOGY

## 2019-01-22 PROCEDURE — 99214 PR OFFICE/OUTPT VISIT, EST, LEVL IV, 30-39 MIN: ICD-10-PCS | Mod: S$GLB,,, | Performed by: PSYCHIATRY & NEUROLOGY

## 2019-01-22 PROCEDURE — 3077F PR MOST RECENT SYSTOLIC BLOOD PRESSURE >= 140 MM HG: ICD-10-PCS | Mod: CPTII,S$GLB,, | Performed by: PSYCHIATRY & NEUROLOGY

## 2019-01-22 PROCEDURE — 3078F DIAST BP <80 MM HG: CPT | Mod: CPTII,S$GLB,, | Performed by: PSYCHIATRY & NEUROLOGY

## 2019-01-22 PROCEDURE — 3078F PR MOST RECENT DIASTOLIC BLOOD PRESSURE < 80 MM HG: ICD-10-PCS | Mod: CPTII,S$GLB,, | Performed by: PSYCHIATRY & NEUROLOGY

## 2019-01-22 PROCEDURE — 99999 PR PBB SHADOW E&M-EST. PATIENT-LVL III: CPT | Mod: PBBFAC,,, | Performed by: PSYCHIATRY & NEUROLOGY

## 2019-01-22 PROCEDURE — 99999 PR PBB SHADOW E&M-EST. PATIENT-LVL III: ICD-10-PCS | Mod: PBBFAC,,, | Performed by: PSYCHIATRY & NEUROLOGY

## 2019-01-22 PROCEDURE — 99214 OFFICE O/P EST MOD 30 MIN: CPT | Mod: S$GLB,,, | Performed by: PSYCHIATRY & NEUROLOGY

## 2019-01-22 NOTE — PROGRESS NOTES
Consult Requested By: No att. providers found  Reason for Consult:   Dizziness    SUBJECTIVE:       HPI:   Dizziness has improved.    Past Medical History:   Diagnosis Date    Breast cancer 2006    left breast treated with lumpectomy, radiation, and chemo    CAD (coronary artery disease)     Colon polyp     Diabetes mellitus, type 2     Diverticular disease     Dizziness     External hemorrhoid     H. pylori infection     Headache     Hyperlipidemia     Hypertension     Insomnia     Osteopenia     Postmenopausal     Tobacco use     Vitamin D deficiency disease      Past Surgical History:   Procedure Laterality Date    BREAST LUMPECTOMY Left     CATARACT EXTRACTION      CHOLECYSTECTOMY      COLONOSCOPY      COLONOSCOPY N/A 8/4/2015    [facility]    ESOPHAGOGASTRODUODENOSCOPY (EGD) N/A 8/4/2015    [facility]    EYE SURGERY Right     cataract    HYSTERECTOMY      left lumpectomy      TOTAL ABDOMINAL HYSTERECTOMY W/ BILATERAL SALPINGOOPHORECTOMY      Due to benign reasons per patient    UPPER GASTROINTESTINAL ENDOSCOPY       Family History   Problem Relation Age of Onset    Hypertension Mother     Heart attack Father     Heart disease Father         MI/CAD    Diabetes Daughter     Diabetes Son     No Known Problems Daughter     No Known Problems Son     Cancer Neg Hx     Stroke Neg Hx      Social History     Tobacco Use    Smoking status: Current Some Day Smoker     Packs/day: 0.40     Years: 40.00     Pack years: 16.00     Types: Cigarettes    Smokeless tobacco: Never Used    Tobacco comment: Trying to quit as states rarely smokes   Substance Use Topics    Alcohol use: Yes     Alcohol/week: 3.6 oz     Types: 6 Cans of beer per week     Comment: Occasionally    Drug use: No     Review of patient's allergies indicates:  No Known Allergies    Review of Systems   Constitutional: Negative for fever and weight  loss.   HENT: Negative for ear pain, hearing loss and tinnitus.    Eyes: Negative for blurred vision, double vision, photophobia, pain, discharge and redness.   Respiratory: Negative for cough and shortness of breath.    Cardiovascular: Negative for chest pain, palpitations, claudication and leg swelling.   Gastrointestinal: Negative for abdominal pain, heartburn, nausea and vomiting.   Genitourinary: Negative for dysuria, flank pain, frequency and urgency.   Musculoskeletal: Negative.  Negative for back pain, falls, joint pain, myalgias and neck pain.   Skin: Negative for itching and rash.   Neurological: Negative for dizziness, tingling, tremors, sensory change, speech change, focal weakness, seizures, loss of consciousness, weakness and headaches.   Endo/Heme/Allergies: Does not bruise/bleed easily.   Psychiatric/Behavioral: Negative for depression, hallucinations, memory loss and suicidal ideas. The patient is not nervous/anxious and does not have insomnia.          OBJECTIVE:     Vital Signs (Most Recent)  Pulse: 64 (01/22/19 0913)  Resp: 20 (01/22/19 0913)  BP: (!) 164/78 (01/22/19 0913)    Physical Exam   Constitutional: She is oriented to person, place, and time. She appears well-developed and well-nourished.   HENT:   Head: Normocephalic and atraumatic.   Eyes: Conjunctivae and EOM are normal. Pupils are equal, round, and reactive to light.   Neck: Normal range of motion. Neck supple. No JVD present. No tracheal deviation present. No thyromegaly present.   Cardiovascular: Normal rate, regular rhythm and normal heart sounds.   Pulmonary/Chest: Effort normal and breath sounds normal.   Abdominal: She exhibits no distension. There is no tenderness.   Musculoskeletal: Normal range of motion. She exhibits no edema or tenderness.   Neurological: She is alert and oriented to person, place, and time. She has normal strength and normal reflexes. She displays normal reflexes. No cranial nerve deficit or sensory  deficit. She exhibits normal muscle tone. She displays a negative Romberg sign. Coordination and gait normal.   Reflex Scores:       Tricep reflexes are 2+ on the right side and 2+ on the left side.       Bicep reflexes are 2+ on the right side and 2+ on the left side.       Brachioradialis reflexes are 2+ on the right side and 2+ on the left side.       Patellar reflexes are 2+ on the right side and 2+ on the left side.       Achilles reflexes are 2+ on the right side and 2+ on the left side.  Skin: Skin is warm and dry. No rash noted.   Psychiatric: She has a normal mood and affect. Her behavior is normal. Judgment and thought content normal.       Strength  Deltoids Triceps Biceps Wrist Extension Wrist Flexion Hand    Upper: R 5/5 5/5 5/5 5/5 5/5 5/5    L 5/5 5/5 5/5 5/5 5/5 5/5     Iliopsoas Quadriceps Knee  Flexion Tibialis  anterior Gastro- cnemius EHL   Lower: R 5/5 5/5 5/5 5/5 5/5 5/5    L 5/5 5/5 5/5 5/5 5/5 5/5     Laboratory:  Lab Results   Component Value Date    WBC 7.91 12/28/2018    HGB 12.7 12/28/2018    HCT 37.7 12/28/2018     12/28/2018    CHOL 218 (H) 03/16/2018    TRIG 107 03/16/2018    HDL 67 03/16/2018    ALT 14 12/28/2018    AST 15 12/28/2018     12/28/2018    K 4.0 12/28/2018     12/28/2018    CREATININE 0.8 12/28/2018    BUN 13 12/28/2018    CO2 27 12/28/2018    TSH 1.127 03/16/2018    HGBA1C 5.6 12/28/2018             ASSESSMENT/PLAN:     Primary Diagnoses:  . Cerebral Aneurysm, not seen by CTA of brain.  2.Carotid arterial disease. Mild.  3. Memeory loss : may be age related.  4. Dizziness: improved.       Patient Active Problem List   Diagnosis    Type II or unspecified type diabetes mellitus without mention of complication, not stated as uncontrolled    Tobacco use disorder    Disorder of bone and cartilage    Asymptomatic postmenopausal status    CAD (coronary artery disease)    Vitamin D deficiency    PAD (peripheral artery disease)    Right shoulder pain     Hypertension associated with diabetes    Combined hyperlipidemia associated with type 2 diabetes mellitus    Epigastric pain    History of left breast cancer    Gastroesophageal reflux disease    Diabetes mellitus with neurological manifestations, controlled    Type 2 diabetes mellitus with peripheral artery disease    Carotid artery disease    Malignant neoplasm of left female breast    Memory deficit    Type 2 diabetes mellitus without retinopathy    Serum calcium elevated    Right thigh pain        Plan: will see next year.  Time spent: 30 minutes in face to face discussion concerning diagnosis, prognosis, review of lab and test results, benefits of treatment as well as management of disease, counseling of patient and coordination of care between various health care providers . Greater than half the time spent was used for coordination of care and counseling of patient. This note may have some spelling or wording mistakes which might have been overlooked during proof reading.

## 2019-02-04 ENCOUNTER — OFFICE VISIT (OUTPATIENT)
Dept: FAMILY MEDICINE | Facility: CLINIC | Age: 79
End: 2019-02-04
Payer: MEDICARE

## 2019-02-04 VITALS
HEIGHT: 64 IN | SYSTOLIC BLOOD PRESSURE: 130 MMHG | BODY MASS INDEX: 26.91 KG/M2 | DIASTOLIC BLOOD PRESSURE: 60 MMHG | TEMPERATURE: 97 F | OXYGEN SATURATION: 96 % | WEIGHT: 157.63 LBS | HEART RATE: 78 BPM

## 2019-02-04 DIAGNOSIS — I73.9 PAD (PERIPHERAL ARTERY DISEASE): ICD-10-CM

## 2019-02-04 DIAGNOSIS — E11.69 COMBINED HYPERLIPIDEMIA ASSOCIATED WITH TYPE 2 DIABETES MELLITUS: ICD-10-CM

## 2019-02-04 DIAGNOSIS — E55.9 VITAMIN D DEFICIENCY: ICD-10-CM

## 2019-02-04 DIAGNOSIS — E78.2 COMBINED HYPERLIPIDEMIA ASSOCIATED WITH TYPE 2 DIABETES MELLITUS: ICD-10-CM

## 2019-02-04 DIAGNOSIS — E08.40 DIABETES MELLITUS DUE TO UNDERLYING CONDITION, CONTROLLED, WITH DIABETIC NEUROPATHY, UNSPECIFIED WHETHER LONG TERM INSULIN USE: ICD-10-CM

## 2019-02-04 DIAGNOSIS — E11.51 TYPE 2 DIABETES MELLITUS WITH PERIPHERAL ARTERY DISEASE: ICD-10-CM

## 2019-02-04 DIAGNOSIS — E11.59 HYPERTENSION ASSOCIATED WITH DIABETES: ICD-10-CM

## 2019-02-04 DIAGNOSIS — I25.10 CORONARY ARTERY DISEASE INVOLVING NATIVE CORONARY ARTERY OF NATIVE HEART WITHOUT ANGINA PECTORIS: Chronic | ICD-10-CM

## 2019-02-04 DIAGNOSIS — Z17.0 MALIGNANT NEOPLASM OF LEFT BREAST IN FEMALE, ESTROGEN RECEPTOR POSITIVE, UNSPECIFIED SITE OF BREAST: ICD-10-CM

## 2019-02-04 DIAGNOSIS — I77.9 CAROTID ARTERY DISEASE, UNSPECIFIED LATERALITY, UNSPECIFIED TYPE: ICD-10-CM

## 2019-02-04 DIAGNOSIS — F17.200 TOBACCO USE DISORDER: ICD-10-CM

## 2019-02-04 DIAGNOSIS — I15.2 HYPERTENSION ASSOCIATED WITH DIABETES: ICD-10-CM

## 2019-02-04 DIAGNOSIS — R41.3 MEMORY DEFICIT: ICD-10-CM

## 2019-02-04 DIAGNOSIS — C50.912 MALIGNANT NEOPLASM OF LEFT BREAST IN FEMALE, ESTROGEN RECEPTOR POSITIVE, UNSPECIFIED SITE OF BREAST: ICD-10-CM

## 2019-02-04 PROCEDURE — 1101F PT FALLS ASSESS-DOCD LE1/YR: CPT | Mod: CPTII,S$GLB,, | Performed by: FAMILY MEDICINE

## 2019-02-04 PROCEDURE — 3075F PR MOST RECENT SYSTOLIC BLOOD PRESS GE 130-139MM HG: ICD-10-PCS | Mod: CPTII,S$GLB,, | Performed by: FAMILY MEDICINE

## 2019-02-04 PROCEDURE — 99999 PR PBB SHADOW E&M-EST. PATIENT-LVL III: ICD-10-PCS | Mod: PBBFAC,,, | Performed by: FAMILY MEDICINE

## 2019-02-04 PROCEDURE — 3075F SYST BP GE 130 - 139MM HG: CPT | Mod: CPTII,S$GLB,, | Performed by: FAMILY MEDICINE

## 2019-02-04 PROCEDURE — 1101F PR PT FALLS ASSESS DOC 0-1 FALLS W/OUT INJ PAST YR: ICD-10-PCS | Mod: CPTII,S$GLB,, | Performed by: FAMILY MEDICINE

## 2019-02-04 PROCEDURE — 3078F PR MOST RECENT DIASTOLIC BLOOD PRESSURE < 80 MM HG: ICD-10-PCS | Mod: CPTII,S$GLB,, | Performed by: FAMILY MEDICINE

## 2019-02-04 PROCEDURE — 99999 PR PBB SHADOW E&M-EST. PATIENT-LVL III: CPT | Mod: PBBFAC,,, | Performed by: FAMILY MEDICINE

## 2019-02-04 PROCEDURE — 3078F DIAST BP <80 MM HG: CPT | Mod: CPTII,S$GLB,, | Performed by: FAMILY MEDICINE

## 2019-02-04 PROCEDURE — 99214 OFFICE O/P EST MOD 30 MIN: CPT | Mod: S$GLB,,, | Performed by: FAMILY MEDICINE

## 2019-02-04 PROCEDURE — 99214 PR OFFICE/OUTPT VISIT, EST, LEVL IV, 30-39 MIN: ICD-10-PCS | Mod: S$GLB,,, | Performed by: FAMILY MEDICINE

## 2019-02-04 RX ORDER — GLIPIZIDE 5 MG/1
TABLET ORAL
Qty: 45 TABLET | Refills: 1 | Status: SHIPPED | OUTPATIENT
Start: 2019-02-04 | End: 2019-06-25 | Stop reason: SDUPTHER

## 2019-02-04 RX ORDER — TRAMADOL HYDROCHLORIDE 50 MG/1
TABLET ORAL
Refills: 0 | COMMUNITY
Start: 2019-01-29 | End: 2019-05-07

## 2019-02-04 NOTE — PROGRESS NOTES
Danyelle Garcia    Chief Complaint   Patient presents with    Hypertension    Diabetes    Follow-up       History of Present Illness:   Ms. Garcia comes in today for 3-month diabetes and hypertension follow-up.  She is not fasting but has taken medication today.  She states she monitors her diet sometimes.  She states her exercise is limited due to right knee hip pain but states she occasionally walks.  She rarely performs home glucose checks with levels ranging 120-130's.  The she states she continues to smoke and is not trying to quit.    She saw Dr. Darío Starks, orthopedist, on January 29, 2019 for right hip pain.  She states she was prescribed tramadol 50 mg every 6-8 hours prn pain but also states she will start physical therapy soon.    She saw Dr. Mason, the neurologist, on January 27, 2018 for memory deficit.  She saw YUNG Foley with Hematology/Oncology on January 2, 2019 for surveillance of left breast cancer with 1-year follow-up advised.  She saw Dr. Fredi Garces, cardiologist, on January 16, 2019 for surveillance of nonobstructive CAD by heart catheterization performed in March 2018, left bundle branch block, moderate carotid disease (40-59% bilaterally noted in January 2018), hypertension, hyperlipidemia, type 2 diabetes mellitus, history of breast cancer and advised to continue statin and Cozaar with 6-month follow-up advised.    Otherwise, she states she feels okay today.  She denies having fever, chills, fatigue, appetite changes; shortness of breath, cough, wheezing; chest pain, palpitations, leg swelling; abdominal pain, nausea, vomiting, diarrhea, constipation; unusual urinary symptoms; polydipsia, polyuria, polyphagia; back pain today; anxiety, depression, homicidal or suicidal thoughts.      Labs:                      WBC                      7.91                12/28/2018                 HGB                      12.7                12/28/2018                 HCT                       37.7                12/28/2018                 PLT                      177                 12/28/2018                 CHOL                     218 (H)             03/16/2018                 TRIG                     107                 03/16/2018                 HDL                      67                  03/16/2018                 ALT                      14                  12/28/2018                 AST                      15                  12/28/2018                 NA                       142                 12/28/2018                 K                        4.0                 12/28/2018                 CL                       106                 12/28/2018                 CREATININE               0.8                 12/28/2018                 BUN                      13                  12/28/2018                 CO2                      27                  12/28/2018                 TSH                      1.127               03/16/2018                 HGBA1C                   5.6                 12/28/2018             LDLCALC                  129.6               03/16/2018                  Current Outpatient Medications   Medication Sig    atorvastatin (LIPITOR) 80 MG tablet Take 1 tablet (80 mg total) by mouth every evening.    blood glucose control, normal (ACCU-CHEK SMARTVIEW CONTRL SOL) Soln 1 each by Misc.(Non-Drug; Combo Route) route 2 (two) times daily.    blood sugar diagnostic Strp 1 strip by Misc.(Non-Drug; Combo Route) route 2 (two) times daily.    blood-glucose meter kit Use as instructed    calcium carbonate (OS-DAMIAN) 600 mg (1,500 mg) Tab Take 600 mg by mouth 2 (two) times daily with meals.    glipiZIDE (GLUCOTROL) 5 MG tablet TAKE 1/2 TABLET BY MOUTH WITH BREAKFAST    lancets (TRUEPLUS LANCETS) 30 gauge Misc Inject 1 lancet into the skin 2 (two) times daily.    losartan (COZAAR) 50 MG tablet TAKE 1 TABLET(50 MG) BY MOUTH EVERY DAY    pantoprazole (PROTONIX) 40 MG tablet  TK 1 T PO  ONCE D    traMADol (ULTRAM) 50 mg tablet TAKE ONE OR TWO TABLETS PO Q 6 TO 8 H PRN P     Review of Systems   Constitutional: Negative for activity change, appetite change, chills, fatigue and fever.        Weight  Wt 72.4 kg (159 lb 11.6 oz) at August 13, 2018 visit.   Eyes:        See history of present illness.   Respiratory: Negative for cough, shortness of breath and wheezing.    Cardiovascular: Negative for chest pain, palpitations and leg swelling.        See history of present illness.   Gastrointestinal: Negative for abdominal pain, constipation, diarrhea, nausea and vomiting.   Endocrine: Negative for polydipsia, polyphagia and polyuria.        See history of present illness.   Genitourinary: Negative for difficulty urinating.   Musculoskeletal: Positive for arthralgias. Negative for back pain.   Neurological: Negative for headaches.   Hematological:        See history of present illness.   Psychiatric/Behavioral: Negative for dysphoric mood and suicidal ideas. The patient is not nervous/anxious.         See history of present illness. Negative for homicidal ideas.       Objective:  Physical Exam   Constitutional: She is oriented to person, place, and time. She appears well-developed and well-nourished. No distress.   Elderly and pleasant.   Neck: Normal range of motion. Neck supple. No thyromegaly present.   Cardiovascular: Normal rate and regular rhythm.   Pulses:       Dorsalis pedis pulses are 1+ on the right side, and 1+ on the left side.        Posterior tibial pulses are 1+ on the right side, and 1+ on the left side.   Chronic decreased pedal pulses.   Pulmonary/Chest: Effort normal and breath sounds normal. No respiratory distress. She has no wheezes. Left breast exhibits no inverted nipple, no mass, no nipple discharge, no skin change and no tenderness. There is no breast swelling.   Abdominal: Soft. Bowel sounds are normal. She exhibits no distension and no mass. There is no  tenderness. There is no rebound and no guarding.   Genitourinary: No breast tenderness or discharge.   Musculoskeletal: Normal range of motion. She exhibits no edema or tenderness.   She is ambulatory without problems. Feet look okay without ulcerations or skin breaks. Nontender hips with full range of motion noted.   Feet:   Right Foot:   Protective Sensation: 5 sites tested. 5 sites sensed.   Skin Integrity: Negative for ulcer or skin breakdown.   Left Foot:   Protective Sensation: 5 sites tested. 5 sites sensed.   Skin Integrity: Negative for ulcer or skin breakdown.   Lymphadenopathy:     She has no cervical adenopathy.     She has no axillary adenopathy.   Neurological: She is alert and oriented to person, place, and time.   Skin: No rash noted. She is not diaphoretic. No erythema.   Flesh-colored skin tag at left neck noted.   Psychiatric: She has a normal mood and affect. Her behavior is normal. Judgment and thought content normal.   Vitals reviewed.      ASSESSMENT:  1. Hypertension associated with diabetes    2. Diabetes mellitus due to underlying condition, controlled, with diabetic neuropathy, unspecified whether long term insulin use    3. Type 2 diabetes mellitus with peripheral artery disease    4. PAD (peripheral artery disease)    5. Combined hyperlipidemia associated with type 2 diabetes mellitus    6. Vitamin D deficiency    7. Carotid artery disease, unspecified laterality, unspecified type    8. Coronary artery disease involving native coronary artery of native heart without angina pectoris    9. Memory deficit    10. Malignant neoplasm of left breast in female, estrogen receptor positive, unspecified site of breast    11. Tobacco use disorder        PLAN:  Danyelle was seen today for hypertension, diabetes and follow-up.    Diagnoses and all orders for this visit:    Hypertension associated with diabetes    Diabetes mellitus due to underlying condition, controlled, with diabetic neuropathy,  unspecified whether long term insulin use    Type 2 diabetes mellitus with peripheral artery disease  -     glipiZIDE (GLUCOTROL) 5 MG tablet; TAKE 1/2 TABLET BY MOUTH WITH BREAKFAST    PAD (peripheral artery disease)    Combined hyperlipidemia associated with type 2 diabetes mellitus    Vitamin D deficiency    Carotid artery disease, unspecified laterality, unspecified type    Coronary artery disease involving native coronary artery of native heart without angina pectoris    Memory deficit    Malignant neoplasm of left breast in female, estrogen receptor positive, unspecified site of breast    Tobacco use disorder       Continue current medications, follow low sodium, low cholesterol, low carb diet, daily walks.  Keep follow up with specialists.  Prescription refill as noted above.  Smoking cessation advised.  Follow-up in about 3 months (around 4/23/2019) for physical with skin tag removal.

## 2019-03-05 ENCOUNTER — TELEPHONE (OUTPATIENT)
Dept: FAMILY MEDICINE | Facility: CLINIC | Age: 79
End: 2019-03-05

## 2019-03-05 NOTE — TELEPHONE ENCOUNTER
Pt notified that she needs to come in to be evaluated and stated that she will just buy something over the counter

## 2019-03-05 NOTE — TELEPHONE ENCOUNTER
----- Message from Rajesh Weir sent at 3/5/2019 12:16 PM CST -----  Contact: self  Type:  Needs Medical Advice    Who Called: pt  Symptoms (please be specific): cough, congestion   How long has patient had these symptoms:  24 hours  Pharmacy name and phone #:    [id number] - [place] - [address] AT [facility] [address]  [address] [address]  Phone: [phone] Fax: [phone]  Would the patient rather a call back or a response via [facility]? Call back  Best Call Back Number: [phone]  Additional Information: pt requesting getting a rx for her cold.       Thanks,  Rajesh Weir

## 2019-03-24 RX ORDER — GLIPIZIDE 5 MG/1
TABLET ORAL
Qty: 45 TABLET | Refills: 0 | Status: SHIPPED | OUTPATIENT
Start: 2019-03-24 | End: 2019-05-07

## 2019-04-23 ENCOUNTER — PATIENT OUTREACH (OUTPATIENT)
Dept: ADMINISTRATIVE | Facility: HOSPITAL | Age: 79
End: 2019-04-23

## 2019-05-07 ENCOUNTER — LAB VISIT (OUTPATIENT)
Dept: LAB | Facility: HOSPITAL | Age: 79
End: 2019-05-07
Attending: FAMILY MEDICINE
Payer: MEDICARE

## 2019-05-07 ENCOUNTER — OFFICE VISIT (OUTPATIENT)
Dept: FAMILY MEDICINE | Facility: CLINIC | Age: 79
End: 2019-05-07
Payer: MEDICARE

## 2019-05-07 VITALS
SYSTOLIC BLOOD PRESSURE: 120 MMHG | BODY MASS INDEX: 26.34 KG/M2 | DIASTOLIC BLOOD PRESSURE: 70 MMHG | WEIGHT: 154.31 LBS | HEART RATE: 61 BPM | TEMPERATURE: 98 F | HEIGHT: 64 IN | OXYGEN SATURATION: 97 %

## 2019-05-07 DIAGNOSIS — Z78.0 POSTMENOPAUSAL: ICD-10-CM

## 2019-05-07 DIAGNOSIS — Z00.00 ANNUAL PHYSICAL EXAM: ICD-10-CM

## 2019-05-07 DIAGNOSIS — I25.10 CORONARY ARTERY DISEASE INVOLVING NATIVE CORONARY ARTERY OF NATIVE HEART WITHOUT ANGINA PECTORIS: Chronic | ICD-10-CM

## 2019-05-07 DIAGNOSIS — K63.5 BENIGN COLON POLYP: ICD-10-CM

## 2019-05-07 DIAGNOSIS — E11.59 HYPERTENSION ASSOCIATED WITH DIABETES: ICD-10-CM

## 2019-05-07 DIAGNOSIS — C50.912 MALIGNANT NEOPLASM OF LEFT BREAST IN FEMALE, ESTROGEN RECEPTOR POSITIVE, UNSPECIFIED SITE OF BREAST: ICD-10-CM

## 2019-05-07 DIAGNOSIS — R41.3 MEMORY DEFICIT: ICD-10-CM

## 2019-05-07 DIAGNOSIS — I73.9 PAD (PERIPHERAL ARTERY DISEASE): ICD-10-CM

## 2019-05-07 DIAGNOSIS — I77.9 CAROTID ARTERY DISEASE, UNSPECIFIED LATERALITY, UNSPECIFIED TYPE: ICD-10-CM

## 2019-05-07 DIAGNOSIS — E11.51 TYPE 2 DIABETES MELLITUS WITH PERIPHERAL ARTERY DISEASE: ICD-10-CM

## 2019-05-07 DIAGNOSIS — M89.9 DISORDER OF BONE AND CARTILAGE: ICD-10-CM

## 2019-05-07 DIAGNOSIS — E78.2 COMBINED HYPERLIPIDEMIA ASSOCIATED WITH TYPE 2 DIABETES MELLITUS: ICD-10-CM

## 2019-05-07 DIAGNOSIS — E55.9 VITAMIN D DEFICIENCY: ICD-10-CM

## 2019-05-07 DIAGNOSIS — I15.2 HYPERTENSION ASSOCIATED WITH DIABETES: ICD-10-CM

## 2019-05-07 DIAGNOSIS — F17.200 TOBACCO USE DISORDER: ICD-10-CM

## 2019-05-07 DIAGNOSIS — E11.69 COMBINED HYPERLIPIDEMIA ASSOCIATED WITH TYPE 2 DIABETES MELLITUS: ICD-10-CM

## 2019-05-07 DIAGNOSIS — Z17.0 MALIGNANT NEOPLASM OF LEFT BREAST IN FEMALE, ESTROGEN RECEPTOR POSITIVE, UNSPECIFIED SITE OF BREAST: ICD-10-CM

## 2019-05-07 DIAGNOSIS — E08.40 DIABETES MELLITUS DUE TO UNDERLYING CONDITION, CONTROLLED, WITH DIABETIC NEUROPATHY, UNSPECIFIED WHETHER LONG TERM INSULIN USE: ICD-10-CM

## 2019-05-07 DIAGNOSIS — K21.9 GASTROESOPHAGEAL REFLUX DISEASE, ESOPHAGITIS PRESENCE NOT SPECIFIED: ICD-10-CM

## 2019-05-07 DIAGNOSIS — M94.9 DISORDER OF BONE AND CARTILAGE: ICD-10-CM

## 2019-05-07 LAB
25(OH)D3+25(OH)D2 SERPL-MCNC: 27 NG/ML (ref 30–96)
CHOLEST SERPL-MCNC: 238 MG/DL (ref 120–199)
CHOLEST/HDLC SERPL: 3.1 {RATIO} (ref 2–5)
ESTIMATED AVG GLUCOSE: 114 MG/DL (ref 68–131)
HBA1C MFR BLD HPLC: 5.6 % (ref 4–5.6)
HDLC SERPL-MCNC: 77 MG/DL (ref 40–75)
HDLC SERPL: 32.4 % (ref 20–50)
LDLC SERPL CALC-MCNC: 147.2 MG/DL (ref 63–159)
NONHDLC SERPL-MCNC: 161 MG/DL
TRIGL SERPL-MCNC: 69 MG/DL (ref 30–150)
TSH SERPL DL<=0.005 MIU/L-ACNC: 1.33 UIU/ML (ref 0.4–4)

## 2019-05-07 PROCEDURE — 84156 ASSAY OF PROTEIN URINE: CPT

## 2019-05-07 PROCEDURE — 84443 ASSAY THYROID STIM HORMONE: CPT

## 2019-05-07 PROCEDURE — 3074F SYST BP LT 130 MM HG: CPT | Mod: CPTII,S$GLB,, | Performed by: FAMILY MEDICINE

## 2019-05-07 PROCEDURE — 82306 VITAMIN D 25 HYDROXY: CPT

## 2019-05-07 PROCEDURE — 36415 COLL VENOUS BLD VENIPUNCTURE: CPT | Mod: PO

## 2019-05-07 PROCEDURE — 3074F PR MOST RECENT SYSTOLIC BLOOD PRESSURE < 130 MM HG: ICD-10-PCS | Mod: CPTII,S$GLB,, | Performed by: FAMILY MEDICINE

## 2019-05-07 PROCEDURE — 99406 BEHAV CHNG SMOKING 3-10 MIN: CPT | Mod: S$GLB,,, | Performed by: FAMILY MEDICINE

## 2019-05-07 PROCEDURE — 83036 HEMOGLOBIN GLYCOSYLATED A1C: CPT

## 2019-05-07 PROCEDURE — 99397 PER PM REEVAL EST PAT 65+ YR: CPT | Mod: 25,S$GLB,, | Performed by: FAMILY MEDICINE

## 2019-05-07 PROCEDURE — 99397 PR PREVENTIVE VISIT,EST,65 & OVER: ICD-10-PCS | Mod: 25,S$GLB,, | Performed by: FAMILY MEDICINE

## 2019-05-07 PROCEDURE — 99406 PR TOBACCO USE CESSATION INTERMEDIATE 3-10 MINUTES: ICD-10-PCS | Mod: S$GLB,,, | Performed by: FAMILY MEDICINE

## 2019-05-07 PROCEDURE — 3078F PR MOST RECENT DIASTOLIC BLOOD PRESSURE < 80 MM HG: ICD-10-PCS | Mod: CPTII,S$GLB,, | Performed by: FAMILY MEDICINE

## 2019-05-07 PROCEDURE — 3078F DIAST BP <80 MM HG: CPT | Mod: CPTII,S$GLB,, | Performed by: FAMILY MEDICINE

## 2019-05-07 PROCEDURE — 99999 PR PBB SHADOW E&M-EST. PATIENT-LVL IV: ICD-10-PCS | Mod: PBBFAC,,, | Performed by: FAMILY MEDICINE

## 2019-05-07 PROCEDURE — 80061 LIPID PANEL: CPT

## 2019-05-07 PROCEDURE — 99999 PR PBB SHADOW E&M-EST. PATIENT-LVL IV: CPT | Mod: PBBFAC,,, | Performed by: FAMILY MEDICINE

## 2019-05-07 RX ORDER — ASPIRIN 81 MG/1
81 TABLET ORAL DAILY PRN
COMMUNITY

## 2019-05-07 NOTE — PROGRESS NOTES
HISTORY OF PRESENT ILLNESS: Ms. Garcia comes in today non fasting and without taking medications for annual wellness exam.      END OF LIFE DECISION: She has a living will and does not desire life support.    She performs random glucose checks with levels ranging 130's.    She continues to follow with hematologist/oncologist Dr. Finley for breast cancer surveillance with breast exam and mammogram with last visit on July 12, 2018 with 6-month follow up with CBC, CMP advised and scheduled for January 2, 2019 with YUNG Foley, whom she saw with 6-month follow up with CBC, CMP advised and scheduled for July 2, 2019 and with 1-year follow up advised.     She also follows with Dr. Loomis, vascular surgeon for carotid artery disease surveillance, occasionally and reports has not seen him in some time.  But, she saw Dr. Fredi Garces, cardiologist, on February 2, 2018 for chest pain with nonobstructive CAD, moderate carotid artery disease, left bundle branch block, hypertension surveillance.     She saw Dr. Mason, neurologist, on January 22, 2019 for memory deficit surveillance.            She states she no longer follows with Dr. Ortiz, pain management specialist, for right leg pain but states she saw Dr. Darío Starks, orthopedist, on January 29, 2019 for right hip pain.  She states she completed 6-weeks of physical therapy last week for   right leg pain.     She continues to smoke but sporadically and is not interested in [facility] smoking cessation program again as she states it was not helpful. However, she states she will be getting with a friend who works at [employer] to help her stop smoking.     Current Outpatient Medications   Medication Sig    aspirin (ECOTRIN) 81 MG EC tablet Take 81 mg by mouth daily as needed for Pain.    atorvastatin (LIPITOR) 80 MG tablet Take 1 tablet (80 mg total) by mouth every evening.    blood glucose control, normal (ACCU-CHEK SMARTVIEW CONTRL SOL) Soln 1 each  by Misc.(Non-Drug; Combo Route) route 2 (two) times daily.    blood sugar diagnostic Strp 1 strip by Misc.(Non-Drug; Combo Route) route 2 (two) times daily.    blood-glucose meter kit Use as instructed    calcium carbonate (OS-DAMIAN) 600 mg (1,500 mg) Tab Take 600 mg by mouth 2 (two) times daily with meals.    glipiZIDE (GLUCOTROL) 5 MG tablet TAKE 1/2 TABLET BY MOUTH WITH BREAKFAST    lancets (TRUEPLUS LANCETS) 30 gauge Misc Inject 1 lancet into the skin 2 (two) times daily.    losartan (COZAAR) 50 MG tablet TAKE 1 TABLET(50 MG) BY MOUTH EVERY DAY    pantoprazole (PROTONIX) 40 MG tablet TK 1 T PO  ONCE D     SCREENINGS:   Cholesterol: March 16, 2018.  FFS/Colonoscopy: August 4, 2015 - benign colon polyp, diverticulosis; repeat in 3 years. She declines colonoscopy or Fit-Kit despite my discussion with her regarding importance.  Mammogram (with Dr. Finley): April 23, 2018 - okay.   GYN Exam (Breast exam) with Dr. Finley: July 12, 2018.   Dexa Scan: March 16, 2018 - osteopenia.   Eye Exam: July 19, 2017 with Dr. Cruz. She declines.  Dental Exam: Years ago per patient. She wears top dentures only. She declines.  PPD: Negative in the past.  Immunizations: Td/Tdap - less than 10 years ago per patient.  Gardasil - N./A.  Zostavax - June 14, 2012.  Pneumovax - March 26, 2014.     Prevnar-13 shot - March 29, 2016.    Seasonal Flu - January 2, 2019.    ROS:  GENERAL: Denies fever, chills, fatigue or unusual weight change. Appetite good. Weight 772.4 kg (159 lb 11.6 oz) at August 13, 2018 visit. Reports walks very little and reports last week completed 6-week of physical therapy (2 times per week for right leg pain). Some times monitors diet.    SKIN: Denies rashes, itching, changes in mole, color or texture of skin or easy bruising.  HEAD: Denies headaches or recent head trauma.   EYES: Denies change in vision, pain, diplopia, redness or discharge. Wears glasses.  EARS: Denies ear pain, discharge, vertigo or  "decreased hearing.   NOSE: Denies loss of smell, epistaxis or rhinitis.  MOUTH & THROAT: Denies hoarseness or change in voice. Denies excessive gum bleeding or mouth sores. Denies sore throat.  NODES: Denies swollen glands.  CHEST: Denies BRITO, wheezing, cough, or sputum production.  CARDIOVASCULAR: Denies PND, chest pain, orthopnea or reduced exercise tolerance. Denies palpitations. See history of present illness.  ABDOMEN: Denies constipation, diarrhea, nausea, vomiting, abdominal pain, or blood in stool.  URINARY: Denies flank pain, dysuria or hematuria.  GENITOURINARY: Denies flank pain, dysuria, frequency or hematuria. Performs monthly breast self exams. See history of present illness.  ENDOCRINE: Denies thyroid problems. See history of present illness.  HEME/LYMPH: Denies bleeding problems.  PERIPHERAL VASCULAR:Denies claudication or cyanosis.  MUSCULOSKELETAL: Denies pain, stiffness, edema except as noted above. See history of present illness.   NEUROLOGIC: Denies history of seizures, tremors, paralysis, alteration of gait or coordination. She reports having stable, chronic memory issue. See history of present illness.  PSYCHIATRIC: Denies mood swings, depression, anxiety, homicidal or suicidal thoughts. Denies sleep problems.    PE:   VS: /70 Comment: Rechecked by Dr. Chan.  Pulse 61   Temp 97.9 °F (36.6 °C) (Tympanic)   Ht 5' 4" (1.626 m)   Wt 70 kg (154 lb 5.2 oz)   SpO2 97%   BMI 26.49 kg/m²   APPEARANCE: Well nourished, well developed female, overweight, elderly and pleasant, alert and oriented in no acute distress.   HEAD: Nontender. Full range of motion.  EYES: PERRL, conjunctiva pink, lids no edema. She wears glasses.   EARS: External canal patent, no swelling or redness. TM's shiny and clear.  NOSE: Mucosa and turbinates pink, not swollen. No discharge. Nontender sinuses.  THROAT: No pharyngeal erythema or exudate. No stridor. She has top dentures.   NECK: Supple, no mass, thyroid not " enlarged.  NODES: No cervical lymph node enlargement.  CHEST: Normal respiratory effort. Lungs clear to auscultation.  CARDIOVASCULAR: Bradycardia today with normal S1, S2. No rubs, murmurs or gallops. PMI not displaced. Left carotid bruit not heard today. Feet look okay without ulcerations or skin breaks. Chronic slightly decreased pedal pulses palpable bilaterally. No edema.  ABDOMEN: Bowel sounds present. Not distended. Soft. No tenderness, masses or organomegaly.  BREAST EXAM: Not examined as already performed by hematologist/oncologist.   PELVIC EXAM: Bimanual examination not examined as patient has had JACQUELINE/BSO due to noncancerous reasons.   RECTAL EXAM: Not examined as patient declined.  MUSCULOSKELETAL: No joint deformities or stiffness. She is ambulatory without problems.  SKIN: No rashes or suspicious lesions, normal color and turgor.  NEUROLOGIC: Cranial Nerves: II-XII grossly intact. DTR's: Knees, Ankles 2+ and equal bilaterally. Monofilament test unremarkable. Gait & Posture: Normal gait and fine motion.  PSYCHIATRIC: Patient alert, oriented x 3. Mood/Affect normal without acute anxiety depression noted. Judgment/insight good as she makes appropriate decisions during today's examination but slight decreased memory deficit noted.    Protective Sensation (w/ 10 gram monofilament):  Right: Intact  Left: Intact    Visual Inspection:  Normal -  Bilateral    Pedal Pulses:   Right: Diminshed  Left: Diminshed    Posterior tibialis:   Right:Diminshed  Left: Diminshed     CMP  Sodium   Date Value Ref Range Status   12/28/2018 142 136 - 145 mmol/L Final     Potassium   Date Value Ref Range Status   12/28/2018 4.0 3.5 - 5.1 mmol/L Final     Chloride   Date Value Ref Range Status   12/28/2018 106 95 - 110 mmol/L Final     CO2   Date Value Ref Range Status   12/28/2018 27 23 - 29 mmol/L Final     Glucose   Date Value Ref Range Status   12/28/2018 106 70 - 110 mg/dL Final     BUN, Bld   Date Value Ref Range Status    12/28/2018 13 8 - 23 mg/dL Final     Creatinine   Date Value Ref Range Status   12/28/2018 0.8 0.5 - 1.4 mg/dL Final     Calcium   Date Value Ref Range Status   12/28/2018 10.2 8.7 - 10.5 mg/dL Final     Total Protein   Date Value Ref Range Status   12/28/2018 7.3 6.0 - 8.4 g/dL Final     Albumin   Date Value Ref Range Status   12/28/2018 3.3 (L) 3.5 - 5.2 g/dL Final     Total Bilirubin   Date Value Ref Range Status   12/28/2018 0.4 0.1 - 1.0 mg/dL Final     Comment:     For infants and newborns, interpretation of results should be based  on gestational age, weight and in agreement with clinical  observations.  Premature Infant recommended reference ranges:  Up to 24 hours.............<8.0 mg/dL  Up to 48 hours............<12.0 mg/dL  3-5 days..................<15.0 mg/dL  6-29 days.................<15.0 mg/dL       Alkaline Phosphatase   Date Value Ref Range Status   12/28/2018 112 55 - 135 U/L Final     AST   Date Value Ref Range Status   12/28/2018 15 10 - 40 U/L Final     ALT   Date Value Ref Range Status   12/28/2018 14 10 - 44 U/L Final     Anion Gap   Date Value Ref Range Status   12/28/2018 9 8 - 16 mmol/L Final     eGFR if    Date Value Ref Range Status   12/28/2018 >60 >60 mL/min/1.73 m^2 Final     Lab Results   Component Value Date    WBC 7.91 12/28/2018    HGB 12.7 12/28/2018    HCT 37.7 12/28/2018    MCV 96 12/28/2018     12/28/2018     Lab Results   Component Value Date    HGBA1C 5.6 12/28/2018     ASSESSMENT:    ICD-10-CM ICD-9-CM    1. Annual physical exam Z00.00 V70.0    2. Hypertension associated with diabetes E11.59 250.80 TSH    I10 401.9 Lipid panel   3. Combined hyperlipidemia associated with type 2 diabetes mellitus E11.69 250.80 Lipid panel    E78.2 272.2    4. Diabetes mellitus due to underlying condition, controlled, with diabetic neuropathy, unspecified whether long term insulin use E08.40 249.60 Protein / creatinine ratio, urine     357.2 Hemoglobin A1c   5. Type  2 diabetes mellitus with peripheral artery disease E11.51 250.70 Protein / creatinine ratio, urine     443.81 Hemoglobin A1c   6. PAD (peripheral artery disease) I73.9 443.9    7. Vitamin D deficiency E55.9 268.9 Vitamin D   8. Disorder of bone and cartilage M89.9 733.90     M94.9     9. Coronary artery disease involving native coronary artery of native heart without angina pectoris I25.10 414.01    10. Carotid artery disease, unspecified laterality, unspecified type I77.9 447.9    11. Tobacco use disorder F17.200 305.1    12. Gastroesophageal reflux disease, esophagitis presence not specified K21.9 530.81    13. Benign colon polyp K63.5 211.3    14. Malignant neoplasm of left breast in female, estrogen receptor positive, unspecified site of breast C50.912 174.9     Z17.0 V86.0    15. Memory deficit R41.3 780.93    16. Postmenopausal Z78.0 V49.81      PLAN:  1. Age-appropriate counseling-appropriate low-sodium, low-cholesterol, low carbohydrate diet and exercise daily, monthly breast self exam, annual wellness examination.   2. Patient advised to call for results.  3. Continue current medications.  4. Keep follow up with specialists.  5. Annual eye and dental examinations advised.            6. Smoking cessation advised. 3- 5 minutes spent in counseling and discussion regarding smoking cessation, risks, etc; patient does not desires medication for smoking cessation and as noted above states she plans to have a friend to help her with smoking   cessation.            7. Follow up in about 6 months (around 11/4/2019) for diabetes and hypertension follow up.

## 2019-05-08 LAB
CREAT UR-MCNC: 71 MG/DL (ref 15–325)
PROT UR-MCNC: <7 MG/DL (ref 0–15)
PROT/CREAT UR: NORMAL MG/G{CREAT} (ref 0–0.2)

## 2019-06-18 ENCOUNTER — TELEPHONE (OUTPATIENT)
Dept: FAMILY MEDICINE | Facility: CLINIC | Age: 79
End: 2019-06-18

## 2019-06-18 NOTE — TELEPHONE ENCOUNTER
----- Message from Vidhi Hogue sent at 6/18/2019  4:28 PM CDT -----  Contact: pt  .Type:  Sooner Apoointment Request    Caller is requesting a sooner appointment.  Caller declined first available appointment listed below.  Caller will not accept being placed on the waitlist and is requesting a message be sent to doctor.  Name of Caller: pt  When is the first available appointment? 7/29  Symptoms: check up for smoking cessation   Would the patient rather a call back or a response via [facility]? Call back   Best Call Back Number: [phone] (home)   Additional Information:

## 2019-06-25 ENCOUNTER — OFFICE VISIT (OUTPATIENT)
Dept: FAMILY MEDICINE | Facility: CLINIC | Age: 79
End: 2019-06-25
Payer: MEDICARE

## 2019-06-25 VITALS
BODY MASS INDEX: 26.69 KG/M2 | HEART RATE: 66 BPM | SYSTOLIC BLOOD PRESSURE: 146 MMHG | OXYGEN SATURATION: 97 % | TEMPERATURE: 100 F | WEIGHT: 156.31 LBS | DIASTOLIC BLOOD PRESSURE: 68 MMHG | HEIGHT: 64 IN

## 2019-06-25 DIAGNOSIS — R35.0 URINE FREQUENCY: ICD-10-CM

## 2019-06-25 DIAGNOSIS — F17.200 TOBACCO USE DISORDER: ICD-10-CM

## 2019-06-25 DIAGNOSIS — N39.0 URINARY TRACT INFECTION WITHOUT HEMATURIA, SITE UNSPECIFIED: ICD-10-CM

## 2019-06-25 DIAGNOSIS — K21.9 GASTROESOPHAGEAL REFLUX DISEASE, ESOPHAGITIS PRESENCE NOT SPECIFIED: ICD-10-CM

## 2019-06-25 DIAGNOSIS — E55.9 VITAMIN D DEFICIENCY: ICD-10-CM

## 2019-06-25 DIAGNOSIS — E11.51 TYPE 2 DIABETES MELLITUS WITH PERIPHERAL ARTERY DISEASE: ICD-10-CM

## 2019-06-25 DIAGNOSIS — I15.2 HYPERTENSION ASSOCIATED WITH DIABETES: ICD-10-CM

## 2019-06-25 DIAGNOSIS — E11.59 HYPERTENSION ASSOCIATED WITH DIABETES: ICD-10-CM

## 2019-06-25 DIAGNOSIS — R41.3 MEMORY DEFICIT: ICD-10-CM

## 2019-06-25 LAB
BILIRUB SERPL-MCNC: ABNORMAL MG/DL
BLOOD URINE, POC: 250
COLOR, POC UA: ABNORMAL
GLUCOSE UR QL STRIP: NORMAL
KETONES UR QL STRIP: ABNORMAL
LEUKOCYTE ESTERASE URINE, POC: ABNORMAL
NITRITE, POC UA: ABNORMAL
PH, POC UA: 6
PROTEIN, POC: ABNORMAL
SPECIFIC GRAVITY, POC UA: 1.02
UROBILINOGEN, POC UA: NORMAL

## 2019-06-25 PROCEDURE — 3077F PR MOST RECENT SYSTOLIC BLOOD PRESSURE >= 140 MM HG: ICD-10-PCS | Mod: CPTII,S$GLB,, | Performed by: FAMILY MEDICINE

## 2019-06-25 PROCEDURE — 99999 PR PBB SHADOW E&M-EST. PATIENT-LVL IV: ICD-10-PCS | Mod: PBBFAC,,, | Performed by: FAMILY MEDICINE

## 2019-06-25 PROCEDURE — 99214 OFFICE O/P EST MOD 30 MIN: CPT | Mod: 25,S$GLB,, | Performed by: FAMILY MEDICINE

## 2019-06-25 PROCEDURE — 81002 URINALYSIS NONAUTO W/O SCOPE: CPT | Mod: S$GLB,,, | Performed by: FAMILY MEDICINE

## 2019-06-25 PROCEDURE — 3077F SYST BP >= 140 MM HG: CPT | Mod: CPTII,S$GLB,, | Performed by: FAMILY MEDICINE

## 2019-06-25 PROCEDURE — 3078F PR MOST RECENT DIASTOLIC BLOOD PRESSURE < 80 MM HG: ICD-10-PCS | Mod: CPTII,S$GLB,, | Performed by: FAMILY MEDICINE

## 2019-06-25 PROCEDURE — 1101F PT FALLS ASSESS-DOCD LE1/YR: CPT | Mod: CPTII,S$GLB,, | Performed by: FAMILY MEDICINE

## 2019-06-25 PROCEDURE — 81002 POCT URINE DIPSTICK WITHOUT MICROSCOPE: ICD-10-PCS | Mod: S$GLB,,, | Performed by: FAMILY MEDICINE

## 2019-06-25 PROCEDURE — 99999 PR PBB SHADOW E&M-EST. PATIENT-LVL IV: CPT | Mod: PBBFAC,,, | Performed by: FAMILY MEDICINE

## 2019-06-25 PROCEDURE — 99214 PR OFFICE/OUTPT VISIT, EST, LEVL IV, 30-39 MIN: ICD-10-PCS | Mod: 25,S$GLB,, | Performed by: FAMILY MEDICINE

## 2019-06-25 PROCEDURE — 1101F PR PT FALLS ASSESS DOC 0-1 FALLS W/OUT INJ PAST YR: ICD-10-PCS | Mod: CPTII,S$GLB,, | Performed by: FAMILY MEDICINE

## 2019-06-25 PROCEDURE — 3078F DIAST BP <80 MM HG: CPT | Mod: CPTII,S$GLB,, | Performed by: FAMILY MEDICINE

## 2019-06-25 RX ORDER — LOSARTAN POTASSIUM 50 MG/1
TABLET ORAL
Qty: 90 TABLET | Refills: 1 | Status: SHIPPED | OUTPATIENT
Start: 2019-06-25 | End: 2019-11-11 | Stop reason: SDUPTHER

## 2019-06-25 RX ORDER — PANTOPRAZOLE SODIUM 40 MG/1
TABLET, DELAYED RELEASE ORAL
Qty: 90 TABLET | Refills: 1 | Status: SHIPPED | OUTPATIENT
Start: 2019-06-25 | End: 2019-11-11 | Stop reason: SDUPTHER

## 2019-06-25 RX ORDER — CIPROFLOXACIN 500 MG/1
500 TABLET ORAL 2 TIMES DAILY
Qty: 10 TABLET | Refills: 0 | Status: SHIPPED | OUTPATIENT
Start: 2019-06-25 | End: 2019-06-30

## 2019-06-25 RX ORDER — GLIPIZIDE 5 MG/1
TABLET ORAL
Qty: 45 TABLET | Refills: 1 | Status: SHIPPED | OUTPATIENT
Start: 2019-06-25 | End: 2019-11-11 | Stop reason: SDUPTHER

## 2019-06-25 NOTE — PROGRESS NOTES
Danyelle Garcia    Chief Complaint   Patient presents with    Consult       History of Present Illness:   Ms. Garcia comes in today for consultation.  She is not fasting but has taken medication today.    She complains of having urine frequency with urgency for least a year.  She denies having dysuria, hematuria, abdominal pain, nausea, vomiting, diarrhea, constipation, back pain, fever, chills, fatigue, appetite changes, chest pain, palpitations, leg swelling, shortness of breath, cough, wheezing.    She does not perform glucose checks as she states her machine is broken.    She reports having slight headache for 1 week possibly due to house issues which she states she has recently had.  She states headache went away after her house issues resolved.    She reports having memory problems for more than a year.  She denies associated anxiety, depression, homicidal or suicidal thoughts.  She saw Dr. Mason, neurologist, on January 22, 2019 for memory deficit surveillance with 1-year follow up advised. She states she occasionally drinks beer and smokes cigarettes.  She denies illicit drug use.    She reports no history of glaucoma.    She saw Dr. Fredi Garces, cardiologist, on January 16, 2019 for surveillance of nonobstructive CAD by heart catheterization performed in March 2018, left bundle branch block, moderate carotid disease (40-59% bilaterally noted in January 2018), hypertension, hyperlipidemia, type 2 diabetes mellitus, history of breast cancer and advised to continue statin and Cozaar with 6-month follow-up advised.    She continues to follow with hematologist/oncologist Dr. Finley for breast cancer surveillance with breast exam and mammogram with last visit on July 12, 2018 with 6-month follow up with CBC, CMP advised. However, on January 2, 2019 she saw hematology/oncology NP Annabelle Foley with 6-month follow up labs (CBC, CMP) advised and scheduled for July 2, 2019 and with 1-year follow up  advised.        Urine dipstick ordered by me today - pH 6, specific gravity 1.025, 2+ leukocyte, + nitrite, 30+ protein, 250+ blood, otherwise unremarkable.      Labs:                    WBC                      7.91                12/28/2018                 HGB                      12.7                12/28/2018                 HCT                      37.7                12/28/2018                 PLT                      177                 12/28/2018                 CHOL                     238 (H)             05/07/2019                 TRIG                     69                  05/07/2019                 HDL                      77 (H)              05/07/2019                 ALT                      14                  12/28/2018                 AST                      15                  12/28/2018                 NA                       142                 12/28/2018                 K                        4.0                 12/28/2018                 CL                       106                 12/28/2018                 CREATININE               0.8                 12/28/2018                 BUN                      13                  12/28/2018                 CO2                      27                  12/28/2018                 TSH                      1.335               05/07/2019                 HGBA1C                   5.6                 05/07/2019              LDLCALC                  147.2               05/07/2019       Vit D, 25-Hydroxy            27                  05/07/2019      Current Outpatient Medications   Medication Sig    aspirin (ECOTRIN) 81 MG EC tablet Take 81 mg by mouth daily as needed for Pain.    atorvastatin (LIPITOR) 80 MG tablet Take 1 tablet (80 mg total) by mouth every evening.    blood sugar diagnostic Strp 1 strip by Misc.(Non-Drug; Combo Route) route 2 (two) times daily.    calcium carbonate (OS-DAMIAN) 600 mg (1,500 mg) Tab Take 600 mg by mouth 2 (two) times  daily with meals.    glipiZIDE (GLUCOTROL) 5 MG tablet TAKE 1/2 TABLET BY MOUTH WITH BREAKFAST    lancets (TRUEPLUS LANCETS) 30 gauge Misc Inject 1 lancet into the skin 2 (two) times daily.    losartan (COZAAR) 50 MG tablet TAKE 1 TABLET(50 MG) BY MOUTH EVERY DAY    pantoprazole (PROTONIX) 40 MG tablet TK 1 T PO  ONCE D    blood glucose control, normal (ACCU-CHEK SMARTVIEW CONTRL SOL) Soln 1 each by Misc.(Non-Drug; Combo Route) route 2 (two) times daily.    blood-glucose meter kit Use as instructed       Review of Systems   Constitutional: Negative for activity change, appetite change, chills, fatigue and fever.        Weight  70 kg (154 lb 5.2 oz) at May 7, 2019 visit.   Eyes:        See history of present illness.   Respiratory: Negative for cough, shortness of breath and wheezing.    Cardiovascular: Negative for chest pain, palpitations and leg swelling.        See history of present illness.   Gastrointestinal: Negative for abdominal pain, constipation, diarrhea, nausea and vomiting.   Endocrine: Positive for polyuria. Negative for polydipsia and polyphagia.        See history of present illness.   Genitourinary: Positive for frequency and urgency. Negative for difficulty urinating, dysuria and hematuria.   Musculoskeletal: Positive for arthralgias. Negative for back pain.   Neurological: Negative for headaches.        Positive for memory deficit.   Hematological:        See history of present illness.   Psychiatric/Behavioral: Negative for dysphoric mood and suicidal ideas. The patient is not nervous/anxious.         See history of present illness. Negative for homicidal ideas.       Objective:  Physical Exam   Constitutional: She is oriented to person, place, and time. She appears well-developed and well-nourished. No distress.   Elderly and pleasant.   Neck: Normal range of motion. Neck supple. No thyromegaly present.   Cardiovascular: Normal rate and regular rhythm.   Pulses:       Dorsalis pedis  pulses are 1+ on the right side, and 1+ on the left side.        Posterior tibial pulses are 1+ on the right side, and 1+ on the left side.   Chronic decreased pedal pulses.   Pulmonary/Chest: Effort normal and breath sounds normal. No respiratory distress. She has no wheezes. Left breast exhibits no inverted nipple, no mass, no nipple discharge, no skin change and no tenderness. No breast swelling, tenderness or discharge.   Abdominal: Soft. Bowel sounds are normal. She exhibits no distension and no mass. There is no tenderness. There is no rebound and no guarding.   No CVA tenderness noted.   Genitourinary: No breast swelling, tenderness or discharge.   Musculoskeletal: Normal range of motion. She exhibits no edema or tenderness.   She is ambulatory without problems. Feet look okay without ulcerations or skin breaks. Nontender hips with full range of motion noted.   Lymphadenopathy:     She has no cervical adenopathy.     She has no axillary adenopathy.   Neurological: She is alert and oriented to person, place, and time.   Skin: No rash noted. She is not diaphoretic. No erythema.   Flesh-colored skin tag at left neck noted.   Psychiatric: She has a normal mood and affect. Her behavior is normal. Judgment and thought content normal.   Vitals reviewed.      ASSESSMENT:  1. Urinary tract infection without hematuria, site unspecified    2. Urine frequency    3. Vitamin D deficiency    4. Memory deficit    5. Type 2 diabetes mellitus with peripheral artery disease    6. Hypertension associated with diabetes    7. Gastroesophageal reflux disease, esophagitis presence not specified    8. Tobacco use disorder        PLAN:  Danyelle was seen today for consult.    Diagnoses and all orders for this visit:    Urinary tract infection without hematuria, site unspecified  -     ciprofloxacin HCl (CIPRO) 500 MG tablet; Take 1 tablet (500 mg total) by mouth 2 (two) times daily. for 5 days    Urine frequency  -     POCT URINE  DIPSTICK WITHOUT MICROSCOPE  -     ciprofloxacin HCl (CIPRO) 500 MG tablet; Take 1 tablet (500 mg total) by mouth 2 (two) times daily. for 5 days    Vitamin D deficiency    Memory deficit  -     Ambulatory referral to Neurology    Type 2 diabetes mellitus with peripheral artery disease  -     glipiZIDE (GLUCOTROL) 5 MG tablet; TAKE 1/2 TABLET BY MOUTH WITH BREAKFAST    Hypertension associated with diabetes  -     losartan (COZAAR) 50 MG tablet; TAKE 1 TABLET(50 MG) BY MOUTH EVERY DAY    Gastroesophageal reflux disease, esophagitis presence not specified  -     pantoprazole (PROTONIX) 40 MG tablet; TK 1 T PO  ONCE D    Tobacco use disorder       Cipro 500 mg twice daily x 5 days; medication precautions discussed with patient.  Continue current medications, follow low sodium, low cholesterol, low carb diet, daily walks.  Add OTC vit D 1000 units daily  Prescription refills as noted above.  Keep follow up with specialists.  Advised patient to Activate her membership for Proposed smoking cessation program, then check online to see what providers she can see.  Smoking cessation advised.  Keep 11/11/2019 scheduled visit with me for diabetes and hypertension follow up. But, No follow-ups on file.

## 2019-07-01 ENCOUNTER — TELEPHONE (OUTPATIENT)
Dept: FAMILY MEDICINE | Facility: CLINIC | Age: 79
End: 2019-07-01

## 2019-07-01 NOTE — TELEPHONE ENCOUNTER
----- Message from Angelica Tovar sent at 7/1/2019  8:42 AM CDT -----  Contact: pt   Call pt in regards to some meds that she was put on and its not agreeing with her.   .[phone] (home)

## 2019-07-01 NOTE — TELEPHONE ENCOUNTER
Pt is taking Ciprofloxacin 500 mg and says that she is feeling light headed. Pt says she is also taking Meloxicam  15 mg. Pt was informed to stop the Meloxicam 15 mg because one of side effects of this medication is dizziness until speaking with Dr. Chan, Pt would also like a rx sent in for gum for the smoking cessation. Pt voiced understanding

## 2019-07-05 ENCOUNTER — TELEPHONE (OUTPATIENT)
Dept: NEUROLOGY | Facility: CLINIC | Age: 79
End: 2019-07-05

## 2019-07-05 NOTE — TELEPHONE ENCOUNTER
----- Message from Vidhi Hogue sent at 7/5/2019 12:01 PM CDT -----  Contact: pt  .Type:  Sooner Apoointment Request    Caller is requesting a sooner appointment.  Caller declined first available appointment listed below.  Caller will not accept being placed on the waitlist and is requesting a message be sent to doctor.  Name of Caller:  When is the first available appointment? 11/13  Symptoms: Memory deficit  Would the patient rather a call back or a response via [facility]? Call back   Best Call Back Number: [phone] (home)   Additional Information: pt requesting 7/11

## 2019-07-24 ENCOUNTER — TELEPHONE (OUTPATIENT)
Dept: FAMILY MEDICINE | Facility: CLINIC | Age: 79
End: 2019-07-24

## 2019-07-24 DIAGNOSIS — F17.200 TOBACCO USE DISORDER: Primary | ICD-10-CM

## 2019-07-24 RX ORDER — MICONAZOLE NITRATE 2 %
2 CREAM (GRAM) TOPICAL
Qty: 300 EACH | Refills: 2 | Status: SHIPPED | OUTPATIENT
Start: 2019-07-24 | End: 2021-01-28

## 2019-07-24 NOTE — TELEPHONE ENCOUNTER
Pt states needs prescription medication to quit smoking. Pt states she needs the gum or lozenge not the patch. Please advise.

## 2019-07-24 NOTE — TELEPHONE ENCOUNTER
----- Message from Sam Lozada sent at 7/24/2019  8:57 AM CDT -----  Contact: Pt  States she's calling rg following up on her smoking cessation prescription and can be reached at [phone]//thanks/dbw

## 2019-07-31 ENCOUNTER — TELEPHONE (OUTPATIENT)
Dept: FAMILY MEDICINE | Facility: CLINIC | Age: 79
End: 2019-07-31

## 2019-07-31 NOTE — TELEPHONE ENCOUNTER
----- Message from Aure Kowalski sent at 7/31/2019  3:07 PM CDT -----  Contact: Patient  .Type:  RX Refill Request    Who Called: Patient  Refill or New Rx:Refill  RX Name and Strength:nicotine, polacrilex, (NICORETTE) 2 mg Gum  How is the patient currently taking it? (ex. 1XDay):  Is this a 30 day or 90 day RX:  Preferred Pharmacy with phone number:.  [id number] - [place] - [address] AT [facility]  [address] [place] [address]  Phone: [phone] Fax: [phone]      Local or Mail Order: Local  Ordering Provider: Dustin  Would the patient rather a call back or a response via [facility]? Call  Best Call Back Number:[phone]  Additional Information:Pharmacy still haven't received the prescription.

## 2019-07-31 NOTE — TELEPHONE ENCOUNTER
----- Message from Lupe Luis sent at 7/31/2019  3:55 PM CDT -----  Contact: self/[phone]  Type:  Patient Returning Call    Who Called:Danyelle Garcia    Who Left Message for Patient:Ronna  Does the patient know what this is regarding?:medication  Would the patient rather a call back or a response via [facility]? Call back   Best Call Back Number:[phone]  Additional Information:

## 2019-09-09 DIAGNOSIS — K59.00 CONSTIPATION, UNSPECIFIED CONSTIPATION TYPE: ICD-10-CM

## 2019-09-10 RX ORDER — POLYETHYLENE GLYCOL 3350 17 G/17G
POWDER, FOR SOLUTION ORAL
Qty: 527 G | Refills: 0 | Status: SHIPPED | OUTPATIENT
Start: 2019-09-10 | End: 2022-01-26

## 2019-09-10 RX ORDER — GLUCOSAM/CHON-MSM1/C/MANG/BOSW 500-416.6
TABLET ORAL
Qty: 100 EACH | Refills: 0 | Status: SHIPPED | OUTPATIENT
Start: 2019-09-10

## 2019-10-28 ENCOUNTER — PATIENT OUTREACH (OUTPATIENT)
Dept: ADMINISTRATIVE | Facility: HOSPITAL | Age: 79
End: 2019-10-28

## 2019-10-28 NOTE — PROGRESS NOTES
PreVisit Chart Audit Perfomed       Dena LOPEZ LPN Care Coordinator  Care Coordination Department  [facility]  [phone]

## 2019-11-05 ENCOUNTER — OFFICE VISIT (OUTPATIENT)
Dept: NEUROLOGY | Facility: CLINIC | Age: 79
End: 2019-11-05
Payer: MEDICARE

## 2019-11-05 ENCOUNTER — LAB VISIT (OUTPATIENT)
Dept: LAB | Facility: HOSPITAL | Age: 79
End: 2019-11-05
Attending: PSYCHIATRY & NEUROLOGY
Payer: MEDICARE

## 2019-11-05 VITALS
BODY MASS INDEX: 27.33 KG/M2 | HEIGHT: 64 IN | WEIGHT: 160.06 LBS | DIASTOLIC BLOOD PRESSURE: 80 MMHG | SYSTOLIC BLOOD PRESSURE: 142 MMHG | HEART RATE: 62 BPM

## 2019-11-05 DIAGNOSIS — I65.22 STENOSIS OF LEFT CAROTID ARTERY: ICD-10-CM

## 2019-11-05 DIAGNOSIS — H81.12 BENIGN PAROXYSMAL POSITIONAL VERTIGO OF LEFT EAR: ICD-10-CM

## 2019-11-05 DIAGNOSIS — I15.2 HYPERTENSION ASSOCIATED WITH DIABETES: ICD-10-CM

## 2019-11-05 DIAGNOSIS — I65.22 STENOSIS OF LEFT CAROTID ARTERY: Primary | ICD-10-CM

## 2019-11-05 DIAGNOSIS — E11.51 TYPE 2 DIABETES MELLITUS WITH PERIPHERAL ARTERY DISEASE: ICD-10-CM

## 2019-11-05 DIAGNOSIS — F17.200 TOBACCO USE DISORDER: ICD-10-CM

## 2019-11-05 DIAGNOSIS — E11.59 HYPERTENSION ASSOCIATED WITH DIABETES: ICD-10-CM

## 2019-11-05 LAB — ERYTHROCYTE [SEDIMENTATION RATE] IN BLOOD BY WESTERGREN METHOD: 36 MM/HR (ref 0–20)

## 2019-11-05 PROCEDURE — 3079F PR MOST RECENT DIASTOLIC BLOOD PRESSURE 80-89 MM HG: ICD-10-PCS | Mod: CPTII,S$GLB,, | Performed by: PSYCHIATRY & NEUROLOGY

## 2019-11-05 PROCEDURE — 1101F PR PT FALLS ASSESS DOC 0-1 FALLS W/OUT INJ PAST YR: ICD-10-PCS | Mod: CPTII,S$GLB,, | Performed by: PSYCHIATRY & NEUROLOGY

## 2019-11-05 PROCEDURE — 1101F PT FALLS ASSESS-DOCD LE1/YR: CPT | Mod: CPTII,S$GLB,, | Performed by: PSYCHIATRY & NEUROLOGY

## 2019-11-05 PROCEDURE — 85651 RBC SED RATE NONAUTOMATED: CPT

## 2019-11-05 PROCEDURE — 99214 OFFICE O/P EST MOD 30 MIN: CPT | Mod: S$GLB,,, | Performed by: PSYCHIATRY & NEUROLOGY

## 2019-11-05 PROCEDURE — 3077F SYST BP >= 140 MM HG: CPT | Mod: CPTII,S$GLB,, | Performed by: PSYCHIATRY & NEUROLOGY

## 2019-11-05 PROCEDURE — 99999 PR PBB SHADOW E&M-EST. PATIENT-LVL III: ICD-10-PCS | Mod: PBBFAC,,, | Performed by: PSYCHIATRY & NEUROLOGY

## 2019-11-05 PROCEDURE — 99999 PR PBB SHADOW E&M-EST. PATIENT-LVL III: CPT | Mod: PBBFAC,,, | Performed by: PSYCHIATRY & NEUROLOGY

## 2019-11-05 PROCEDURE — 3079F DIAST BP 80-89 MM HG: CPT | Mod: CPTII,S$GLB,, | Performed by: PSYCHIATRY & NEUROLOGY

## 2019-11-05 PROCEDURE — 3077F PR MOST RECENT SYSTOLIC BLOOD PRESSURE >= 140 MM HG: ICD-10-PCS | Mod: CPTII,S$GLB,, | Performed by: PSYCHIATRY & NEUROLOGY

## 2019-11-05 PROCEDURE — 36415 COLL VENOUS BLD VENIPUNCTURE: CPT

## 2019-11-05 PROCEDURE — 99214 PR OFFICE/OUTPT VISIT, EST, LEVL IV, 30-39 MIN: ICD-10-PCS | Mod: S$GLB,,, | Performed by: PSYCHIATRY & NEUROLOGY

## 2019-11-05 NOTE — PROGRESS NOTES
"Subjective:      Patient ID: Danyelle Garcia is a 79 y.o. female.    Chief Complaint:   "My head is worrying me. "    This patient returns for follow-up visit after having been seen previously by Dr. Vu prior to his leaving the practice.  The patient states that she has symptoms that "come and go" which she describes further as an aching pain.  The pain can localize to the area around the right eye but is also felt in random distribution throughout the rest of the head.  The patient is not aware of any particular tenderness to the right temple.  She also denies symptoms of jaw claudication, migratory muscle pain, fever, or night sweats.  The patient states that this symptom has been present for several years but is still bothersome to her.  She occasionally will take Tylenol for relief and then will lay down.  After sleeping for a short nap the headache pain is resolved.    The patient also has had symptoms of positional vertigo.  The patient states that she will experience a sensation of vertigo when she is supine in bed and then turns to the left with the ear down.  If she remains in that position for several minutes, the vertigo subsides spontaneously.  The patient by her description has had ENG done which confirmed the presence of benign positional vertigo.    The patient denies to me any loss of vision in 1 eye or the other.  She denies any weakness of the arms or legs.  She denies any numbness, tingling, or other paresthesia.  She denies any syncope or unexplained loss of consciousness.    The patient does have a known stenosis of the left internal carotid artery that was demonstrated on imaging about 1 year ago.  The patient has not had any follow-up studies done.          ROS:  GENERAL: NO FEVER, CHILLS, FATIGABILITY OR WEIGHT LOSS.  SKIN: NO RASHES, ITCHING OR CHANGES IN COLOR OR TEXTURE OF SKIN.  HEAD: NO RECENT HEAD TRAUMA.  EYES: VISUAL ACUITY FINE. NO PHOTOPHOBIA, OCULAR PAIN OR DIPLOPIA.  EARS: " DENIES EAR PAIN, DISCHARGE  NOSE: NO LOSS OF SMELL, NO EPISTAXIS OR POSTNASAL DRIP.  MOUTH & THROAT: NO HOARSENESS OR CHANGE IN VOICE. NO EXCESSIVE GUM BLEEDING.  NODES: DENIES SWOLLEN GLANDS.  CHEST: DENIES BRITO, CYANOSIS, WHEEZING, COUGH AND SPUTUM PRODUCTION.  CARDIOVASCULAR: DENIES CHEST PAIN, PND, ORTHOPNEA   ABDOMEN: APPETITE FINE. NO WEIGHT LOSS. DENIES DIARRHEA, ABDOMINAL PAIN, HEMATEMESIS OR BLOOD IN STOOL.  URINARY: NO FLANK PAIN, DYSURIA OR HEMATURIA.  PERIPHERAL VASCULAR: NO CLAUDICATION OR CYANOSIS.  MUSCULOSKELETAL: NO JOINT STIFFNESS OR SWELLING. DENIES BACK PAIN.  NEUROLOGIC: NO HISTORY OF SEIZURES, PARALYSIS, ALTERATION OF GAIT OR COORDINATION.    Past Medical History:   Diagnosis Date    Breast cancer 2006    left breast treated with lumpectomy, radiation, and chemo    CAD (coronary artery disease)     Colon polyp     Diabetes mellitus, type 2     Diverticular disease     Dizziness     External hemorrhoid     H. pylori infection     Headache     Hyperlipidemia     Hypertension     Insomnia     Osteopenia     Postmenopausal     Tobacco use     Vitamin D deficiency disease      Past Surgical History:   Procedure Laterality Date    BREAST LUMPECTOMY Left     CATARACT EXTRACTION      CHOLECYSTECTOMY      COLONOSCOPY      EYE SURGERY Right     cataract    HYSTERECTOMY      left lumpectomy      TOTAL ABDOMINAL HYSTERECTOMY W/ BILATERAL SALPINGOOPHORECTOMY      Due to benign reasons per patient    UPPER GASTROINTESTINAL ENDOSCOPY       Family History   Problem Relation Age of Onset    Hypertension Mother     Heart attack Father     Heart disease Father         MI/CAD    Diabetes Daughter     Diabetes Son     No Known Problems Daughter     No Known Problems Son     Cancer Neg Hx     Stroke Neg Hx      Social History     Socioeconomic History    Marital status:      Spouse name: Not on file    Number of children: 4    Years of education: Not on file    Highest  education level: Not on file   Occupational History    Occupation: Retired   Social Needs    Financial resource strain: Not on file    Food insecurity:     Worry: Not on file     Inability: Not on file    Transportation needs:     Medical: Not on file     Non-medical: Not on file   Tobacco Use    Smoking status: Current Some Day Smoker     Packs/day: 0.40     Years: 40.00     Pack years: 16.00     Types: Cigarettes    Smokeless tobacco: Never Used    Tobacco comment: some times tries to quit   Substance and Sexual Activity    Alcohol use: Yes     Alcohol/week: 6.0 standard drinks     Types: 6 Cans of beer per week     Comment: Occasionally    Drug use: No    Sexual activity: Not Currently   Lifestyle    Physical activity:     Days per week: 0 days     Minutes per session: 0 min    Stress: To some extent   Relationships    Social connections:     Talks on phone: Three times a week     Gets together: Three times a week     Attends [employer] service: More than 4 times per year     Active member of club or organization: No     Attends meetings of clubs or organizations: Never     Relationship status:    Other Topics Concern    Not on file   Social History Narrative    She wears seatbelt. She lives alone and continues to drive.         Objective:   PE:   VITAL SIGNS:   Height 5 ft 4 in, weight 72.6 kg, BMI 27.47  Vitals:    11/05/19 1302   BP: (!) 142/80   Pulse: 62     APPEARANCE: WELL NOURISHED, WELL DEVELOPED, IN NO ACUTE DISTRESS.    HEAD: NORMOCEPHALIC, ATRAUMATIC. DENIES TEMPORAL TENDERNESS ON EITHER SIDE.  EYES: PERRL. EOMI.  NON-ICTERIC SCLERAE.    NOSE: MUCOSA PINK. AIRWAY CLEAR.  MOUTH & THROAT: NO TONSILLAR ENLARGEMENT. NO PHARYNGEAL ERYTHEMA OR EXUDATE. NO STRIDOR.  NECK: SUPPLE.  SOFT BRUIT HEARD OVER LEFT CAROTID ARTERY  CHEST: LUNGS CLEAR TO AUSCULTATION.  CARDIOVASCULAR: REGULAR RHYTHM WITHOUT SIGNIFICANT MURMURS.  ABDOMEN: BOWEL SOUNDS NORMAL. NOT DISTENDED.   MUSCULOSKELETAL:  NO  BONY DEFORMITY SEEN.  MUSCLE TONE AND MUSCLE MASS ARE NORMAL IN BOTH UPPER AND BOTH LOWER EXTREMITIES.    NEUROLOGIC:   MENTAL STATUS:  THE PATIENT IS WELL ORIENTED TO PERSON, TIME, PLACE, AND SITUATION.  THE PATIENT IS ATTENTIVE TO THE ENVIRONMENT AND COOPERATIVE FOR THE EXAM.  CRANIAL NERVES: II-XII GROSSLY INTACT. FUNDOSCOPIC EXAM IS NORMAL.  NO HEMORRHAGE, EXUDATE OR PAPILLEDEMA IS PRESENT. THE EXTRAOCULAR MUSCLES ARE INTACT IN THE CARDINAL DIRECTIONS OF GAZE.  NO PTOSIS IS PRESENT. FACIAL FEATURES ARE SYMMETRICAL.  SPEECH IS NORMAL IN FLUENCY, DICTION, AND PHRASING.  TONGUE PROTRUDES IN THE MIDLINE.    GAIT AND STATION:  ROMBERG IS NEGATIVE.  GOOD ALTERNATE ARMSWING WITH NORMAL GAIT.  MOTOR:  NO DOWNDRIFT OF EITHER ARM WHEN HELD AT SHOULDER LEVEL.  MANUAL MUSCLE TESTING OF PROXIMAL AND DISTAL MUSCLES OF BOTH UPPER AND LOWER EXTREMITIES IS NORMAL. MUSCLE MASS IS NORMAL.  MUSCLE TONE IS NORMAL.  SENSORY:  INTACT BOTH UPPER AND LOWER EXTREMITIES TO PIN PRICK, TOUCH, AND VIBRATION.  CEREBELLAR:  FINGER TO NOSE DONE WELL.  ALTERNATING MOVEMENTS INTACT.  NO INVOLUNTARY MOVEMENTS OR TREMOR SEEN.  REFLEXES:  STRETCH REFLEXES ARE 2+ BOTH UPPER AND LOWER EXTREMITIES.  PLANTAR STIMULATION IS FLEXOR BILATERALLY AND NO PATHOLOGICAL REFLEXES ARE SEEN              Assessment:     Encounter Diagnoses   Name Primary?    Stenosis of left carotid artery Yes    Hypertension associated with diabetes     Type 2 diabetes mellitus with peripheral artery disease     Benign paroxysmal positional vertigo of left ear     Tobacco use disorder        Plan:     1.  The patient needs carotid ultrasound follow-up for the left carotid artery stenosis  2. Sedimentation rate.  Even though the patient does not have temporal tenderness, she is complaining of persistent pain in and around the left temple and left eye.  3.  Depending upon results of carotid ultrasound and sed rate, further recommendations will be made.  4.  Return to Neurology  as needed.      This was a 35 min visit with the patient with over 50% time spent counseling the patient regarding her history and physical findings.  This note is generated with speech recognition software and is subject to transcription error and sound alike phrases that may be missed by proofreading.

## 2019-11-11 ENCOUNTER — OFFICE VISIT (OUTPATIENT)
Dept: FAMILY MEDICINE | Facility: CLINIC | Age: 79
End: 2019-11-11
Payer: MEDICARE

## 2019-11-11 ENCOUNTER — LAB VISIT (OUTPATIENT)
Dept: LAB | Facility: HOSPITAL | Age: 79
End: 2019-11-11
Payer: MEDICARE

## 2019-11-11 VITALS
DIASTOLIC BLOOD PRESSURE: 78 MMHG | WEIGHT: 158.81 LBS | TEMPERATURE: 98 F | OXYGEN SATURATION: 97 % | SYSTOLIC BLOOD PRESSURE: 138 MMHG | HEART RATE: 65 BPM | BODY MASS INDEX: 27.27 KG/M2

## 2019-11-11 DIAGNOSIS — E11.59 HYPERTENSION ASSOCIATED WITH DIABETES: Primary | ICD-10-CM

## 2019-11-11 DIAGNOSIS — E11.69 COMBINED HYPERLIPIDEMIA ASSOCIATED WITH TYPE 2 DIABETES MELLITUS: ICD-10-CM

## 2019-11-11 DIAGNOSIS — E78.2 COMBINED HYPERLIPIDEMIA ASSOCIATED WITH TYPE 2 DIABETES MELLITUS: ICD-10-CM

## 2019-11-11 DIAGNOSIS — I25.10 CORONARY ARTERY DISEASE INVOLVING NATIVE CORONARY ARTERY OF NATIVE HEART WITHOUT ANGINA PECTORIS: Chronic | ICD-10-CM

## 2019-11-11 DIAGNOSIS — M94.9 DISORDER OF BONE AND CARTILAGE: ICD-10-CM

## 2019-11-11 DIAGNOSIS — E55.9 VITAMIN D DEFICIENCY: ICD-10-CM

## 2019-11-11 DIAGNOSIS — I15.2 HYPERTENSION ASSOCIATED WITH DIABETES: ICD-10-CM

## 2019-11-11 DIAGNOSIS — E11.59 HYPERTENSION ASSOCIATED WITH DIABETES: ICD-10-CM

## 2019-11-11 DIAGNOSIS — Z85.3 HISTORY OF LEFT BREAST CANCER: ICD-10-CM

## 2019-11-11 DIAGNOSIS — M89.9 DISORDER OF BONE AND CARTILAGE: ICD-10-CM

## 2019-11-11 DIAGNOSIS — K21.9 GASTROESOPHAGEAL REFLUX DISEASE, ESOPHAGITIS PRESENCE NOT SPECIFIED: ICD-10-CM

## 2019-11-11 DIAGNOSIS — F17.200 TOBACCO USE DISORDER: ICD-10-CM

## 2019-11-11 DIAGNOSIS — I15.2 HYPERTENSION ASSOCIATED WITH DIABETES: Primary | ICD-10-CM

## 2019-11-11 DIAGNOSIS — E11.51 TYPE 2 DIABETES MELLITUS WITH PERIPHERAL ARTERY DISEASE: ICD-10-CM

## 2019-11-11 DIAGNOSIS — K59.00 CONSTIPATION, UNSPECIFIED CONSTIPATION TYPE: ICD-10-CM

## 2019-11-11 DIAGNOSIS — E78.5 HYPERLIPIDEMIA, UNSPECIFIED HYPERLIPIDEMIA TYPE: ICD-10-CM

## 2019-11-11 DIAGNOSIS — I73.9 PAD (PERIPHERAL ARTERY DISEASE): ICD-10-CM

## 2019-11-11 DIAGNOSIS — I65.22 STENOSIS OF LEFT CAROTID ARTERY: ICD-10-CM

## 2019-11-11 DIAGNOSIS — E08.40 DIABETES MELLITUS DUE TO UNDERLYING CONDITION, CONTROLLED, WITH DIABETIC NEUROPATHY, UNSPECIFIED WHETHER LONG TERM INSULIN USE: ICD-10-CM

## 2019-11-11 LAB
ALBUMIN SERPL BCP-MCNC: 3.7 G/DL (ref 3.5–5.2)
ALP SERPL-CCNC: 109 U/L (ref 55–135)
ALT SERPL W/O P-5'-P-CCNC: 16 U/L (ref 10–44)
ANION GAP SERPL CALC-SCNC: 11 MMOL/L (ref 8–16)
AST SERPL-CCNC: 18 U/L (ref 10–40)
BASOPHILS # BLD AUTO: 0.06 K/UL (ref 0–0.2)
BASOPHILS NFR BLD: 0.9 % (ref 0–1.9)
BILIRUB SERPL-MCNC: 0.3 MG/DL (ref 0.1–1)
BUN SERPL-MCNC: 18 MG/DL (ref 8–23)
CALCIUM SERPL-MCNC: 10.5 MG/DL (ref 8.7–10.5)
CHLORIDE SERPL-SCNC: 102 MMOL/L (ref 95–110)
CO2 SERPL-SCNC: 25 MMOL/L (ref 23–29)
CREAT SERPL-MCNC: 0.7 MG/DL (ref 0.5–1.4)
DIFFERENTIAL METHOD: ABNORMAL
EOSINOPHIL # BLD AUTO: 0.2 K/UL (ref 0–0.5)
EOSINOPHIL NFR BLD: 2.5 % (ref 0–8)
ERYTHROCYTE [DISTWIDTH] IN BLOOD BY AUTOMATED COUNT: 13.6 % (ref 11.5–14.5)
EST. GFR  (AFRICAN AMERICAN): >60 ML/MIN/1.73 M^2
EST. GFR  (NON AFRICAN AMERICAN): >60 ML/MIN/1.73 M^2
GLUCOSE SERPL-MCNC: 42 MG/DL (ref 70–110)
HCT VFR BLD AUTO: 40.8 % (ref 37–48.5)
HGB BLD-MCNC: 13.3 G/DL (ref 12–16)
IMM GRANULOCYTES # BLD AUTO: 0.04 K/UL (ref 0–0.04)
IMM GRANULOCYTES NFR BLD AUTO: 0.6 % (ref 0–0.5)
LYMPHOCYTES # BLD AUTO: 2.5 K/UL (ref 1–4.8)
LYMPHOCYTES NFR BLD: 37 % (ref 18–48)
MCH RBC QN AUTO: 33 PG (ref 27–31)
MCHC RBC AUTO-ENTMCNC: 32.6 G/DL (ref 32–36)
MCV RBC AUTO: 101 FL (ref 82–98)
MONOCYTES # BLD AUTO: 0.7 K/UL (ref 0.3–1)
MONOCYTES NFR BLD: 10.3 % (ref 4–15)
NEUTROPHILS # BLD AUTO: 3.3 K/UL (ref 1.8–7.7)
NEUTROPHILS NFR BLD: 48.7 % (ref 38–73)
NRBC BLD-RTO: 0 /100 WBC
PLATELET # BLD AUTO: 188 K/UL (ref 150–350)
PMV BLD AUTO: 12 FL (ref 9.2–12.9)
POTASSIUM SERPL-SCNC: 4 MMOL/L (ref 3.5–5.1)
PROT SERPL-MCNC: 7.9 G/DL (ref 6–8.4)
RBC # BLD AUTO: 4.03 M/UL (ref 4–5.4)
SODIUM SERPL-SCNC: 138 MMOL/L (ref 136–145)
WBC # BLD AUTO: 6.79 K/UL (ref 3.9–12.7)

## 2019-11-11 PROCEDURE — 85025 COMPLETE CBC W/AUTO DIFF WBC: CPT

## 2019-11-11 PROCEDURE — 3078F PR MOST RECENT DIASTOLIC BLOOD PRESSURE < 80 MM HG: ICD-10-PCS | Mod: CPTII,S$GLB,, | Performed by: FAMILY MEDICINE

## 2019-11-11 PROCEDURE — 3078F DIAST BP <80 MM HG: CPT | Mod: CPTII,S$GLB,, | Performed by: FAMILY MEDICINE

## 2019-11-11 PROCEDURE — 99999 PR PBB SHADOW E&M-EST. PATIENT-LVL III: CPT | Mod: PBBFAC,,, | Performed by: FAMILY MEDICINE

## 2019-11-11 PROCEDURE — 1101F PT FALLS ASSESS-DOCD LE1/YR: CPT | Mod: CPTII,S$GLB,, | Performed by: FAMILY MEDICINE

## 2019-11-11 PROCEDURE — 99214 PR OFFICE/OUTPT VISIT, EST, LEVL IV, 30-39 MIN: ICD-10-PCS | Mod: S$GLB,,, | Performed by: FAMILY MEDICINE

## 2019-11-11 PROCEDURE — 99214 OFFICE O/P EST MOD 30 MIN: CPT | Mod: S$GLB,,, | Performed by: FAMILY MEDICINE

## 2019-11-11 PROCEDURE — 3075F SYST BP GE 130 - 139MM HG: CPT | Mod: CPTII,S$GLB,, | Performed by: FAMILY MEDICINE

## 2019-11-11 PROCEDURE — 83036 HEMOGLOBIN GLYCOSYLATED A1C: CPT

## 2019-11-11 PROCEDURE — 99999 PR PBB SHADOW E&M-EST. PATIENT-LVL III: ICD-10-PCS | Mod: PBBFAC,,, | Performed by: FAMILY MEDICINE

## 2019-11-11 PROCEDURE — 36415 COLL VENOUS BLD VENIPUNCTURE: CPT | Mod: PO

## 2019-11-11 PROCEDURE — 3075F PR MOST RECENT SYSTOLIC BLOOD PRESS GE 130-139MM HG: ICD-10-PCS | Mod: CPTII,S$GLB,, | Performed by: FAMILY MEDICINE

## 2019-11-11 PROCEDURE — 1101F PR PT FALLS ASSESS DOC 0-1 FALLS W/OUT INJ PAST YR: ICD-10-PCS | Mod: CPTII,S$GLB,, | Performed by: FAMILY MEDICINE

## 2019-11-11 PROCEDURE — 80053 COMPREHEN METABOLIC PANEL: CPT

## 2019-11-11 RX ORDER — ATORVASTATIN CALCIUM 80 MG/1
80 TABLET, FILM COATED ORAL NIGHTLY
Qty: 90 TABLET | Refills: 1 | Status: SHIPPED | OUTPATIENT
Start: 2019-11-11 | End: 2020-01-17 | Stop reason: SDUPTHER

## 2019-11-11 RX ORDER — PANTOPRAZOLE SODIUM 40 MG/1
TABLET, DELAYED RELEASE ORAL
Qty: 90 TABLET | Refills: 1 | Status: SHIPPED | OUTPATIENT
Start: 2019-11-11 | End: 2020-04-28 | Stop reason: SDUPTHER

## 2019-11-11 RX ORDER — LOSARTAN POTASSIUM 50 MG/1
TABLET ORAL
Qty: 90 TABLET | Refills: 1 | Status: SHIPPED | OUTPATIENT
Start: 2019-11-11 | End: 2020-01-17 | Stop reason: SDUPTHER

## 2019-11-11 RX ORDER — GLIPIZIDE 5 MG/1
TABLET ORAL
Qty: 45 TABLET | Refills: 1 | Status: SHIPPED | OUTPATIENT
Start: 2019-11-11 | End: 2020-01-17 | Stop reason: SDUPTHER

## 2019-11-11 NOTE — PROGRESS NOTES
Danyelle Garcia    Chief Complaint   Patient presents with    Hypertension    Diabetes    Follow-up       History of Present Illness:   Ms. Garcia comes in today for 6-month diabetes and hypertension follow-up.  She is not fasting but has taken medication today.  She states she walks and sometimes monitors her diet.      She states she no longer performs glucose checks stating the last time she checked was 6-7 months ago when she was not at work.    She states she has been chewing nicotine gum prn to help her to stop smoking; she states her last cigarette smoke was approximately 1 month ago.  However, she states she continues to drink beer occasionally.    She states she continues to have occasional knee pain but stable today.    Otherwise, she states she feels okay today.  She denies having fever, chills, fatigue, appetite changes; shortness of breath, cough, wheezing; chest pain, palpitations, leg swelling; abdominal pain, nausea, vomiting, diarrhea, constipation; unusual urinary symptoms; polydipsia, polyuria, polyphagia; back pain; headache; anxiety, depression, homicidal or suicidal thoughts.    She also follows with Dr. Loomis, vascular surgeon for carotid artery disease surveillance, occasionally and reports has not seen him in some time.  But, she saw Dr. Ford, neurologist, on November 5, 2019 for surveillance of stenosis of left carotid artery with carotid doppler and sed rate ordered but prn follow up advised. She states she follows with Dr. Fredi Garces, cardiologist, for surveillance of nonobstructive CAD, moderate carotid artery disease, left bundle branch block, hypertension surveillance.     Labs:                    WBC                      7.91                12/28/2018                 HGB                      12.7                12/28/2018                 HCT                      37.7                12/28/2018                 PLT                      177                 12/28/2018                  CHOL                     238 (H)             05/07/2019                 TRIG                     69                  05/07/2019                 HDL                      77 (H)              05/07/2019                 ALT                      14                  12/28/2018                 AST                      15                  12/28/2018                 NA                       142                 12/28/2018                 K                        4.0                 12/28/2018                 CL                       106                 12/28/2018                 CREATININE               0.8                 12/28/2018                 BUN                      13                  12/28/2018                 CO2                      27                  12/28/2018                 TSH                      1.335               05/07/2019                 HGBA1C                   5.6                 05/07/2019        Vit D, 25-Hydroxy           27            05/07/2019                LDLCALC                  147.2               05/07/2019                Current Outpatient Medications   Medication Sig    aspirin (ECOTRIN) 81 MG EC tablet Take 81 mg by mouth daily as needed for Pain.    atorvastatin (LIPITOR) 80 MG tablet Take 1 tablet (80 mg total) by mouth every evening.    blood sugar diagnostic Strp 1 strip by Misc.(Non-Drug; Combo Route) route 2 (two) times daily.    calcium carbonate (OS-DAMIAN) 600 mg (1,500 mg) Tab Take 600 mg by mouth 2 (two) times daily with meals.    glipiZIDE (GLUCOTROL) 5 MG tablet TAKE 1/2 TABLET BY MOUTH WITH BREAKFAST    lancets (TRUEPLUS LANCETS) 30 gauge Misc Inject 1 lancet into the skin 2 (two) times daily.    losartan (COZAAR) 50 MG tablet TAKE 1 TABLET(50 MG) BY MOUTH EVERY DAY    nicotine, polacrilex, (NICORETTE) 2 mg Gum Take 1 each (2 mg total) by mouth as needed (Chew 1 piece of gum every 1 to 2 hours (maximum: 24 pieces/day) when urge to smoke occurs; to increase  chances of quitting, chew at least 9 pieces/day during the first 6 weeks).    pantoprazole (PROTONIX) 40 MG tablet TK 1 T PO  ONCE D    polyethylene glycol (GLYCOLAX) 17 gram/dose powder MIX AND DRINK 1 CAPFUL(17 GM) BY MOUTH IN 8 OZ OF LIQUID ONCE DAILY FOR 7 DAYS    TRUEPLUS LANCETS 28 gauge Misc USE TO TEST BLOOD SUGAR TWICE DAILY    blood glucose control, normal (ACCU-CHEK SMARTVIEW CONTRL SOL) Soln 1 each by Misc.(Non-Drug; Combo Route) route 2 (two) times daily.    blood-glucose meter kit Use as instructed     Review of Systems   Constitutional: Negative for activity change, appetite change, chills, fatigue and fever.        Weight 70 kg (154 lb 5.2 oz) at May 7, 2019 visit.   Eyes:        See history of present illness.   Respiratory: Negative for cough, shortness of breath and wheezing.    Cardiovascular: Negative for chest pain, palpitations and leg swelling.        See history of present illness.   Gastrointestinal: Negative for abdominal pain, constipation, diarrhea, nausea and vomiting.   Endocrine: Negative for polydipsia, polyphagia and polyuria.        See history of present illness.   Genitourinary: Negative for difficulty urinating.   Musculoskeletal: Positive for arthralgias. Negative for back pain.   Neurological: Negative for headaches.        See history of present illness.   Hematological:        See history of present illness.   Psychiatric/Behavioral: Negative for dysphoric mood and suicidal ideas. The patient is not nervous/anxious.         See history of present illness. Negative for homicidal ideas.       Objective:  Physical Exam   Constitutional: She is oriented to person, place, and time. She appears well-developed and well-nourished. No distress.   Elderly and pleasant.   Neck: Normal range of motion. Neck supple. No thyromegaly present.   Cardiovascular: Normal rate and regular rhythm.   Pulses:       Dorsalis pedis pulses are 1+ on the right side, and 1+ on the left side.         Posterior tibial pulses are 1+ on the right side, and 1+ on the left side.   Chronic decreased pedal pulses.   Pulmonary/Chest: Effort normal and breath sounds normal. No respiratory distress. She has no wheezes. Left breast exhibits no inverted nipple, no mass, no nipple discharge, no skin change and no tenderness. No breast swelling, tenderness or discharge.   Abdominal: Soft. Bowel sounds are normal. She exhibits no distension and no mass. There is no tenderness. There is no rebound and no guarding.   Genitourinary: No breast swelling, tenderness or discharge.   Musculoskeletal: Normal range of motion. She exhibits no edema or tenderness.   She is ambulatory without problems. Feet look okay without ulcerations or skin breaks. Nontender hips with full range of motion noted.   Feet:   Right Foot:   Protective Sensation: 5 sites tested. 5 sites sensed.   Skin Integrity: Negative for ulcer or skin breakdown.   Left Foot:   Protective Sensation: 5 sites tested. 5 sites sensed.   Skin Integrity: Negative for ulcer or skin breakdown.   Lymphadenopathy:     She has no cervical adenopathy.     She has no axillary adenopathy.   Neurological: She is alert and oriented to person, place, and time.   Skin: She is not diaphoretic.   Psychiatric: She has a normal mood and affect. Her behavior is normal. Judgment and thought content normal.   Vitals reviewed.      ASSESSMENT:  1. Hypertension associated with diabetes    2. Combined hyperlipidemia associated with type 2 diabetes mellitus    3. Hyperlipidemia, unspecified hyperlipidemia type    4. Coronary artery disease involving native coronary artery of native heart without angina pectoris    5. Stenosis of left carotid artery    6. Type 2 diabetes mellitus with peripheral artery disease    7. PAD (peripheral artery disease)    8. Diabetes mellitus due to underlying condition, controlled, with diabetic neuropathy, unspecified whether long term insulin use    9. Vitamin D  deficiency    10. Disorder of bone and cartilage    11. Constipation, unspecified constipation type    12. Gastroesophageal reflux disease, esophagitis presence not specified    13. History of left breast cancer    14. Tobacco use disorder        PLAN:  Danyelle was seen today for hypertension, diabetes and follow-up.    Diagnoses and all orders for this visit:    Hypertension associated with diabetes  -     Comprehensive metabolic panel; Future  -     CBC auto differential; Future  -     losartan (COZAAR) 50 MG tablet; TAKE 1 TABLET(50 MG) BY MOUTH EVERY DAY    Combined hyperlipidemia associated with type 2 diabetes mellitus    Hyperlipidemia, unspecified hyperlipidemia type  -     atorvastatin (LIPITOR) 80 MG tablet; Take 1 tablet (80 mg total) by mouth every evening.    Coronary artery disease involving native coronary artery of native heart without angina pectoris    Stenosis of left carotid artery    Type 2 diabetes mellitus with peripheral artery disease  -     Hemoglobin A1c; Future  -     glipiZIDE (GLUCOTROL) 5 MG tablet; TAKE 1/2 TABLET BY MOUTH WITH BREAKFAST    PAD (peripheral artery disease)    Diabetes mellitus due to underlying condition, controlled, with diabetic neuropathy, unspecified whether long term insulin use  -     Hemoglobin A1c; Future    Vitamin D deficiency    Disorder of bone and cartilage    Constipation, unspecified constipation type    Gastroesophageal reflux disease, esophagitis presence not specified  -     pantoprazole (PROTONIX) 40 MG tablet; TK 1 T PO  ONCE D    History of left breast cancer    Tobacco use disorder       Patient advised to call for results.  Continue current medications, follow low sodium, low cholesterol, low carb diet, daily walks.  Prescription refills as noted above.  Keep follow up with specialists.  Smoking cessation advised.  Follow up in about 6 months (around 5/7/2020) for physical.

## 2019-11-12 ENCOUNTER — TELEPHONE (OUTPATIENT)
Dept: RADIOLOGY | Facility: HOSPITAL | Age: 79
End: 2019-11-12

## 2019-11-12 LAB
ESTIMATED AVG GLUCOSE: 117 MG/DL (ref 68–131)
HBA1C MFR BLD HPLC: 5.7 % (ref 4–5.6)

## 2019-11-12 NOTE — PROGRESS NOTES
Low cbg of 42. Pt has eaten multiple times since blood work. Asymptomatic at present. Pt will follow up with Dr Chan

## 2019-11-13 ENCOUNTER — HOSPITAL ENCOUNTER (OUTPATIENT)
Dept: RADIOLOGY | Facility: HOSPITAL | Age: 79
Discharge: HOME | End: 2019-11-13
Attending: PSYCHIATRY & NEUROLOGY
Payer: MEDICARE

## 2019-11-13 DIAGNOSIS — I65.22 STENOSIS OF LEFT CAROTID ARTERY: ICD-10-CM

## 2019-11-13 PROCEDURE — 93880 US CAROTID BILATERAL: ICD-10-PCS | Mod: 26,,, | Performed by: RADIOLOGY

## 2019-11-13 PROCEDURE — 93880 EXTRACRANIAL BILAT STUDY: CPT | Mod: TC

## 2019-11-13 PROCEDURE — 93880 EXTRACRANIAL BILAT STUDY: CPT | Mod: 26,,, | Performed by: RADIOLOGY

## 2019-11-15 ENCOUNTER — TELEPHONE (OUTPATIENT)
Dept: FAMILY MEDICINE | Facility: CLINIC | Age: 79
End: 2019-11-15

## 2019-11-15 NOTE — TELEPHONE ENCOUNTER
Advise pt lab results are within acceptable range except her sugar that day was really low.  She should make sure she eats small meals throughout the day and notify me if low glucose levels (70 or less) are noted as she may need medication adjustment. Thanks.

## 2019-11-18 NOTE — TELEPHONE ENCOUNTER
"Patient notified-  lab results are within acceptable range except her sugar that day was really low.  She should make sure she eats small meals throughout the day and notify me if low glucose levels (70 or less) are noted as she may need medication adjustment.      stated "thanks for call"  "

## 2020-01-17 DIAGNOSIS — I15.2 HYPERTENSION ASSOCIATED WITH DIABETES: ICD-10-CM

## 2020-01-17 DIAGNOSIS — E11.51 TYPE 2 DIABETES MELLITUS WITH PERIPHERAL ARTERY DISEASE: ICD-10-CM

## 2020-01-17 DIAGNOSIS — E11.59 HYPERTENSION ASSOCIATED WITH DIABETES: ICD-10-CM

## 2020-01-17 DIAGNOSIS — E78.5 HYPERLIPIDEMIA, UNSPECIFIED HYPERLIPIDEMIA TYPE: ICD-10-CM

## 2020-01-17 RX ORDER — LOSARTAN POTASSIUM 50 MG/1
TABLET ORAL
Qty: 90 TABLET | Refills: 1 | Status: SHIPPED | OUTPATIENT
Start: 2020-01-17 | End: 2020-04-28 | Stop reason: SDUPTHER

## 2020-01-17 RX ORDER — GLIPIZIDE 5 MG/1
TABLET ORAL
Qty: 45 TABLET | Refills: 1 | Status: SHIPPED | OUTPATIENT
Start: 2020-01-17 | End: 2020-04-28 | Stop reason: SDUPTHER

## 2020-01-17 RX ORDER — ATORVASTATIN CALCIUM 80 MG/1
80 TABLET, FILM COATED ORAL NIGHTLY
Qty: 90 TABLET | Refills: 1 | Status: SHIPPED | OUTPATIENT
Start: 2020-01-17 | End: 2021-01-28 | Stop reason: SDUPTHER

## 2020-01-17 NOTE — TELEPHONE ENCOUNTER
----- Message from Clare Mercado sent at 1/17/2020  9:03 AM CST -----  Contact: Pt  Pt is calling the the staff regarding a refill Rx    losartan (COZAAR) 50 MG tablet //once a day// 90 day supply    atorvastatin (LIPITOR) 80 MG tablet / once a day // 90 day supply    glipiZIDE (GLUCOTROL) 5 MG tablet // once a day// 90 day supply      [id number] - [place] - [address] AT [facility]  [address] [address]  Phone: [phone] Fax: [phone]      Thanks

## 2020-04-28 DIAGNOSIS — E11.59 HYPERTENSION ASSOCIATED WITH DIABETES: ICD-10-CM

## 2020-04-28 DIAGNOSIS — K21.9 GASTROESOPHAGEAL REFLUX DISEASE, ESOPHAGITIS PRESENCE NOT SPECIFIED: ICD-10-CM

## 2020-04-28 DIAGNOSIS — E11.51 TYPE 2 DIABETES MELLITUS WITH PERIPHERAL ARTERY DISEASE: ICD-10-CM

## 2020-04-28 DIAGNOSIS — I15.2 HYPERTENSION ASSOCIATED WITH DIABETES: ICD-10-CM

## 2020-04-28 RX ORDER — GLIPIZIDE 5 MG/1
TABLET ORAL
Qty: 45 TABLET | Refills: 0 | Status: SHIPPED | OUTPATIENT
Start: 2020-04-28 | End: 2020-09-01 | Stop reason: SDUPTHER

## 2020-04-28 RX ORDER — LOSARTAN POTASSIUM 50 MG/1
TABLET ORAL
Qty: 90 TABLET | Refills: 0 | Status: SHIPPED | OUTPATIENT
Start: 2020-04-28 | End: 2020-09-01 | Stop reason: SDUPTHER

## 2020-04-28 RX ORDER — PANTOPRAZOLE SODIUM 40 MG/1
TABLET, DELAYED RELEASE ORAL
Qty: 90 TABLET | Refills: 0 | Status: SHIPPED | OUTPATIENT
Start: 2020-04-28 | End: 2020-07-30

## 2020-04-28 NOTE — TELEPHONE ENCOUNTER
----- Message from Madisyn Shah sent at 4/28/2020  1:35 PM CDT -----  Contact: pt  Please call pt @ [phone] regarding medication, pt have some questions

## 2020-04-29 ENCOUNTER — TELEPHONE (OUTPATIENT)
Dept: FAMILY MEDICINE | Facility: CLINIC | Age: 80
End: 2020-04-29

## 2020-04-29 NOTE — TELEPHONE ENCOUNTER
----- Message from Raquel Roa sent at 4/29/2020 11:05 AM CDT -----  Pt is requesting a call back regarding scheduling an appt sooner then 08/13. Please call pt back at [phone]

## 2020-06-10 ENCOUNTER — PATIENT OUTREACH (OUTPATIENT)
Dept: ADMINISTRATIVE | Facility: HOSPITAL | Age: 80
End: 2020-06-10

## 2020-06-10 DIAGNOSIS — I15.2 HYPERTENSION ASSOCIATED WITH DIABETES: ICD-10-CM

## 2020-06-10 DIAGNOSIS — Z12.11 ENCOUNTER FOR SCREENING COLONOSCOPY: Primary | ICD-10-CM

## 2020-06-10 DIAGNOSIS — E11.59 HYPERTENSION ASSOCIATED WITH DIABETES: ICD-10-CM

## 2020-06-10 NOTE — PROGRESS NOTES
PreVisit Chart Audit Perfomed   Labs Ordered Colonoscopy Orders Placed     Called pt to Schedule appt Wrong Number in System       Dena LOPEZ LPN Care Coordinator  Care Coordination Department  [facility]  [phone]

## 2020-06-11 ENCOUNTER — TELEPHONE (OUTPATIENT)
Dept: ENDOSCOPY | Facility: HOSPITAL | Age: 80
End: 2020-06-11

## 2020-06-11 ENCOUNTER — TELEPHONE (OUTPATIENT)
Dept: INTERNAL MEDICINE | Facility: CLINIC | Age: 80
End: 2020-06-11

## 2020-06-11 RX ORDER — SODIUM, POTASSIUM,MAG SULFATES 17.5-3.13G
1 SOLUTION, RECONSTITUTED, ORAL ORAL DAILY
Qty: 1 KIT | Refills: 0 | Status: SHIPPED | OUTPATIENT
Start: 2020-06-11 | End: 2020-06-13

## 2020-06-11 NOTE — TELEPHONE ENCOUNTER
COVID Screening     1. Have you had a fever in the last 7 days or have you used fever reducing medicines for a fever in the last 7 days?  no    2. Are you experiencing shortness of breath, cough, muscle aches, loss of taste or loss of smell?  no    3. Are you residing with anyone who has tested positive for Covid?  no    If answered yes to any of the above questions, the pt must be scheduled for an appointment with their PCP.    A message also needs to be sent to the endoscopist to ensure the patient gets rescheduled at a later date.     ENDO screening    1. Have you been admitted overnight to the hospital in the past 3 months? no   If yes, schedule an appointment with PCP before scheduling endoscopic procedure.     2. Have you had a stent placed in the last 12 months? no   If yes, for a screening visit, cancel and message the ordering provider.  The patient will need a new order when the time is appropriate.     3. Have you had a stroke or heart attack in the past 6 months? no   If yes, cancel and refer patient to ordering provider for clearance, also message ordering provider to inform.     4. Have you had any chest pain in the past 3 months? no   If yes, Have you been evaluated by your PCP and/or cardiologist and it was determined to not be heart related? not applicable   If No, Pt needs to be seen by PCP or Cardiologist .  Pt can be scheduled once clearance obtained by either of those providers.     5. Do you take prescription weight loss medications?  no   If yes, must stop for 2 weeks prior to procedure.     6. Have you been diagnosed with diverticulitis within the past 3 months? no   If yes, must have been seen by GI within the last 3 months, if not schedule with [facility].    If pt has been seen by GI, schedule procedure 8-12 weeks post antibiotic treatment.     7. Are you on Dialysis? no  If yes, schedule procedure for the day AFTER dialysis.  Appt time should be 9am or later, patient arrival time is 2 hours  "prior.  Nulytely or    miralax prep for all patients with kidney disease.     8. Are you diabetic?  yes   If yes, schedule morning appt. Advise pt to hold all diabetic meds day of procedure.     9. If pt is older than 80 years of age and HAS NOT been seen by GI or PCP within the last 6 months, needs appt with GI [facility].   If pt has been seen by the GI provider or PCP within the past 6  months AND meets criteria, schedule procedure AND send message to the endoscopist.     10. Is patient on a "high risk" medication (blood thinner/antiplatelet agent)?  no   If yes, has cardiac clearance been obtained within the last 60 days? N/A   If no, a new clearance needs to be obtained.       I have reviewed the last colonoscopy for recommendations regarding next procedure bowel prep.  yes  I have reviewed medications and allergies.  yes  I have verified the pharmacy information and appropriate prep sent if needed. yes  Prep instructions have been mailed or sent to portal per patient request. yes    If answers yes to any of the following, schedule at [facility] ONLY. If No, OK for either location.     Is BMI over 45?   Any complaints of chest pain, new onset or at rest?  Does pt have an AICD?  Is there a diagnosis of heart failure?  Does patient have an insulin pump?  If procedure for esophageal banding?  "

## 2020-06-11 NOTE — TELEPHONE ENCOUNTER
----- Message from Vidhi Hogue sent at 6/11/2020  2:57 PM CDT -----  Contact: pt  Pt needs orders for COVID-19 testing for upcoming colonoscopy ... Call back: [phone]  Or [phone]

## 2020-06-11 NOTE — TELEPHONE ENCOUNTER
Spoke with pt, she stated she has appt scheduled on Aug 4th for colonoscopy. She stated she was told she will need covid testing prior to the procedure. Please review and advise. Pt wants to know whats the soonest she can have it done?

## 2020-06-11 NOTE — TELEPHONE ENCOUNTER
Pt called to verify appt on 08-. Tried assisting pt. However, she wanted to speak with [facility]. Informed pt that I would send a message for a return call. [email or web address]

## 2020-06-12 ENCOUNTER — TELEPHONE (OUTPATIENT)
Dept: ENDOSCOPY | Facility: HOSPITAL | Age: 80
End: 2020-06-12

## 2020-06-12 NOTE — TELEPHONE ENCOUNTER
Spoke with patient regarding procedure scheduled for 8/4/2020.  Patient requested a sooner appointment on a Tuesday.  Rescheduled procedure to 7/7/2020 at the [facility] with dr leigh.  Patient stated she could not get a  2 days d/t their work schedule, rapid covid-19 requested.  New instructions mailed to address on file and patient verbalized understanding of the above.

## 2020-06-12 NOTE — TELEPHONE ENCOUNTER
Advise pt the GI dept will order Covid-19 test for her closer to time of having colonoscopy. Thanks.

## 2020-06-24 ENCOUNTER — LAB VISIT (OUTPATIENT)
Dept: LAB | Facility: HOSPITAL | Age: 80
End: 2020-06-24
Payer: MEDICARE

## 2020-06-24 ENCOUNTER — OFFICE VISIT (OUTPATIENT)
Dept: FAMILY MEDICINE | Facility: CLINIC | Age: 80
End: 2020-06-24
Payer: MEDICARE

## 2020-06-24 VITALS
HEART RATE: 66 BPM | TEMPERATURE: 99 F | BODY MASS INDEX: 27.2 KG/M2 | OXYGEN SATURATION: 97 % | HEIGHT: 64 IN | DIASTOLIC BLOOD PRESSURE: 70 MMHG | WEIGHT: 159.31 LBS | SYSTOLIC BLOOD PRESSURE: 136 MMHG

## 2020-06-24 DIAGNOSIS — E11.69 COMBINED HYPERLIPIDEMIA ASSOCIATED WITH TYPE 2 DIABETES MELLITUS: ICD-10-CM

## 2020-06-24 DIAGNOSIS — Z78.0 POSTMENOPAUSAL: ICD-10-CM

## 2020-06-24 DIAGNOSIS — Z00.00 ANNUAL PHYSICAL EXAM: Primary | ICD-10-CM

## 2020-06-24 DIAGNOSIS — E78.2 COMBINED HYPERLIPIDEMIA ASSOCIATED WITH TYPE 2 DIABETES MELLITUS: ICD-10-CM

## 2020-06-24 DIAGNOSIS — R41.3 MEMORY DEFICIT: ICD-10-CM

## 2020-06-24 DIAGNOSIS — I25.10 CORONARY ARTERY DISEASE INVOLVING NATIVE CORONARY ARTERY OF NATIVE HEART WITHOUT ANGINA PECTORIS: Chronic | ICD-10-CM

## 2020-06-24 DIAGNOSIS — B37.2 CANDIDIASIS OF SKIN: ICD-10-CM

## 2020-06-24 DIAGNOSIS — K63.5 BENIGN COLON POLYP: ICD-10-CM

## 2020-06-24 DIAGNOSIS — C50.912 MALIGNANT NEOPLASM OF LEFT BREAST IN FEMALE, ESTROGEN RECEPTOR POSITIVE, UNSPECIFIED SITE OF BREAST: ICD-10-CM

## 2020-06-24 DIAGNOSIS — E11.59 HYPERTENSION ASSOCIATED WITH DIABETES: ICD-10-CM

## 2020-06-24 DIAGNOSIS — I15.2 HYPERTENSION ASSOCIATED WITH DIABETES: ICD-10-CM

## 2020-06-24 DIAGNOSIS — I73.9 PAD (PERIPHERAL ARTERY DISEASE): ICD-10-CM

## 2020-06-24 DIAGNOSIS — E11.51 TYPE 2 DIABETES MELLITUS WITH PERIPHERAL ARTERY DISEASE: ICD-10-CM

## 2020-06-24 DIAGNOSIS — E08.40 DIABETES MELLITUS DUE TO UNDERLYING CONDITION, CONTROLLED, WITH DIABETIC NEUROPATHY, UNSPECIFIED WHETHER LONG TERM INSULIN USE: ICD-10-CM

## 2020-06-24 DIAGNOSIS — Z17.0 MALIGNANT NEOPLASM OF LEFT BREAST IN FEMALE, ESTROGEN RECEPTOR POSITIVE, UNSPECIFIED SITE OF BREAST: ICD-10-CM

## 2020-06-24 DIAGNOSIS — E55.9 VITAMIN D DEFICIENCY: ICD-10-CM

## 2020-06-24 DIAGNOSIS — M89.9 DISORDER OF BONE AND CARTILAGE: ICD-10-CM

## 2020-06-24 DIAGNOSIS — I65.22 STENOSIS OF LEFT CAROTID ARTERY: ICD-10-CM

## 2020-06-24 DIAGNOSIS — K21.9 GASTROESOPHAGEAL REFLUX DISEASE, ESOPHAGITIS PRESENCE NOT SPECIFIED: ICD-10-CM

## 2020-06-24 DIAGNOSIS — M94.9 DISORDER OF BONE AND CARTILAGE: ICD-10-CM

## 2020-06-24 DIAGNOSIS — F17.200 TOBACCO USE DISORDER: ICD-10-CM

## 2020-06-24 LAB
BASOPHILS # BLD AUTO: 0.08 K/UL (ref 0–0.2)
BASOPHILS NFR BLD: 1 % (ref 0–1.9)
DIFFERENTIAL METHOD: ABNORMAL
EOSINOPHIL # BLD AUTO: 0.2 K/UL (ref 0–0.5)
EOSINOPHIL NFR BLD: 1.9 % (ref 0–8)
ERYTHROCYTE [DISTWIDTH] IN BLOOD BY AUTOMATED COUNT: 13.8 % (ref 11.5–14.5)
HCT VFR BLD AUTO: 39.9 % (ref 37–48.5)
HGB BLD-MCNC: 12.7 G/DL (ref 12–16)
IMM GRANULOCYTES # BLD AUTO: 0.05 K/UL (ref 0–0.04)
IMM GRANULOCYTES NFR BLD AUTO: 0.6 % (ref 0–0.5)
LYMPHOCYTES # BLD AUTO: 2.6 K/UL (ref 1–4.8)
LYMPHOCYTES NFR BLD: 32.3 % (ref 18–48)
MCH RBC QN AUTO: 32.1 PG (ref 27–31)
MCHC RBC AUTO-ENTMCNC: 31.8 G/DL (ref 32–36)
MCV RBC AUTO: 101 FL (ref 82–98)
MONOCYTES # BLD AUTO: 0.9 K/UL (ref 0.3–1)
MONOCYTES NFR BLD: 11 % (ref 4–15)
NEUTROPHILS # BLD AUTO: 4.2 K/UL (ref 1.8–7.7)
NEUTROPHILS NFR BLD: 53.2 % (ref 38–73)
NRBC BLD-RTO: 0 /100 WBC
PLATELET # BLD AUTO: 176 K/UL (ref 150–350)
PMV BLD AUTO: 12.5 FL (ref 9.2–12.9)
RBC # BLD AUTO: 3.96 M/UL (ref 4–5.4)
WBC # BLD AUTO: 7.93 K/UL (ref 3.9–12.7)

## 2020-06-24 PROCEDURE — 99406 PR TOBACCO USE CESSATION INTERMEDIATE 3-10 MINUTES: ICD-10-PCS | Mod: HCNC,S$GLB,, | Performed by: FAMILY MEDICINE

## 2020-06-24 PROCEDURE — 85025 COMPLETE CBC W/AUTO DIFF WBC: CPT | Mod: HCNC

## 2020-06-24 PROCEDURE — 82306 VITAMIN D 25 HYDROXY: CPT | Mod: HCNC

## 2020-06-24 PROCEDURE — 80053 COMPREHEN METABOLIC PANEL: CPT | Mod: HCNC

## 2020-06-24 PROCEDURE — 99397 PR PREVENTIVE VISIT,EST,65 & OVER: ICD-10-PCS | Mod: 25,HCNC,S$GLB, | Performed by: FAMILY MEDICINE

## 2020-06-24 PROCEDURE — 99999 PR PBB SHADOW E&M-EST. PATIENT-LVL V: ICD-10-PCS | Mod: PBBFAC,HCNC,, | Performed by: FAMILY MEDICINE

## 2020-06-24 PROCEDURE — 99397 PER PM REEVAL EST PAT 65+ YR: CPT | Mod: 25,HCNC,S$GLB, | Performed by: FAMILY MEDICINE

## 2020-06-24 PROCEDURE — 99499 UNLISTED E&M SERVICE: CPT | Mod: HCNC,S$GLB,, | Performed by: FAMILY MEDICINE

## 2020-06-24 PROCEDURE — 36415 COLL VENOUS BLD VENIPUNCTURE: CPT | Mod: HCNC,PO

## 2020-06-24 PROCEDURE — 80061 LIPID PANEL: CPT | Mod: HCNC

## 2020-06-24 PROCEDURE — 3078F PR MOST RECENT DIASTOLIC BLOOD PRESSURE < 80 MM HG: ICD-10-PCS | Mod: HCNC,CPTII,S$GLB, | Performed by: FAMILY MEDICINE

## 2020-06-24 PROCEDURE — 3078F DIAST BP <80 MM HG: CPT | Mod: HCNC,CPTII,S$GLB, | Performed by: FAMILY MEDICINE

## 2020-06-24 PROCEDURE — 3075F PR MOST RECENT SYSTOLIC BLOOD PRESS GE 130-139MM HG: ICD-10-PCS | Mod: HCNC,CPTII,S$GLB, | Performed by: FAMILY MEDICINE

## 2020-06-24 PROCEDURE — 99999 PR PBB SHADOW E&M-EST. PATIENT-LVL V: CPT | Mod: PBBFAC,HCNC,, | Performed by: FAMILY MEDICINE

## 2020-06-24 PROCEDURE — 84443 ASSAY THYROID STIM HORMONE: CPT | Mod: HCNC

## 2020-06-24 PROCEDURE — 99406 BEHAV CHNG SMOKING 3-10 MIN: CPT | Mod: HCNC,S$GLB,, | Performed by: FAMILY MEDICINE

## 2020-06-24 PROCEDURE — 83036 HEMOGLOBIN GLYCOSYLATED A1C: CPT | Mod: HCNC

## 2020-06-24 PROCEDURE — 99499 RISK ADDL DX/OHS AUDIT: ICD-10-PCS | Mod: HCNC,S$GLB,, | Performed by: FAMILY MEDICINE

## 2020-06-24 PROCEDURE — 3075F SYST BP GE 130 - 139MM HG: CPT | Mod: HCNC,CPTII,S$GLB, | Performed by: FAMILY MEDICINE

## 2020-06-24 RX ORDER — GABAPENTIN 100 MG/1
CAPSULE ORAL
COMMUNITY
Start: 2020-06-22 | End: 2021-01-28

## 2020-06-24 RX ORDER — NYSTATIN 100000 U/G
CREAM TOPICAL 2 TIMES DAILY PRN
Qty: 30 G | Refills: 1 | Status: SHIPPED | OUTPATIENT
Start: 2020-06-24 | End: 2022-08-26

## 2020-06-24 NOTE — PROGRESS NOTES
HISTORY OF PRESENT ILLNESS: Ms. Garcia comes in today non fasting and with taking medications for annual wellness exam.      END OF LIFE DECISION: She has a living will and does not desire life support.     She states she does not perform home glucose checks.     She continues to follow with hematologist/oncologist Dr. Finley for breast cancer surveillance and saw YUNG Foley with Hematology/Oncology on January 2, 2019 for surveillance of left breast cancer with 1-year follow-up advised.     She saw Dr. Fredi Garces, cardiologist, on January 8, 2020 for chest pain with nonobstructive CAD, moderate carotid artery disease, left bundle branch block, hypertension surveillance.                          She saw Dr. Mason, neurologist, on November 5, 2019 for Stenosis of left carotid artery. However, she states she follows with NP Hanny Zepeda with neurology with [facility] with last visit on                                    June 22, 2020 and scheduled to see Dr. Gupta on July 10, 2020.                     She continues to smoke but sporadically and states she continues to work on quitting.    Current Outpatient Medications   Medication Sig    aspirin (ECOTRIN) 81 MG EC tablet Take 81 mg by mouth daily as needed for Pain.    atorvastatin (LIPITOR) 80 MG tablet Take 1 tablet (80 mg total) by mouth every evening.    blood sugar diagnostic Strp 1 strip by Misc.(Non-Drug; Combo Route) route 2 (two) times daily.    calcium carbonate (OS-DAMIAN) 600 mg (1,500 mg) Tab Take 600 mg by mouth 2 (two) times daily with meals.    gabapentin (NEURONTIN) 100 MG capsule     glipiZIDE (GLUCOTROL) 5 MG tablet TAKE 1/2 TABLET BY MOUTH WITH BREAKFAST    lancets (TRUEPLUS LANCETS) 30 gauge Misc Inject 1 lancet into the skin 2 (two) times daily.    losartan (COZAAR) 50 MG tablet TAKE 1 TABLET(50 MG) BY MOUTH EVERY DAY    nicotine, polacrilex, (NICORETTE) 2 mg Gum Take 1 each (2 mg total) by mouth as needed (Chew 1 piece of gum  every 1 to 2 hours (maximum: 24 pieces/day) when urge to smoke occurs; to increase chances of quitting, chew at least 9 pieces/day during the first 6 weeks).    pantoprazole (PROTONIX) 40 MG tablet TK 1 T PO  ONCE D    polyethylene glycol (GLYCOLAX) 17 gram/dose powder MIX AND DRINK 1 CAPFUL(17 GM) BY MOUTH IN 8 OZ OF LIQUID ONCE DAILY FOR 7 DAYS    TRUEPLUS LANCETS 28 gauge Misc USE TO TEST BLOOD SUGAR TWICE DAILY    blood glucose control, normal (ACCU-CHEK SMARTVIEW CONTRL SOL) Soln 1 each by Misc.(Non-Drug; Combo Route) route 2 (two) times daily.    blood-glucose meter kit Use as instructed      SCREENINGS:   Cholesterol: May 7, 2019.  FFS/Colonoscopy: August 4, 2015 - benign colon polyp, diverticulosis; repeat in 3 years. Scheduled for July 7, 2020.   Mammogram (with Dr. Finley): April 23, 2018 - okay.   GYN Exam (Breast exam) with Dr. Finley: July 12, 2018.   Dexa Scan: March 16, 2018 - osteopenia.   Eye Exam: July 19, 2017 with Dr. Cruz. She declines.  Dental Exam: Years ago per patient. She wears top dentures only. She declines.  PPD: Negative in the past.  Immunizations: Td/Tdap - less than 10 years ago per patient.  Gardasil - N./A.  Zostavax - June 14, 2012.  Shingrix - Never. Advised insurance covered benefit on at local pharmacy.  Pneumovax - March 26, 2014.     Prevnar-13 shot - March 29, 2016.    Seasonal Flu - October 9, 2019.    ROS:  GENERAL: Denies fever, chills, fatigue or unusual weight change. Appetite good. Weight 72 kg (158 lb 13.5 oz) at November 11, 2019 visit. Reports walks very little . Some times monitors diet. States she feels flushed today.   SKIN: Denies rashes, itching, changes in mole, color or texture of skin or easy bruising. Except reports itchy rash in groin areas.  HEAD: Denies headaches or recent head trauma.   EYES: Denies change in vision, pain, diplopia, redness or discharge. Wears glasses.  EARS: Denies ear pain, discharge, vertigo or decreased hearing.   NOSE:  "Denies loss of smell, epistaxis or rhinitis.  MOUTH & THROAT: Denies hoarseness or change in voice. Denies excessive gum bleeding or mouth sores. Denies sore throat.  NODES: Denies swollen glands.  CHEST: Denies BRITO, wheezing, cough, or sputum production.  CARDIOVASCULAR: Denies PND, chest pain, orthopnea or reduced exercise tolerance. Denies palpitations. See history of present illness.  ABDOMEN: Denies constipation, diarrhea, nausea, vomiting, abdominal pain, or blood in stool.  URINARY: Denies flank pain, dysuria or hematuria.  GENITOURINARY: Denies flank pain, dysuria, frequency or hematuria. Performs monthly breast self exams. See history of present illness.  ENDOCRINE: Denies thyroid problems. See history of present illness.  HEME/LYMPH: Denies bleeding problems.  PERIPHERAL VASCULAR:Denies claudication or cyanosis.  MUSCULOSKELETAL: Denies pain, stiffness, edema.   NEUROLOGIC: Denies history of seizures, tremors, paralysis, alteration of gait or coordination. She reports having stable, chronic memory issue. See history of present illness.  PSYCHIATRIC: Denies mood swings, depression, anxiety, homicidal or suicidal thoughts. Denies sleep problems.    PE:   VS: /70 (BP Location: Right arm, Patient Position: Sitting, BP Method: Large (Manual))   Pulse 66   Temp 98.7 °F (37.1 °C) (Oral)   Ht 5' 4" (1.626 m)   Wt 72.3 kg (159 lb 4.5 oz)   SpO2 97%   BMI 27.34 kg/m²   APPEARANCE: Well nourished, well developed female, overweight, elderly and pleasant, alert and oriented in no acute distress.   HEAD: Nontender. Full range of motion.  EYES: PERRL, conjunctiva pink, lids no edema. She wears glasses.   EARS: External canal patent, no swelling or redness. TM's shiny and clear.  NOSE: Mucosa and turbinates pink, not swollen. No discharge. Nontender sinuses.  THROAT: No pharyngeal erythema or exudate. No stridor. She has top dentures.   NECK: Supple, no mass, thyroid not enlarged.  NODES: No cervical, axillary " or inguinal lymph node enlargement.  CHEST: Normal respiratory effort. Lungs clear to auscultation.  CARDIOVASCULAR: Bradycardia today with normal S1, S2. No rubs, murmurs or gallops. PMI not displaced. Left carotid bruit not heard today. Feet look okay without ulcerations or skin breaks. Chronic slightly decreased pedal pulses palpable bilaterally. No edema.  ABDOMEN: Bowel sounds present. Not distended. Soft. No tenderness, masses or organomegaly.  BREAST EXAM: Symmetrical, no external lesions, no discharge, no masses palpated.   PELVIC EXAM: Bimanual examination not examined as patient has had JACQUELINE/BSO due to noncancerous reasons.   RECTAL EXAM: No external hemorrhoids or anal fissures. Heme-negative stool in the rectal vault.  MUSCULOSKELETAL: No joint deformities or stiffness. She is ambulatory without problems.  SKIN: No rashes or suspicious lesions, normal color and turgor except hyperpigmented rash at right > left groin areas - consistent with candidiasis.  NEUROLOGIC: Cranial Nerves: II-XII grossly intact. DTR's: Knees, Ankles 2+ and equal bilaterally. Monofilament test unremarkable. Gait & Posture: Normal gait and fine motion.  PSYCHIATRIC: Patient alert, oriented x 3. Mood/Affect normal without acute anxiety depression noted. Judgment/insight good as she makes appropriate decisions during today's examination but slight decreased memory deficit noted.    Protective Sensation (w/ 10 gram monofilament):  Right: Intact  Left: Intact    Visual Inspection:  Normal -  Bilateral    Pedal Pulses:   Right: Diminshed - chronic.  Left: Diminshed - chronic.    Posterior tibialis:   Right:Diminshed - chronic.  Left: Diminshed - chronic.     ASSESSMENT:    ICD-10-CM ICD-9-CM    1. Annual physical exam  Z00.00 V70.0    2. Hypertension associated with diabetes  E11.59 250.80 Comprehensive metabolic panel    I10 401.9 Lipid Panel      CBC auto differential      TSH   3. Combined hyperlipidemia associated with type 2  diabetes mellitus  E11.69 250.80 Comprehensive metabolic panel    E78.2 272.2 Lipid Panel   4. Coronary artery disease involving native coronary artery of native heart without angina pectoris  I25.10 414.01    5. Stenosis of left carotid artery  I65.22 433.10    6. Type 2 diabetes mellitus with peripheral artery disease  E11.51 250.70 Protein / creatinine ratio, urine     443.81 Hemoglobin A1C   7. Diabetes mellitus due to underlying condition, controlled, with diabetic neuropathy, unspecified whether long term insulin use  E08.40 249.60      357.2    8. PAD (peripheral artery disease)  I73.9 443.9    9. Vitamin D deficiency  E55.9 268.9 Vitamin D   10. Disorder of bone and cartilage  M89.9 733.90     M94.9     11. Tobacco use disorder  F17.200 305.1    12. Gastroesophageal reflux disease, esophagitis presence not specified  K21.9 530.81    13. Benign colon polyp  K63.5 211.3    14. Memory deficit  R41.3 780.93    15. Malignant neoplasm of left breast in female, estrogen receptor positive, unspecified site of breast  C50.912 174.9 Ambulatory referral/consult to Hematology / Oncology    Z17.0 V86.0    16. Postmenopausal  Z78.0 V49.81    17. Candidiasis of skin  B37.2 112.3 nystatin (MYCOSTATIN) cream       PLAN:  1. Age-appropriate counseling-appropriate low-sodium, low-cholesterol, low carbohydrate diet and exercise daily, monthly breast self exam, annual wellness examination.   2. Patient advised to call for results.  3. Continue current medications.  4. Add Nystatin cream - apply twice daily for up to 3 weeks as directed; medication precautions discussed with patient.  5. Annual eye and dental examinations; I offered to schedule patient for eye appointment but she declined.  6. Keep follow up with specialists.  7. Flu shot this fall.   8. Smoking cessation advised. 3- 5 minutes spent in counseling and discussion regarding smoking cessation, risks, etc; patient does not desires medication for smoking cessation but  states she plans to continue to work on quitting.            9. Follow up in about 6 months (around 12/24/2020) for hypertension and diabetes follow up.

## 2020-06-25 ENCOUNTER — TELEPHONE (OUTPATIENT)
Dept: HEMATOLOGY/ONCOLOGY | Facility: CLINIC | Age: 80
End: 2020-06-25

## 2020-06-25 LAB
25(OH)D3+25(OH)D2 SERPL-MCNC: 18 NG/ML (ref 30–96)
ALBUMIN SERPL BCP-MCNC: 3.9 G/DL (ref 3.5–5.2)
ALP SERPL-CCNC: 114 U/L (ref 55–135)
ALT SERPL W/O P-5'-P-CCNC: 11 U/L (ref 10–44)
ANION GAP SERPL CALC-SCNC: 12 MMOL/L (ref 8–16)
AST SERPL-CCNC: 18 U/L (ref 10–40)
BILIRUB SERPL-MCNC: 0.4 MG/DL (ref 0.1–1)
BUN SERPL-MCNC: 15 MG/DL (ref 8–23)
CALCIUM SERPL-MCNC: 10.1 MG/DL (ref 8.7–10.5)
CHLORIDE SERPL-SCNC: 104 MMOL/L (ref 95–110)
CHOLEST SERPL-MCNC: 209 MG/DL (ref 120–199)
CHOLEST/HDLC SERPL: 3.7 {RATIO} (ref 2–5)
CO2 SERPL-SCNC: 23 MMOL/L (ref 23–29)
CREAT SERPL-MCNC: 0.8 MG/DL (ref 0.5–1.4)
EST. GFR  (AFRICAN AMERICAN): >60 ML/MIN/1.73 M^2
EST. GFR  (NON AFRICAN AMERICAN): >60 ML/MIN/1.73 M^2
ESTIMATED AVG GLUCOSE: 111 MG/DL (ref 68–131)
GLUCOSE SERPL-MCNC: 37 MG/DL (ref 70–110)
HBA1C MFR BLD HPLC: 5.5 % (ref 4–5.6)
HDLC SERPL-MCNC: 57 MG/DL (ref 40–75)
HDLC SERPL: 27.3 % (ref 20–50)
LDLC SERPL CALC-MCNC: 137.2 MG/DL (ref 63–159)
NONHDLC SERPL-MCNC: 152 MG/DL
POTASSIUM SERPL-SCNC: 3.3 MMOL/L (ref 3.5–5.1)
PROT SERPL-MCNC: 8 G/DL (ref 6–8.4)
SODIUM SERPL-SCNC: 139 MMOL/L (ref 136–145)
TRIGL SERPL-MCNC: 74 MG/DL (ref 30–150)
TSH SERPL DL<=0.005 MIU/L-ACNC: 1.22 UIU/ML (ref 0.4–4)

## 2020-06-25 NOTE — TELEPHONE ENCOUNTER
Spoke with patient who wanted to r/s her appointment to 7/22.  Appointment rescheduled. Patient acknowledged. Call ended well.

## 2020-07-05 DIAGNOSIS — E55.9 VITAMIN D DEFICIENCY: Primary | ICD-10-CM

## 2020-07-05 RX ORDER — ERGOCALCIFEROL 1.25 MG/1
50000 CAPSULE ORAL
Qty: 12 CAPSULE | Refills: 1 | Status: SHIPPED | OUTPATIENT
Start: 2020-07-05 | End: 2020-12-21

## 2020-07-06 ENCOUNTER — ANESTHESIA EVENT (OUTPATIENT)
Dept: ENDOSCOPY | Facility: HOSPITAL | Age: 80
End: 2020-07-06
Payer: MEDICARE

## 2020-07-06 ENCOUNTER — TELEPHONE (OUTPATIENT)
Dept: ENDOSCOPY | Facility: HOSPITAL | Age: 80
End: 2020-07-06

## 2020-07-06 NOTE — ANESTHESIA PREPROCEDURE EVALUATION
07/06/2020  Danyelle Garcia is a 79 y.o., female.    Anesthesia Evaluation    I have reviewed the Patient Summary Reports.    I have reviewed the Nursing Notes. I have reviewed the NPO Status.   I have reviewed the Medications.     Review of Systems  Anesthesia Hx:  No problems with previous Anesthesia  Denies Family Hx of Anesthesia complications.   Denies Personal Hx of Anesthesia complications.   Social:  Smoker, Social Alcohol Use    Hematology/Oncology:  Hematology Normal       -- Cancer in past history:  Breast   Cardiovascular:   Hypertension CAD   PVD hyperlipidemia ECG has been reviewed. Non-obstructing diffuse CAD per [facility] 2018, medical management, asymptomatic.  Echo with mild cardiomyopathy, EF 48%, mild MR.  Moderate carotid stenosis - 50% on left, asymptomatic.   Pulmonary:  Pulmonary Normal    Renal/:  Renal/ Normal     Hepatic/GI:   GERD    Neurological:   Headaches    Endocrine:   Diabetes, poorly controlled, type 2    Psych:  Psychiatric Normal           Physical Exam  General:  Well nourished    Airway/Jaw/Neck:  Airway Findings: Mouth Opening: Normal Tongue: Normal  General Airway Assessment: Adult  Mallampati: II  TM Distance: Normal, at least 6 cm  Jaw/Neck Findings:  Neck ROM: Normal ROM  Neck Findings:     Eyes/Ears/Nose:  Eyes/Ears/Nose Findings:    Dental:  Dental Findings: In tact, Periodontal disease, Mild   Chest/Lungs:  Chest/Lungs Findings: Clear to auscultation, Normal Respiratory Rate     Heart/Vascular:  Heart Findings: Rate: Normal  Rhythm: Regular Rhythm  Sounds: Normal  Heart murmur: negative Vascular Findings: Normal    Abdomen:  Abdomen Findings: Normal    Musculoskeletal:  Musculoskeletal Findings: Normal   Skin:  Skin Findings: Normal    Mental Status:  Mental Status Findings:  Alert and Oriented         Anesthesia Plan  Type of Anesthesia, risks & benefits  discussed:  Anesthesia Type:  general  Patient's Preference:   Intra-op Monitoring Plan: standard ASA monitors  Intra-op Monitoring Plan Comments:   Post Op Pain Control Plan: per primary service following discharge from PACU  Post Op Pain Control Plan Comments:   Induction:   IV  Beta Blocker:  Patient is not currently on a Beta-Blocker (No further documentation required).       Informed Consent: Patient understands risks and agrees with Anesthesia plan.  Questions answered. Anesthesia consent signed with patient.  ASA Score: 3     Day of Surgery Review of History & Physical:    H&P update referred to the surgeon.         Ready For Surgery From Anesthesia Perspective.

## 2020-07-06 NOTE — PRE ADMISSION SCREENING
PAT call completed and patient educated on the bowel prep, clear liquid diet and procedure instructions. Medical history discussed and patient informed of arrival times 0730 and 2nd prep dose 0400. Pt will be accompanied by Izabella and is made aware of limited-visitor policy, and is made aware that  is to remain during entire visit. All questions and concerns addressed.  Informed to take AM medications of losartan. NPO after 2nd bowel prep. Patient verbalizes understanding of teaching and all instructions. Pre-procedure Covid testing a rapid. Patient aware.

## 2020-07-06 NOTE — TELEPHONE ENCOUNTER
Spoke with patient regarding procedure scheduled for 7/7/2020.  Patient stated she would talk with her daughter regarding procedure date/time.

## 2020-07-07 ENCOUNTER — ANESTHESIA (OUTPATIENT)
Dept: ENDOSCOPY | Facility: HOSPITAL | Age: 80
End: 2020-07-07
Payer: MEDICARE

## 2020-07-07 ENCOUNTER — HOSPITAL ENCOUNTER (OUTPATIENT)
Facility: HOSPITAL | Age: 80
Discharge: HOME | End: 2020-07-07
Attending: INTERNAL MEDICINE | Admitting: INTERNAL MEDICINE
Payer: MEDICARE

## 2020-07-07 VITALS
BODY MASS INDEX: 26.61 KG/M2 | WEIGHT: 155.88 LBS | HEART RATE: 52 BPM | HEIGHT: 64 IN | RESPIRATION RATE: 20 BRPM | TEMPERATURE: 97 F | SYSTOLIC BLOOD PRESSURE: 173 MMHG | DIASTOLIC BLOOD PRESSURE: 74 MMHG | OXYGEN SATURATION: 100 %

## 2020-07-07 DIAGNOSIS — K63.5 BENIGN COLON POLYP: Primary | ICD-10-CM

## 2020-07-07 LAB
POCT GLUCOSE: 108 MG/DL (ref 70–110)
SARS-COV-2 RDRP RESP QL NAA+PROBE: NEGATIVE

## 2020-07-07 PROCEDURE — 88305 TISSUE EXAM BY PATHOLOGIST: CPT | Mod: 26,HCNC,, | Performed by: PATHOLOGY

## 2020-07-07 PROCEDURE — 45385 PR COLONOSCOPY,REMV LESN,SNARE: ICD-10-PCS | Mod: PT,HCNC,, | Performed by: INTERNAL MEDICINE

## 2020-07-07 PROCEDURE — 63600175 PHARM REV CODE 636 W HCPCS: Mod: HCNC

## 2020-07-07 PROCEDURE — 37000008 HC ANESTHESIA 1ST 15 MINUTES: Mod: HCNC | Performed by: INTERNAL MEDICINE

## 2020-07-07 PROCEDURE — 88305 TISSUE EXAM BY PATHOLOGIST: CPT | Mod: HCNC | Performed by: PATHOLOGY

## 2020-07-07 PROCEDURE — 82962 GLUCOSE BLOOD TEST: CPT | Mod: HCNC | Performed by: INTERNAL MEDICINE

## 2020-07-07 PROCEDURE — 27201089 HC SNARE, DISP (ANY): Mod: HCNC | Performed by: INTERNAL MEDICINE

## 2020-07-07 PROCEDURE — D9220A PRA ANESTHESIA: Mod: PT,HCNC,CRNA, | Performed by: NURSE ANESTHETIST, CERTIFIED REGISTERED

## 2020-07-07 PROCEDURE — 88305 TISSUE EXAM BY PATHOLOGIST: ICD-10-PCS | Mod: 26,HCNC,, | Performed by: PATHOLOGY

## 2020-07-07 PROCEDURE — 37000009 HC ANESTHESIA EA ADD 15 MINS: Mod: HCNC | Performed by: INTERNAL MEDICINE

## 2020-07-07 PROCEDURE — U0002 COVID-19 LAB TEST NON-CDC: HCPCS | Mod: HCNC

## 2020-07-07 PROCEDURE — D9220A PRA ANESTHESIA: ICD-10-PCS | Mod: PT,HCNC,CRNA, | Performed by: NURSE ANESTHETIST, CERTIFIED REGISTERED

## 2020-07-07 PROCEDURE — D9220A PRA ANESTHESIA: Mod: PT,HCNC,ANES, | Performed by: ANESTHESIOLOGY

## 2020-07-07 PROCEDURE — 45385 COLONOSCOPY W/LESION REMOVAL: CPT | Mod: HCNC | Performed by: INTERNAL MEDICINE

## 2020-07-07 PROCEDURE — 25000003 PHARM REV CODE 250: Mod: HCNC | Performed by: NURSE ANESTHETIST, CERTIFIED REGISTERED

## 2020-07-07 PROCEDURE — 63600175 PHARM REV CODE 636 W HCPCS: Mod: HCNC | Performed by: NURSE ANESTHETIST, CERTIFIED REGISTERED

## 2020-07-07 PROCEDURE — 45385 COLONOSCOPY W/LESION REMOVAL: CPT | Mod: PT,HCNC,, | Performed by: INTERNAL MEDICINE

## 2020-07-07 PROCEDURE — 63600175 PHARM REV CODE 636 W HCPCS: Mod: HCNC | Performed by: ANESTHESIOLOGY

## 2020-07-07 PROCEDURE — D9220A PRA ANESTHESIA: ICD-10-PCS | Mod: PT,HCNC,ANES, | Performed by: ANESTHESIOLOGY

## 2020-07-07 RX ORDER — LIDOCAINE HYDROCHLORIDE 10 MG/ML
1 INJECTION, SOLUTION EPIDURAL; INFILTRATION; INTRACAUDAL; PERINEURAL ONCE
Status: DISCONTINUED | OUTPATIENT
Start: 2020-07-07 | End: 2020-07-07 | Stop reason: HOSPADM

## 2020-07-07 RX ORDER — ETOMIDATE 2 MG/ML
INJECTION INTRAVENOUS
Status: DISCONTINUED | OUTPATIENT
Start: 2020-07-07 | End: 2020-07-07

## 2020-07-07 RX ORDER — LIDOCAINE HCL/PF 100 MG/5ML
SYRINGE (ML) INTRAVENOUS
Status: DISCONTINUED | OUTPATIENT
Start: 2020-07-07 | End: 2020-07-07

## 2020-07-07 RX ORDER — SODIUM CHLORIDE 0.9 % (FLUSH) 0.9 %
10 SYRINGE (ML) INJECTION
Status: DISCONTINUED | OUTPATIENT
Start: 2020-07-07 | End: 2020-07-07 | Stop reason: HOSPADM

## 2020-07-07 RX ORDER — PROPOFOL 10 MG/ML
VIAL (ML) INTRAVENOUS
Status: DISCONTINUED | OUTPATIENT
Start: 2020-07-07 | End: 2020-07-07

## 2020-07-07 RX ORDER — SODIUM CHLORIDE, SODIUM LACTATE, POTASSIUM CHLORIDE, CALCIUM CHLORIDE 600; 310; 30; 20 MG/100ML; MG/100ML; MG/100ML; MG/100ML
INJECTION, SOLUTION INTRAVENOUS CONTINUOUS
Status: DISCONTINUED | OUTPATIENT
Start: 2020-07-07 | End: 2020-07-07 | Stop reason: HOSPADM

## 2020-07-07 RX ADMIN — PROPOFOL 10 MG: 10 INJECTION, EMULSION INTRAVENOUS at 10:07

## 2020-07-07 RX ADMIN — SODIUM CHLORIDE, SODIUM LACTATE, POTASSIUM CHLORIDE, AND CALCIUM CHLORIDE: 600; 310; 30; 20 INJECTION, SOLUTION INTRAVENOUS at 09:07

## 2020-07-07 RX ADMIN — ETOMIDATE 2 MG: 2 INJECTION, SOLUTION INTRAVENOUS at 10:07

## 2020-07-07 RX ADMIN — ETOMIDATE 2 MG: 2 INJECTION, SOLUTION INTRAVENOUS at 09:07

## 2020-07-07 RX ADMIN — PROPOFOL 10 MG: 10 INJECTION, EMULSION INTRAVENOUS at 09:07

## 2020-07-07 RX ADMIN — ETOMIDATE 6 MG: 2 INJECTION, SOLUTION INTRAVENOUS at 09:07

## 2020-07-07 RX ADMIN — Medication 50 MG: at 09:07

## 2020-07-07 NOTE — TRANSFER OF CARE
"Anesthesia Transfer of Care Note    Patient: Danyelle Garcia    Procedure(s) Performed: Procedure(s) (LRB):  COLONOSCOPY rapid covid-19 (N/A)    Patient location: PACU    Anesthesia Type: MAC    Transport from OR: Transported from OR on room air with adequate spontaneous ventilation    Post pain: adequate analgesia    Post assessment: no apparent anesthetic complications and tolerated procedure well    Post vital signs: stable    Level of consciousness: awake, alert and oriented    Complications: none    Transfer of care protocol was followed      Last vitals:   Visit Vitals  BP (!) 169/95 (BP Location: Right arm, Patient Position: Sitting)   Pulse (!) 49   Temp 36.5 °C (97.7 °F) (Temporal)   Resp 16   Ht 5' 4" (1.626 m)   Wt 70.7 kg (155 lb 13.8 oz)   SpO2 98%   Breastfeeding No   BMI 26.75 kg/m²     "

## 2020-07-07 NOTE — H&P
PRE PROCEDURE H&P    Patient Name: Danyelle Garcia  MRN: [id number]  : 1940  Date of Procedure:  2020  Referring Physician: Kevin Olson LPN  Primary Physician: Dilma Chan MD  Procedure Physician: Sean Paz MD       Planned Procedure: Colonoscopy  Diagnosis: previous adenomatous polyp  Chief Complaint: Same as above    HPI: Patient is an 79 y.o. female is here for the above.     Last colonoscopy:       Past Medical History:   Past Medical History:   Diagnosis Date    Breast cancer     left breast treated with lumpectomy, radiation, and chemo    CAD (coronary artery disease)     Colon polyp     Diabetes mellitus, type 2     Diverticular disease     Dizziness     External hemorrhoid     H. pylori infection     Headache     Hyperlipidemia     Hypertension     Insomnia     Osteopenia     Postmenopausal     Tobacco use     Vitamin D deficiency disease         Past Surgical History:  Past Surgical History:   Procedure Laterality Date    BREAST LUMPECTOMY Left     CATARACT EXTRACTION      CHOLECYSTECTOMY      COLONOSCOPY      EYE SURGERY Right     cataract    HYSTERECTOMY      left lumpectomy      TOTAL ABDOMINAL HYSTERECTOMY W/ BILATERAL SALPINGOOPHORECTOMY      Due to benign reasons per patient    UPPER GASTROINTESTINAL ENDOSCOPY          Home Medications:  Prior to Admission medications    Medication Sig Start Date End Date Taking? Authorizing Provider   atorvastatin (LIPITOR) 80 MG tablet Take 1 tablet (80 mg total) by mouth every evening. 20  Yes Dilma Chan MD   glipiZIDE (GLUCOTROL) 5 MG tablet TAKE 1/2 TABLET BY MOUTH WITH BREAKFAST 20  Yes Dilma Chan MD   losartan (COZAAR) 50 MG tablet TAKE 1 TABLET(50 MG) BY MOUTH EVERY DAY 20  Yes Dilma Chan MD   pantoprazole (PROTONIX) 40 MG tablet TK 1 T PO  ONCE D 20  Yes Dilma Chan MD   aspirin (ECOTRIN) 81 MG EC tablet Take 81 mg by mouth daily as needed for Pain.     Historical Provider, MD   blood glucose control, normal (ACCU-CHEK SMARTVIEW CONTRL SOL) Soln 1 each by Misc.(Non-Drug; Combo Route) route 2 (two) times daily. 5/8/18 5/8/19  Dilma Chan MD   blood sugar diagnostic Strp 1 strip by Misc.(Non-Drug; Combo Route) route 2 (two) times daily. 5/8/18   Dilma Chan MD   blood-glucose meter kit Use as instructed 5/8/18 5/7/19  Dilma Chan MD   calcium carbonate (OS-DAMIAN) 600 mg (1,500 mg) Tab Take 600 mg by mouth 2 (two) times daily with meals.    Historical Provider, MD   ergocalciferol (ERGOCALCIFEROL) 50,000 unit Cap Take 1 capsule (50,000 Units total) by mouth every 7 days. 7/5/20   Dilma Chan MD   gabapentin (NEURONTIN) 100 MG capsule  6/22/20   Historical Provider, MD   lancets (TRUEPLUS LANCETS) 30 gauge Misc Inject 1 lancet into the skin 2 (two) times daily. 5/8/18   Dilma Chan MD   nicotine, polacrilex, (NICORETTE) 2 mg Gum Take 1 each (2 mg total) by mouth as needed (Chew 1 piece of gum every 1 to 2 hours (maximum: 24 pieces/day) when urge to smoke occurs; to increase chances of quitting, chew at least 9 pieces/day during the first 6 weeks). 7/24/19   Dilma Chan MD   nystatin (MYCOSTATIN) cream Apply topically 2 (two) times daily as needed. For up to 3 weeks as directed 6/24/20   Dilma Chan MD   polyethylene glycol (GLYCOLAX) 17 gram/dose powder MIX AND DRINK 1 CAPFUL(17 GM) BY MOUTH IN 8 OZ OF LIQUID ONCE DAILY FOR 7 DAYS 9/10/19   Dilma Chan MD   TRUEPLUS LANCETS 28 gauge Misc USE TO TEST BLOOD SUGAR TWICE DAILY 9/10/19   Dilma Chan MD        Allergies:  Review of patient's allergies indicates:  No Known Allergies     Social History:   Social History     Socioeconomic History    Marital status:      Spouse name: Not on file    Number of children: 4    Years of education: Not on file    Highest education level: Not on file   Occupational History    Occupation: Retired   Social Needs     "Financial resource strain: Not on file    Food insecurity     Worry: Not on file     Inability: Not on file    Transportation needs     Medical: Not on file     Non-medical: Not on file   Tobacco Use    Smoking status: Current Some Day Smoker     Packs/day: 0.40     Years: 40.00     Pack years: 16.00     Types: Cigarettes    Smokeless tobacco: Never Used    Tobacco comment: some times tries to quit   Substance and Sexual Activity    Alcohol use: Yes     Alcohol/week: 6.0 standard drinks     Types: 6 Cans of beer per week     Comment: Occasionally    Drug use: No    Sexual activity: Not Currently   Lifestyle    Physical activity     Days per week: 0 days     Minutes per session: 0 min    Stress: Not at all   Relationships    Social connections     Talks on phone: Three times a week     Gets together: Three times a week     Attends Mu-ism service: More than 4 times per year     Active member of club or organization: No     Attends meetings of clubs or organizations: Never     Relationship status:    Other Topics Concern    Not on file   Social History Narrative    She wears seatbelt. She lives alone and continues to drive.       Family History:  Family History   Problem Relation Age of Onset    Hypertension Mother     Heart attack Father     Heart disease Father         MI/CAD    Diabetes Daughter     Diabetes Son     No Known Problems Daughter     No Known Problems Son     Cancer Neg Hx     Stroke Neg Hx        ROS: No acute cardiac events, no acute respiratory complaints.     Physical Exam (all patients):    BP (!) 169/95 (BP Location: Right arm, Patient Position: Sitting)   Pulse (!) 49   Temp 97.7 °F (36.5 °C) (Temporal)   Resp 16   Ht 5' 4" (1.626 m)   Wt 70.7 kg (155 lb 13.8 oz)   SpO2 98%   Breastfeeding No   BMI 26.75 kg/m²   Lungs: Clear to auscultation bilaterally, respirations unlabored  Heart: Regular rate and rhythm, S1 and S2 normal, no obvious murmurs  Abdomen:    "      Soft, non-tender, bowel sounds normal, no masses, no organomegaly    Lab Results   Component Value Date    WBC 7.93 06/24/2020     (H) 06/24/2020    RDW 13.8 06/24/2020     06/24/2020    GLU 37 (LL) 06/24/2020    HGBA1C 5.5 06/24/2020    BUN 15 06/24/2020     06/24/2020    K 3.3 (L) 06/24/2020     06/24/2020        SEDATION PLAN: per anesthesia      History reviewed, vital signs satisfactory, cardiopulmonary status satisfactory, sedation options, risks and plans have been discussed with the patient  All their questions were answered and the patient agrees to the sedation procedures as planned and the patient is deemed an appropriate candidate for the sedation as planned.    Procedure explained to patient, informed consent obtained and placed in chart.    Sean Paz  7/7/2020  9:35 AM

## 2020-07-07 NOTE — ANESTHESIA POSTPROCEDURE EVALUATION
Anesthesia Post Evaluation    Patient: Danyelle Garcia    Procedure(s) Performed: Procedure(s) (LRB):  COLONOSCOPY rapid covid-19 (N/A)    Final Anesthesia Type: general    Patient location during evaluation: PACU  Patient participation: Yes- Able to Participate  Level of consciousness: awake and alert and oriented  Post-procedure vital signs: reviewed and stable  Pain management: adequate  Airway patency: patent    PONV status at discharge: No PONV  Anesthetic complications: no      Cardiovascular status: blood pressure returned to baseline, stable and hemodynamically stable  Respiratory status: unassisted  Hydration status: euvolemic  Follow-up not needed.          Vitals Value Taken Time   /74 07/07/20 1045   Temp 36.2 °C (97.2 °F) 07/07/20 1018   Pulse 52 07/07/20 1045   Resp 20 07/07/20 1045   SpO2 100 % 07/07/20 1045         Event Time   Out of Recovery 10:50:00         Pain/Jerod Score: Jerod Score: 10 (7/7/2020 10:55 AM)

## 2020-07-07 NOTE — PROVATION PATIENT INSTRUCTIONS
Discharge Summary/Instructions after an Endoscopic Procedure  Patient Name: Danyelle Garcia  Patient MRN: [id number]  Patient YOB: 1940 Tuesday, July 7, 2020  Sean Paz MD  RESTRICTIONS:  During your procedure today, you received medications for sedation.  These   medications may affect your judgment, balance and coordination.  Therefore,   for 24 hours, you have the following restrictions:   - DO NOT drive a car, operate machinery, make legal/financial decisions,   sign important papers or drink alcohol.    ACTIVITY:  Today: no heavy lifting, straining or running due to procedural   sedation/anesthesia.  The following day: return to full activity including work.  DIET:  Eat and drink normally unless instructed otherwise.     TREATMENT FOR COMMON SIDE EFFECTS:  - Mild abdominal pain, nausea, belching, bloating or excessive gas:  rest,   eat lightly and use a heating pad.  - Sore Throat: treat with throat lozenges and/or gargle with warm salt   water.  - Because air was used during the procedure, expelling large amounts of air   from your rectum or belching is normal.  - If a bowel prep was taken, you may not have a bowel movement for 1-3 days.    This is normal.  SYMPTOMS TO WATCH FOR AND REPORT TO YOUR PHYSICIAN:  1. Abdominal pain or bloating, other than gas cramps.  2. Chest pain.  3. Back pain.  4. Signs of infection such as: chills or fever occurring within 24 hours   after the procedure.  5. Rectal bleeding, which would show as bright red, maroon, or black stools.   (A tablespoon of blood from the rectum is not serious, especially if   hemorrhoids are present.)  6. Vomiting.  7. Weakness or dizziness.  GO DIRECTLY TO THE NEAREST EMERGENCY ROOM IF YOU HAVE ANY OF THE FOLLOWING:      Difficulty breathing              Chills and/or fever over 101 F   Persistent vomiting and/or vomiting blood   Severe abdominal pain   Severe chest pain   Black, tarry stools   Bleeding- more than one  tablespoon   Any other symptom or condition that you feel may need urgent attention  Your doctor recommends these additional instructions:  If any biopsies were taken, your doctors clinic will contact you in 1 to 2   weeks with any results.  - Discharge patient to home.   - Resume previous diet.   - Continue present medications.   - Await pathology results.   - Repeat colonoscopy in 5 years for surveillance after piecemeal   polypectomy.  For questions, problems or results please call your physician Sean Paz MD at Work:  [phone]  If you have any questions about the above instructions, call the GI   department at [phone] or call the endoscopy unit at [phone]   from 7am until 3 pm.  [facility] [place], EMERGENCY ROOM PHONE NUMBER:   [phone]  IF A COMPLICATION OR EMERGENCY SITUATION ARISES AND YOU ARE UNABLE TO REACH   YOUR PHYSICIAN - GO DIRECTLY TO THE EMERGENCY ROOM.  I have read or have had read to me these discharge instructions for my   procedure and have received a written copy.  I understand these   instructions and will follow-up with my physician if I have any questions.     __________________________________       _____________________________________  Nurse Signature                                          Patient/Designated   Responsible Party Signature  MD Sean De Jesus MD  7/7/2020 10:18:06 AM  This report has been verified and signed electronically.  [employer]

## 2020-07-07 NOTE — DISCHARGE INSTRUCTIONS
Understanding Colon and Rectal Polyps    The colon (also called the large intestine) is a muscular tube that forms the last part of the digestive tract. It absorbs water and stores food waste. The colon is about 4 to 6 feet long. The rectum is the last 6 inches of the colon. The colon and rectum have a smooth lining composed of millions of cells. Changes in these cells can lead to growths in the colon that can become cancerous and should be removed. Multiple tests are available to screen for colon cancer, but the colonoscopy is the most recommended test. During colonoscopy, these polyps can be removed. How often you need this test depends on many things including your condition, your family history, symptoms, and what the findings were at the previous colonoscopy.   When the colon lining changes  Changes that happen in the cells that line the colon or rectum can lead to growths called polyps. Over a period of years, polyps can turn cancerous. Removing polyps early may prevent cancer from ever forming.  Polyps  Polyps are fleshy clumps of tissue that form on the lining of the colon or rectum. Small polyps are usually benign (not cancerous). However, over time, cells in a polyp can change and become cancerous. Certain types of polyps known as adenomatous polyps are premalignant. The risk for invasive cancer increases with the size of the polyp and certain cell and gene features. This means that they can become cancerous if they're not removed. Hyperplastic polyps are benign. They can grow quite large and not turn cancerous.   Cancer  Almost all colorectal cancers start when polyp cells begin growing abnormally. As a cancerous tumor grows, it may involve more and more of the colon or rectum. In time, cancer can also grow beyond the colon or rectum and spread to nearby organs or to glands called lymph nodes. The cells can also travel to other parts of the body. This is known as metastasis. The earlier a cancerous  tumor is removed, the better the chance of preventing its spread.    Date Last Reviewed: 8/1/2016  © 4966-1362 [employer]. [address] [place] [address], [place] [address]. All rights reserved. This information is not intended as a substitute for professional medical care. Always follow your healthcare professional's instructions.        Diverticulosis    Diverticulosis means that small pouches have formed in the wall of your large intestine (colon). Most often, this problem causes no symptoms and is common as people age. But the pouches in the colon are at risk of becoming infected. When this happens, the condition is called diverticulitis. Although most people with diverticulosis never develop diverticulitis, it is still not uncommon. Rectal bleeding can also occur and in less common situations, a type of colon inflammation called colitis.  While most people do not have symptoms, some people with diverticulosis may have:  · Abdominal cramps and pain  · Bloating  · Constipation  · Change in bowel habits  Causes  The exact cause of diverticulosis (and diverticulitis) has not been proved, but a few things are associated with the condition:  · Low-fiber diet  · Constipation  · Lack of exercise  Your healthcare provider will talk with you about how to manage your condition. Diet changes may be all that are needed to help control diverticulosis and prevent progression to diverticulitis. If you develop diverticulitis, you will likely need other treatments.  Home care  You may be told to take fiber supplements daily. Fiber adds bulk to the stool so that it passes through the colon more easily. Stool softeners may be recommended. You may also be given medications for pain relief. Be sure to take all medications as directed.  In the past, people were told to avoid corn, nuts, and seeds. This is no longer necessary.  Follow these guidelines when caring for yourself at home:  · Eat unprocessed foods that are high in  fiber. Whole grains, fruits, and vegetables are good choices.  · Drink 6 to 8 glasses of water every day unless your healthcare provider has you limit how much fluid you should have.  · Watch for changes in your bowel movements. Tell your provider if you notice any changes.  · Begin an exercise program. Ask your provider how to get started. Generally, walking is the best.  · Get plenty of rest and sleep.  Follow-up care  Follow up with your healthcare provider, or as advised. Regular visits may be needed to check on your health. Sometimes special procedures such as colonoscopy, are needed after an episode of diverticulitis or blooding. Be sure to keep all your appointments.  If a stool sample was taken, or cultures were done, you should be told if they are positive, or if your treatment needs to be changed. You can call as directed for the results.  If X-rays were done, a radiologist will look at them. You will be told if there is a change in your treatment.  If antibiotics were prescribed, be sure to finish them all.  When to seek medical advice  Call your healthcare provider right away if any of these occur:  · Fever of 100.4°F (38°C) or higher, or as directed by your healthcare provider  · Severe cramps in the lower left side of the abdomen or pain that is getting worse  · Tenderness in the lower left side of the abdomen or worsening pain throughout the abdomen  · Diarrhea or constipation that doesn't get better within 24 hours  · Nausea and vomiting  · Bleeding from the rectum  Call 911  Call emergency services if any of the following occur:  · Trouble breathing  · Confusion  · Very drowsy or trouble awakening  · Fainting or loss of consciousness  · Rapid heart rate  · Chest pain  Date Last Reviewed: 12/30/2015  © 1063-2989 [employer]. [address] [place] [address], [place] [address]. All rights reserved. This information is not intended as a substitute for professional medical care. Always follow your  healthcare professional's instructions.

## 2020-07-12 LAB
FINAL PATHOLOGIC DIAGNOSIS: NORMAL
GROSS: NORMAL

## 2020-07-13 NOTE — PROGRESS NOTES
The polyps removed were tubular adenomas. Not cancerous. She will need a repeat in 5 years for polyp surveillance.    Please inform her    Sean Paz MD  Gastroenterology

## 2020-07-22 ENCOUNTER — OFFICE VISIT (OUTPATIENT)
Dept: HEMATOLOGY/ONCOLOGY | Facility: CLINIC | Age: 80
End: 2020-07-22
Payer: MEDICARE

## 2020-07-22 VITALS
TEMPERATURE: 99 F | HEIGHT: 64 IN | OXYGEN SATURATION: 98 % | SYSTOLIC BLOOD PRESSURE: 154 MMHG | BODY MASS INDEX: 27.13 KG/M2 | HEART RATE: 48 BPM | WEIGHT: 158.94 LBS | DIASTOLIC BLOOD PRESSURE: 77 MMHG

## 2020-07-22 DIAGNOSIS — Z17.0 MALIGNANT NEOPLASM OF LEFT BREAST IN FEMALE, ESTROGEN RECEPTOR POSITIVE, UNSPECIFIED SITE OF BREAST: ICD-10-CM

## 2020-07-22 DIAGNOSIS — Z12.31 ENCOUNTER FOR SCREENING MAMMOGRAM FOR MALIGNANT NEOPLASM OF BREAST: ICD-10-CM

## 2020-07-22 DIAGNOSIS — C50.912 MALIGNANT NEOPLASM OF LEFT BREAST IN FEMALE, ESTROGEN RECEPTOR POSITIVE, UNSPECIFIED SITE OF BREAST: ICD-10-CM

## 2020-07-22 DIAGNOSIS — Z12.39 SCREENING FOR BREAST CANCER: Primary | ICD-10-CM

## 2020-07-22 PROCEDURE — 3077F PR MOST RECENT SYSTOLIC BLOOD PRESSURE >= 140 MM HG: ICD-10-PCS | Mod: HCNC,CPTII,S$GLB, | Performed by: INTERNAL MEDICINE

## 2020-07-22 PROCEDURE — 99999 PR PBB SHADOW E&M-EST. PATIENT-LVL IV: ICD-10-PCS | Mod: PBBFAC,HCNC,, | Performed by: INTERNAL MEDICINE

## 2020-07-22 PROCEDURE — 99499 UNLISTED E&M SERVICE: CPT | Mod: S$GLB,,, | Performed by: INTERNAL MEDICINE

## 2020-07-22 PROCEDURE — 99499 RISK ADDL DX/OHS AUDIT: ICD-10-PCS | Mod: S$GLB,,, | Performed by: INTERNAL MEDICINE

## 2020-07-22 PROCEDURE — 1101F PT FALLS ASSESS-DOCD LE1/YR: CPT | Mod: HCNC,CPTII,S$GLB, | Performed by: INTERNAL MEDICINE

## 2020-07-22 PROCEDURE — 3077F SYST BP >= 140 MM HG: CPT | Mod: HCNC,CPTII,S$GLB, | Performed by: INTERNAL MEDICINE

## 2020-07-22 PROCEDURE — 99999 PR PBB SHADOW E&M-EST. PATIENT-LVL IV: CPT | Mod: PBBFAC,HCNC,, | Performed by: INTERNAL MEDICINE

## 2020-07-22 PROCEDURE — 1159F PR MEDICATION LIST DOCUMENTED IN MEDICAL RECORD: ICD-10-PCS | Mod: HCNC,S$GLB,, | Performed by: INTERNAL MEDICINE

## 2020-07-22 PROCEDURE — 99214 OFFICE O/P EST MOD 30 MIN: CPT | Mod: HCNC,S$GLB,, | Performed by: INTERNAL MEDICINE

## 2020-07-22 PROCEDURE — 1126F PR PAIN SEVERITY QUANTIFIED, NO PAIN PRESENT: ICD-10-PCS | Mod: HCNC,S$GLB,, | Performed by: INTERNAL MEDICINE

## 2020-07-22 PROCEDURE — 3078F DIAST BP <80 MM HG: CPT | Mod: HCNC,CPTII,S$GLB, | Performed by: INTERNAL MEDICINE

## 2020-07-22 PROCEDURE — 99214 PR OFFICE/OUTPT VISIT, EST, LEVL IV, 30-39 MIN: ICD-10-PCS | Mod: HCNC,S$GLB,, | Performed by: INTERNAL MEDICINE

## 2020-07-22 PROCEDURE — 1101F PR PT FALLS ASSESS DOC 0-1 FALLS W/OUT INJ PAST YR: ICD-10-PCS | Mod: HCNC,CPTII,S$GLB, | Performed by: INTERNAL MEDICINE

## 2020-07-22 PROCEDURE — 3078F PR MOST RECENT DIASTOLIC BLOOD PRESSURE < 80 MM HG: ICD-10-PCS | Mod: HCNC,CPTII,S$GLB, | Performed by: INTERNAL MEDICINE

## 2020-07-22 PROCEDURE — 1159F MED LIST DOCD IN RCRD: CPT | Mod: HCNC,S$GLB,, | Performed by: INTERNAL MEDICINE

## 2020-07-22 PROCEDURE — 1126F AMNT PAIN NOTED NONE PRSNT: CPT | Mod: HCNC,S$GLB,, | Performed by: INTERNAL MEDICINE

## 2020-07-22 NOTE — PROGRESS NOTES
Subjective:       Patient ID: aDnyelle Garcia is a 79 y.o. female.    Chief Complaint: No chief complaint on file.    MIKE Cutler is a 79 year old  lady who comes for follow up of her previously diagnosed triple negative breast cancer.  on  was diagnosed as having cancer of the left breast and underwent  lumpectomy along with sentinel lymph node biopsy. The pathology report from  the [facility] indicated that the   patient had an invasive ductal carcinoma of the breast measuring 1.5 cm in   diameter.     The ER receptors and the HER-2 kris tests were negative.   She received 4 cycles of adjuvant AC which she tolerated well.  She had radiation therapy.  She has stable, chronic Bell's palsy  She also has HBP , diabetes and elevated cholesterol for which she takes medications      ALLERGIES: No known drug allergies.     CURRENT MEDICATIONS: see med card.     PREVIOUS SURGERIES: Cholecystectomy, hysterectomy, lumpectomy and sentinel   lymph node biopsy on 3/17/05.     SOCIAL HISTORY: She is single. She has three children. She lives in [place]. She used to smoke for 20 years averaging 4 to 5 cigarettes a day and  stopped a month ago. She drinks about a six pack of beer a month. She used   to work in [employer].     FAMILY DISEASES: No diabetes or cancer in the family. Father  of a   heart attack.     PAST MEDICAL HISTORY:  1-Breast cancer s/p surgery. radiaion therapy and adjuvant chemo.  2-chronic stable Bell's palsy  3-diabetes mellitus  Review of Systems   Constitutional: Negative.    HENT: Negative.    Eyes: Negative.    Respiratory: Negative.  Negative for cough and wheezing.    Cardiovascular: Negative.  Negative for chest pain.   Gastrointestinal: Negative.    Genitourinary: Negative.    Neurological: Negative.    Psychiatric/Behavioral: Negative.          Objective:      Physical Exam  Constitutional:       Appearance: She is well-developed.    Eyes:      Pupils: Pupils are equal, round, and reactive to light.   Neck:      Musculoskeletal: Normal range of motion and neck supple.   Cardiovascular:      Rate and Rhythm: Normal rate and regular rhythm.      Heart sounds: Normal heart sounds. No murmur. No gallop.    Pulmonary:      Effort: No respiratory distress.      Breath sounds: No wheezing or rales.      Comments: bilateral breast exam unremarkable  Chest:      Chest wall: No tenderness.   Abdominal:      General: Bowel sounds are normal.      Palpations: Abdomen is soft. There is no mass.      Tenderness: There is no abdominal tenderness. There is no guarding or rebound.   Musculoskeletal: Normal range of motion.   Skin:     General: Skin is warm and dry.   Neurological:      Mental Status: She is alert and oriented to person, place, and time.         Wt Readings from Last 3 Encounters:   07/22/20 72.1 kg (158 lb 15.2 oz)   07/07/20 70.7 kg (155 lb 13.8 oz)   06/24/20 72.3 kg (159 lb 4.5 oz)     Temp Readings from Last 3 Encounters:   07/22/20 99.2 °F (37.3 °C)   07/07/20 97.2 °F (36.2 °C) (Temporal)   06/24/20 98.7 °F (37.1 °C) (Oral)     BP Readings from Last 3 Encounters:   07/22/20 (!) 154/77   07/07/20 (!) 173/74   06/24/20 136/70     Pulse Readings from Last 3 Encounters:   07/22/20 (!) 48   07/07/20 (!) 52   06/24/20 66       Assessment:       1. Screening for breast cancer    2. Malignant neoplasm of left breast in female, estrogen receptor positive, unspecified site of breast    3. Encounter for screening mammogram for malignant neoplasm of breast         Plan:       Lab Results   Component Value Date    WBC 7.93 06/24/2020    HGB 12.7 06/24/2020    HCT 39.9 06/24/2020     (H) 06/24/2020     06/24/2020       Lab Results   Component Value Date    CREATININE 0.8 06/24/2020     Lab Results   Component Value Date    ALT 11 06/24/2020    AST 18 06/24/2020    ALKPHOS 114 06/24/2020    BILITOT 0.4 06/24/2020      Will see me back in 6  months with a cbc/cmp. Screening mammogram due, will order.

## 2020-07-22 NOTE — LETTER
July 22, 2020      Dilma Chan MD  [address] [place] [address]           [facility]  [address] [facility]  [place] [address]  Phone: [phone]  Fax: [phone]          Patient: Danyelle Garcia   MR Number: [id number]   YOB: 1940   Date of Visit: 7/22/2020       Dear Dr. Dilma Chan:    Thank you for referring Danyelle Garcia to me for evaluation. Attached you will find relevant portions of my assessment and plan of care.    If you have questions, please do not hesitate to call me. I look forward to following Danyelle Garcia along with you.    Sincerely,    Octaviano Finley MD    Enclosure  CC:  No Recipients    If you would like to receive this communication electronically, please contact [email or web address] or [phone] to request more information on [employer] access.    For providers and/or their staff who would like to refer a patient to [facility], please contact us through our one-stop-shop provider referral line, [facility], at [phone].    If you feel you have received this communication in error or would no longer like to receive these types of communications, please e-mail [email or web address]

## 2020-07-29 DIAGNOSIS — K21.9 GASTROESOPHAGEAL REFLUX DISEASE, ESOPHAGITIS PRESENCE NOT SPECIFIED: ICD-10-CM

## 2020-07-30 RX ORDER — PANTOPRAZOLE SODIUM 40 MG/1
TABLET, DELAYED RELEASE ORAL
Qty: 90 TABLET | Refills: 1 | Status: SHIPPED | OUTPATIENT
Start: 2020-07-30 | End: 2020-09-01 | Stop reason: SDUPTHER

## 2020-09-01 DIAGNOSIS — E11.51 TYPE 2 DIABETES MELLITUS WITH PERIPHERAL ARTERY DISEASE: ICD-10-CM

## 2020-09-01 DIAGNOSIS — E11.59 HYPERTENSION ASSOCIATED WITH DIABETES: ICD-10-CM

## 2020-09-01 DIAGNOSIS — K21.9 GASTROESOPHAGEAL REFLUX DISEASE, ESOPHAGITIS PRESENCE NOT SPECIFIED: ICD-10-CM

## 2020-09-01 DIAGNOSIS — I15.2 HYPERTENSION ASSOCIATED WITH DIABETES: ICD-10-CM

## 2020-09-01 RX ORDER — PANTOPRAZOLE SODIUM 40 MG/1
TABLET, DELAYED RELEASE ORAL
Qty: 90 TABLET | Refills: 1 | Status: SHIPPED | OUTPATIENT
Start: 2020-09-01 | End: 2021-08-05

## 2020-09-01 RX ORDER — LOSARTAN POTASSIUM 50 MG/1
TABLET ORAL
Qty: 90 TABLET | Refills: 1 | Status: SHIPPED | OUTPATIENT
Start: 2020-09-01 | End: 2021-05-11 | Stop reason: SDUPTHER

## 2020-09-01 RX ORDER — GLIPIZIDE 5 MG/1
TABLET ORAL
Qty: 45 TABLET | Refills: 1 | Status: SHIPPED | OUTPATIENT
Start: 2020-09-01 | End: 2021-02-21

## 2020-09-01 NOTE — TELEPHONE ENCOUNTER
Lov: 06/24/2020, requesting refills of glipizine, losartan, and pantoprazole to [facility] @ [facility].     ----- Message from Conrado Martinez sent at 9/1/2020  3:46 PM CDT -----  Regarding: refill  Pt calling in regards to she needs a refill on medications please       ===glipiZIDE (GLUCOTROL) 5 MG tablet    ===losartan (COZAAR) 50 MG tablet    ===pantoprazole (PROTONIX) 40 MG tablet      ---------------[id number] - [place] - [address]      Please advise pt can be contact at [phone]

## 2020-10-26 ENCOUNTER — TELEPHONE (OUTPATIENT)
Dept: FAMILY MEDICINE | Facility: CLINIC | Age: 80
End: 2020-10-26

## 2020-10-26 NOTE — TELEPHONE ENCOUNTER
Pt not feeling like herself  Stomach aching ribs hurting   Been having fever one day last week  Coughing every now and then   When she walks to [facility]

## 2020-10-26 NOTE — TELEPHONE ENCOUNTER
----- Message from Maddy Orozco sent at 10/26/2020 10:30 AM CDT -----  Pt would like to have order for Covid test.  Please call back at [phone].  reji Price Md

## 2021-01-20 ENCOUNTER — TELEPHONE (OUTPATIENT)
Dept: HEMATOLOGY/ONCOLOGY | Facility: CLINIC | Age: 81
End: 2021-01-20

## 2021-01-28 ENCOUNTER — OFFICE VISIT (OUTPATIENT)
Dept: FAMILY MEDICINE | Facility: CLINIC | Age: 81
End: 2021-01-28
Payer: MEDICARE

## 2021-01-28 ENCOUNTER — LAB VISIT (OUTPATIENT)
Dept: LAB | Facility: HOSPITAL | Age: 81
End: 2021-01-28
Attending: FAMILY MEDICINE
Payer: MEDICARE

## 2021-01-28 VITALS
OXYGEN SATURATION: 99 % | HEIGHT: 64 IN | BODY MASS INDEX: 27.43 KG/M2 | TEMPERATURE: 98 F | DIASTOLIC BLOOD PRESSURE: 64 MMHG | WEIGHT: 160.69 LBS | SYSTOLIC BLOOD PRESSURE: 138 MMHG | HEART RATE: 60 BPM

## 2021-01-28 DIAGNOSIS — E78.2 COMBINED HYPERLIPIDEMIA ASSOCIATED WITH TYPE 2 DIABETES MELLITUS: ICD-10-CM

## 2021-01-28 DIAGNOSIS — E11.51 TYPE 2 DIABETES MELLITUS WITH PERIPHERAL ARTERY DISEASE: ICD-10-CM

## 2021-01-28 DIAGNOSIS — I73.9 PAD (PERIPHERAL ARTERY DISEASE): ICD-10-CM

## 2021-01-28 DIAGNOSIS — K21.9 GASTROESOPHAGEAL REFLUX DISEASE, UNSPECIFIED WHETHER ESOPHAGITIS PRESENT: ICD-10-CM

## 2021-01-28 DIAGNOSIS — I15.2 HYPERTENSION ASSOCIATED WITH DIABETES: ICD-10-CM

## 2021-01-28 DIAGNOSIS — F17.200 TOBACCO USE DISORDER: ICD-10-CM

## 2021-01-28 DIAGNOSIS — E55.9 VITAMIN D DEFICIENCY: ICD-10-CM

## 2021-01-28 DIAGNOSIS — I15.2 HYPERTENSION ASSOCIATED WITH DIABETES: Primary | ICD-10-CM

## 2021-01-28 DIAGNOSIS — I25.10 CORONARY ARTERY DISEASE INVOLVING NATIVE CORONARY ARTERY OF NATIVE HEART WITHOUT ANGINA PECTORIS: Chronic | ICD-10-CM

## 2021-01-28 DIAGNOSIS — E11.59 HYPERTENSION ASSOCIATED WITH DIABETES: Primary | ICD-10-CM

## 2021-01-28 DIAGNOSIS — Z17.0 MALIGNANT NEOPLASM OF LEFT BREAST IN FEMALE, ESTROGEN RECEPTOR POSITIVE, UNSPECIFIED SITE OF BREAST: ICD-10-CM

## 2021-01-28 DIAGNOSIS — M65.341 TRIGGER RING FINGER OF RIGHT HAND: ICD-10-CM

## 2021-01-28 DIAGNOSIS — E08.40 DIABETES MELLITUS DUE TO UNDERLYING CONDITION, CONTROLLED, WITH DIABETIC NEUROPATHY, UNSPECIFIED WHETHER LONG TERM INSULIN USE: ICD-10-CM

## 2021-01-28 DIAGNOSIS — E11.69 COMBINED HYPERLIPIDEMIA ASSOCIATED WITH TYPE 2 DIABETES MELLITUS: ICD-10-CM

## 2021-01-28 DIAGNOSIS — I77.9 CAROTID ARTERY DISEASE, UNSPECIFIED LATERALITY, UNSPECIFIED TYPE: ICD-10-CM

## 2021-01-28 DIAGNOSIS — C50.912 MALIGNANT NEOPLASM OF LEFT BREAST IN FEMALE, ESTROGEN RECEPTOR POSITIVE, UNSPECIFIED SITE OF BREAST: ICD-10-CM

## 2021-01-28 DIAGNOSIS — E11.59 HYPERTENSION ASSOCIATED WITH DIABETES: ICD-10-CM

## 2021-01-28 PROCEDURE — 99999 PR PBB SHADOW E&M-EST. PATIENT-LVL V: CPT | Mod: PBBFAC,,, | Performed by: FAMILY MEDICINE

## 2021-01-28 PROCEDURE — 99499 UNLISTED E&M SERVICE: CPT | Mod: S$GLB,,, | Performed by: FAMILY MEDICINE

## 2021-01-28 PROCEDURE — 1126F AMNT PAIN NOTED NONE PRSNT: CPT | Mod: S$GLB,,, | Performed by: FAMILY MEDICINE

## 2021-01-28 PROCEDURE — 3078F DIAST BP <80 MM HG: CPT | Mod: CPTII,S$GLB,, | Performed by: FAMILY MEDICINE

## 2021-01-28 PROCEDURE — 99999 PR PBB SHADOW E&M-EST. PATIENT-LVL V: ICD-10-PCS | Mod: PBBFAC,,, | Performed by: FAMILY MEDICINE

## 2021-01-28 PROCEDURE — 99215 PR OFFICE/OUTPT VISIT, EST, LEVL V, 40-54 MIN: ICD-10-PCS | Mod: S$GLB,,, | Performed by: FAMILY MEDICINE

## 2021-01-28 PROCEDURE — 80053 COMPREHEN METABOLIC PANEL: CPT

## 2021-01-28 PROCEDURE — 3078F PR MOST RECENT DIASTOLIC BLOOD PRESSURE < 80 MM HG: ICD-10-PCS | Mod: CPTII,S$GLB,, | Performed by: FAMILY MEDICINE

## 2021-01-28 PROCEDURE — 36415 COLL VENOUS BLD VENIPUNCTURE: CPT | Mod: PO

## 2021-01-28 PROCEDURE — 82306 VITAMIN D 25 HYDROXY: CPT

## 2021-01-28 PROCEDURE — 1101F PT FALLS ASSESS-DOCD LE1/YR: CPT | Mod: CPTII,S$GLB,, | Performed by: FAMILY MEDICINE

## 2021-01-28 PROCEDURE — 1101F PR PT FALLS ASSESS DOC 0-1 FALLS W/OUT INJ PAST YR: ICD-10-PCS | Mod: CPTII,S$GLB,, | Performed by: FAMILY MEDICINE

## 2021-01-28 PROCEDURE — 3288F FALL RISK ASSESSMENT DOCD: CPT | Mod: CPTII,S$GLB,, | Performed by: FAMILY MEDICINE

## 2021-01-28 PROCEDURE — 1159F MED LIST DOCD IN RCRD: CPT | Mod: S$GLB,,, | Performed by: FAMILY MEDICINE

## 2021-01-28 PROCEDURE — 99215 OFFICE O/P EST HI 40 MIN: CPT | Mod: S$GLB,,, | Performed by: FAMILY MEDICINE

## 2021-01-28 PROCEDURE — 83036 HEMOGLOBIN GLYCOSYLATED A1C: CPT

## 2021-01-28 PROCEDURE — 1126F PR PAIN SEVERITY QUANTIFIED, NO PAIN PRESENT: ICD-10-PCS | Mod: S$GLB,,, | Performed by: FAMILY MEDICINE

## 2021-01-28 PROCEDURE — 82570 ASSAY OF URINE CREATININE: CPT

## 2021-01-28 PROCEDURE — 1159F PR MEDICATION LIST DOCUMENTED IN MEDICAL RECORD: ICD-10-PCS | Mod: S$GLB,,, | Performed by: FAMILY MEDICINE

## 2021-01-28 PROCEDURE — 3288F PR FALLS RISK ASSESSMENT DOCUMENTED: ICD-10-PCS | Mod: CPTII,S$GLB,, | Performed by: FAMILY MEDICINE

## 2021-01-28 PROCEDURE — 99499 RISK ADDL DX/OHS AUDIT: ICD-10-PCS | Mod: S$GLB,,, | Performed by: FAMILY MEDICINE

## 2021-01-28 PROCEDURE — 3075F PR MOST RECENT SYSTOLIC BLOOD PRESS GE 130-139MM HG: ICD-10-PCS | Mod: CPTII,S$GLB,, | Performed by: FAMILY MEDICINE

## 2021-01-28 PROCEDURE — 3075F SYST BP GE 130 - 139MM HG: CPT | Mod: CPTII,S$GLB,, | Performed by: FAMILY MEDICINE

## 2021-01-28 RX ORDER — MEMANTINE HYDROCHLORIDE 5 MG/1
5 TABLET ORAL 2 TIMES DAILY
COMMUNITY
End: 2022-08-26 | Stop reason: SDUPTHER

## 2021-01-28 RX ORDER — METHYLPREDNISOLONE 4 MG/1
TABLET ORAL
Qty: 1 PACKAGE | Refills: 0 | Status: SHIPPED | OUTPATIENT
Start: 2021-01-28 | End: 2021-09-29

## 2021-01-28 RX ORDER — ATORVASTATIN CALCIUM 80 MG/1
80 TABLET, FILM COATED ORAL NIGHTLY
Qty: 90 TABLET | Refills: 1 | Status: SHIPPED | OUTPATIENT
Start: 2021-01-28 | End: 2021-04-13 | Stop reason: SDUPTHER

## 2021-01-29 ENCOUNTER — TELEPHONE (OUTPATIENT)
Dept: FAMILY MEDICINE | Facility: CLINIC | Age: 81
End: 2021-01-29

## 2021-01-29 LAB
25(OH)D3+25(OH)D2 SERPL-MCNC: 29 NG/ML (ref 30–96)
ALBUMIN SERPL BCP-MCNC: 3.9 G/DL (ref 3.5–5.2)
ALP SERPL-CCNC: 119 U/L (ref 55–135)
ALT SERPL W/O P-5'-P-CCNC: 14 U/L (ref 10–44)
ANION GAP SERPL CALC-SCNC: 11 MMOL/L (ref 8–16)
AST SERPL-CCNC: 21 U/L (ref 10–40)
BILIRUB SERPL-MCNC: 0.5 MG/DL (ref 0.1–1)
BUN SERPL-MCNC: 14 MG/DL (ref 8–23)
CALCIUM SERPL-MCNC: 10 MG/DL (ref 8.7–10.5)
CHLORIDE SERPL-SCNC: 105 MMOL/L (ref 95–110)
CO2 SERPL-SCNC: 24 MMOL/L (ref 23–29)
CREAT SERPL-MCNC: 0.7 MG/DL (ref 0.5–1.4)
CREAT UR-MCNC: 93 MG/DL (ref 15–325)
EST. GFR  (AFRICAN AMERICAN): >60 ML/MIN/1.73 M^2
EST. GFR  (NON AFRICAN AMERICAN): >60 ML/MIN/1.73 M^2
ESTIMATED AVG GLUCOSE: 114 MG/DL (ref 68–131)
GLUCOSE SERPL-MCNC: 36 MG/DL (ref 70–110)
HBA1C MFR BLD: 5.6 % (ref 4–5.6)
POTASSIUM SERPL-SCNC: 4 MMOL/L (ref 3.5–5.1)
PROT SERPL-MCNC: 8.1 G/DL (ref 6–8.4)
PROT UR-MCNC: <7 MG/DL (ref 0–15)
PROT/CREAT UR: NORMAL MG/G{CREAT} (ref 0–0.2)
SODIUM SERPL-SCNC: 140 MMOL/L (ref 136–145)

## 2021-02-10 ENCOUNTER — OFFICE VISIT (OUTPATIENT)
Dept: HEMATOLOGY/ONCOLOGY | Facility: CLINIC | Age: 81
End: 2021-02-10
Payer: MEDICARE

## 2021-02-10 ENCOUNTER — PES CALL (OUTPATIENT)
Dept: ADMINISTRATIVE | Facility: CLINIC | Age: 81
End: 2021-02-10

## 2021-02-10 VITALS
HEART RATE: 60 BPM | SYSTOLIC BLOOD PRESSURE: 165 MMHG | HEIGHT: 64 IN | TEMPERATURE: 99 F | BODY MASS INDEX: 27.85 KG/M2 | DIASTOLIC BLOOD PRESSURE: 68 MMHG | OXYGEN SATURATION: 98 % | WEIGHT: 163.13 LBS

## 2021-02-10 DIAGNOSIS — C50.912 MALIGNANT NEOPLASM OF LEFT BREAST IN FEMALE, ESTROGEN RECEPTOR POSITIVE, UNSPECIFIED SITE OF BREAST: Primary | ICD-10-CM

## 2021-02-10 DIAGNOSIS — Z12.31 ENCOUNTER FOR SCREENING MAMMOGRAM FOR MALIGNANT NEOPLASM OF BREAST: ICD-10-CM

## 2021-02-10 DIAGNOSIS — Z17.0 MALIGNANT NEOPLASM OF LEFT BREAST IN FEMALE, ESTROGEN RECEPTOR POSITIVE, UNSPECIFIED SITE OF BREAST: Primary | ICD-10-CM

## 2021-02-10 PROCEDURE — 3288F PR FALLS RISK ASSESSMENT DOCUMENTED: ICD-10-PCS | Mod: CPTII,S$GLB,, | Performed by: INTERNAL MEDICINE

## 2021-02-10 PROCEDURE — 99499 RISK ADDL DX/OHS AUDIT: ICD-10-PCS | Mod: S$GLB,,, | Performed by: INTERNAL MEDICINE

## 2021-02-10 PROCEDURE — 99499 UNLISTED E&M SERVICE: CPT | Mod: S$GLB,,, | Performed by: INTERNAL MEDICINE

## 2021-02-10 PROCEDURE — 1101F PR PT FALLS ASSESS DOC 0-1 FALLS W/OUT INJ PAST YR: ICD-10-PCS | Mod: CPTII,S$GLB,, | Performed by: INTERNAL MEDICINE

## 2021-02-10 PROCEDURE — 3077F SYST BP >= 140 MM HG: CPT | Mod: CPTII,S$GLB,, | Performed by: INTERNAL MEDICINE

## 2021-02-10 PROCEDURE — 1126F PR PAIN SEVERITY QUANTIFIED, NO PAIN PRESENT: ICD-10-PCS | Mod: S$GLB,,, | Performed by: INTERNAL MEDICINE

## 2021-02-10 PROCEDURE — 1126F AMNT PAIN NOTED NONE PRSNT: CPT | Mod: S$GLB,,, | Performed by: INTERNAL MEDICINE

## 2021-02-10 PROCEDURE — 3078F PR MOST RECENT DIASTOLIC BLOOD PRESSURE < 80 MM HG: ICD-10-PCS | Mod: CPTII,S$GLB,, | Performed by: INTERNAL MEDICINE

## 2021-02-10 PROCEDURE — 99999 PR PBB SHADOW E&M-EST. PATIENT-LVL IV: ICD-10-PCS | Mod: PBBFAC,,, | Performed by: INTERNAL MEDICINE

## 2021-02-10 PROCEDURE — 1101F PT FALLS ASSESS-DOCD LE1/YR: CPT | Mod: CPTII,S$GLB,, | Performed by: INTERNAL MEDICINE

## 2021-02-10 PROCEDURE — 1159F MED LIST DOCD IN RCRD: CPT | Mod: S$GLB,,, | Performed by: INTERNAL MEDICINE

## 2021-02-10 PROCEDURE — 99999 PR PBB SHADOW E&M-EST. PATIENT-LVL IV: CPT | Mod: PBBFAC,,, | Performed by: INTERNAL MEDICINE

## 2021-02-10 PROCEDURE — 3078F DIAST BP <80 MM HG: CPT | Mod: CPTII,S$GLB,, | Performed by: INTERNAL MEDICINE

## 2021-02-10 PROCEDURE — 1159F PR MEDICATION LIST DOCUMENTED IN MEDICAL RECORD: ICD-10-PCS | Mod: S$GLB,,, | Performed by: INTERNAL MEDICINE

## 2021-02-10 PROCEDURE — 3288F FALL RISK ASSESSMENT DOCD: CPT | Mod: CPTII,S$GLB,, | Performed by: INTERNAL MEDICINE

## 2021-02-10 PROCEDURE — 99214 PR OFFICE/OUTPT VISIT, EST, LEVL IV, 30-39 MIN: ICD-10-PCS | Mod: S$GLB,,, | Performed by: INTERNAL MEDICINE

## 2021-02-10 PROCEDURE — 3077F PR MOST RECENT SYSTOLIC BLOOD PRESSURE >= 140 MM HG: ICD-10-PCS | Mod: CPTII,S$GLB,, | Performed by: INTERNAL MEDICINE

## 2021-02-10 PROCEDURE — 99214 OFFICE O/P EST MOD 30 MIN: CPT | Mod: S$GLB,,, | Performed by: INTERNAL MEDICINE

## 2021-03-11 ENCOUNTER — HOSPITAL ENCOUNTER (OUTPATIENT)
Dept: RADIOLOGY | Facility: HOSPITAL | Age: 81
Discharge: HOME | End: 2021-03-11
Attending: INTERNAL MEDICINE
Payer: MEDICARE

## 2021-03-11 DIAGNOSIS — Z12.31 ENCOUNTER FOR SCREENING MAMMOGRAM FOR MALIGNANT NEOPLASM OF BREAST: ICD-10-CM

## 2021-03-11 PROCEDURE — 77067 SCR MAMMO BI INCL CAD: CPT | Mod: TC

## 2021-03-11 PROCEDURE — 77067 PR MAMMO, CAD, SCREENING, BILAT: ICD-10-PCS | Mod: 26,,, | Performed by: RADIOLOGY

## 2021-03-11 PROCEDURE — 77063 BREAST TOMOSYNTHESIS BI: CPT | Mod: 26,,, | Performed by: RADIOLOGY

## 2021-03-11 PROCEDURE — 77067 SCR MAMMO BI INCL CAD: CPT | Mod: 26,,, | Performed by: RADIOLOGY

## 2021-03-11 PROCEDURE — 77063 MAMMO DIGITAL SCREENING BILAT WITH TOMO: ICD-10-PCS | Mod: 26,,, | Performed by: RADIOLOGY

## 2021-03-12 LAB
LEFT EYE DM RETINOPATHY: NEGATIVE
RIGHT EYE DM RETINOPATHY: NEGATIVE

## 2021-04-07 ENCOUNTER — PATIENT OUTREACH (OUTPATIENT)
Dept: ADMINISTRATIVE | Facility: HOSPITAL | Age: 81
End: 2021-04-07

## 2021-04-13 DIAGNOSIS — E55.9 VITAMIN D DEFICIENCY: ICD-10-CM

## 2021-04-13 DIAGNOSIS — E11.69 COMBINED HYPERLIPIDEMIA ASSOCIATED WITH TYPE 2 DIABETES MELLITUS: ICD-10-CM

## 2021-04-13 DIAGNOSIS — E78.2 COMBINED HYPERLIPIDEMIA ASSOCIATED WITH TYPE 2 DIABETES MELLITUS: ICD-10-CM

## 2021-04-13 RX ORDER — GLIPIZIDE 5 MG/1
5 TABLET ORAL
Qty: 60 TABLET | Refills: 5 | Status: SHIPPED | OUTPATIENT
Start: 2021-04-13 | End: 2021-09-29 | Stop reason: SDUPTHER

## 2021-04-13 RX ORDER — ERGOCALCIFEROL 1.25 MG/1
50000 CAPSULE ORAL
Qty: 12 CAPSULE | Refills: 1 | Status: SHIPPED | OUTPATIENT
Start: 2021-04-13 | End: 2021-09-29 | Stop reason: SDUPTHER

## 2021-04-13 RX ORDER — BLOOD-GLUCOSE CONTROL, NORMAL
1 EACH MISCELLANEOUS 2 TIMES DAILY
Qty: 200 EACH | Refills: 3 | Status: SHIPPED | OUTPATIENT
Start: 2021-04-13 | End: 2021-06-22 | Stop reason: SDUPTHER

## 2021-04-13 RX ORDER — ATORVASTATIN CALCIUM 80 MG/1
80 TABLET, FILM COATED ORAL NIGHTLY
Qty: 90 TABLET | Refills: 1 | Status: SHIPPED | OUTPATIENT
Start: 2021-04-13 | End: 2021-09-29 | Stop reason: SDUPTHER

## 2021-04-13 RX ORDER — ISOPROPYL ALCOHOL 70 ML/100ML
1 SWAB TOPICAL
Qty: 100 EACH | Refills: 11 | Status: SHIPPED | OUTPATIENT
Start: 2021-04-13 | End: 2021-06-22 | Stop reason: SDUPTHER

## 2021-04-13 RX ORDER — INSULIN PUMP SYRINGE, 3 ML
1 EACH MISCELLANEOUS 2 TIMES DAILY
Qty: 1 EACH | Refills: 0 | Status: SHIPPED | OUTPATIENT
Start: 2021-04-13 | End: 2022-04-13

## 2021-04-13 RX ORDER — INSULIN PUMP SYRINGE, 3 ML
EACH MISCELLANEOUS
Qty: 1 EACH | Refills: 0 | Status: SHIPPED | OUTPATIENT
Start: 2021-04-13 | End: 2022-04-12

## 2021-05-11 DIAGNOSIS — E11.59 HYPERTENSION ASSOCIATED WITH DIABETES: ICD-10-CM

## 2021-05-11 DIAGNOSIS — I15.2 HYPERTENSION ASSOCIATED WITH DIABETES: ICD-10-CM

## 2021-05-11 RX ORDER — LOSARTAN POTASSIUM 50 MG/1
TABLET ORAL
Qty: 90 TABLET | Refills: 1 | Status: SHIPPED | OUTPATIENT
Start: 2021-05-11 | End: 2021-05-12 | Stop reason: SDUPTHER

## 2021-05-12 DIAGNOSIS — E11.59 HYPERTENSION ASSOCIATED WITH DIABETES: ICD-10-CM

## 2021-05-12 DIAGNOSIS — I15.2 HYPERTENSION ASSOCIATED WITH DIABETES: ICD-10-CM

## 2021-05-13 RX ORDER — LOSARTAN POTASSIUM 50 MG/1
TABLET ORAL
Qty: 90 TABLET | Refills: 1 | Status: SHIPPED | OUTPATIENT
Start: 2021-05-13 | End: 2021-09-29 | Stop reason: SDUPTHER

## 2021-06-23 RX ORDER — ISOPROPYL ALCOHOL 70 ML/100ML
1 SWAB TOPICAL
Qty: 100 EACH | Refills: 11 | Status: SHIPPED | OUTPATIENT
Start: 2021-06-23

## 2021-06-23 RX ORDER — BLOOD-GLUCOSE CONTROL, NORMAL
1 EACH MISCELLANEOUS 2 TIMES DAILY
Qty: 200 EACH | Refills: 3 | Status: SHIPPED | OUTPATIENT
Start: 2021-06-23 | End: 2022-05-09

## 2021-08-13 ENCOUNTER — TELEPHONE (OUTPATIENT)
Dept: FAMILY MEDICINE | Facility: CLINIC | Age: 81
End: 2021-08-13

## 2021-09-14 ENCOUNTER — TELEPHONE (OUTPATIENT)
Dept: FAMILY MEDICINE | Facility: CLINIC | Age: 81
End: 2021-09-14

## 2021-09-29 ENCOUNTER — OFFICE VISIT (OUTPATIENT)
Dept: FAMILY MEDICINE | Facility: CLINIC | Age: 81
End: 2021-09-29
Payer: MEDICARE

## 2021-09-29 ENCOUNTER — LAB VISIT (OUTPATIENT)
Dept: LAB | Facility: HOSPITAL | Age: 81
End: 2021-09-29
Attending: FAMILY MEDICINE
Payer: MEDICARE

## 2021-09-29 VITALS
SYSTOLIC BLOOD PRESSURE: 132 MMHG | BODY MASS INDEX: 27.82 KG/M2 | HEART RATE: 61 BPM | WEIGHT: 162.94 LBS | OXYGEN SATURATION: 97 % | HEIGHT: 64 IN | TEMPERATURE: 98 F | DIASTOLIC BLOOD PRESSURE: 60 MMHG

## 2021-09-29 DIAGNOSIS — E11.59 HYPERTENSION ASSOCIATED WITH DIABETES: ICD-10-CM

## 2021-09-29 DIAGNOSIS — E11.51 TYPE 2 DIABETES MELLITUS WITH PERIPHERAL ARTERY DISEASE: ICD-10-CM

## 2021-09-29 DIAGNOSIS — E78.2 COMBINED HYPERLIPIDEMIA ASSOCIATED WITH TYPE 2 DIABETES MELLITUS: ICD-10-CM

## 2021-09-29 DIAGNOSIS — I15.2 HYPERTENSION ASSOCIATED WITH DIABETES: ICD-10-CM

## 2021-09-29 DIAGNOSIS — Z23 NEED FOR INFLUENZA VACCINATION: ICD-10-CM

## 2021-09-29 DIAGNOSIS — E11.69 COMBINED HYPERLIPIDEMIA ASSOCIATED WITH TYPE 2 DIABETES MELLITUS: ICD-10-CM

## 2021-09-29 DIAGNOSIS — K21.9 GASTROESOPHAGEAL REFLUX DISEASE: ICD-10-CM

## 2021-09-29 DIAGNOSIS — F17.200 TOBACCO USE DISORDER: ICD-10-CM

## 2021-09-29 DIAGNOSIS — E55.9 VITAMIN D DEFICIENCY: ICD-10-CM

## 2021-09-29 LAB
ALBUMIN SERPL BCP-MCNC: 3.6 G/DL (ref 3.5–5.2)
ALP SERPL-CCNC: 135 U/L (ref 55–135)
ALT SERPL W/O P-5'-P-CCNC: 20 U/L (ref 10–44)
ANION GAP SERPL CALC-SCNC: 12 MMOL/L (ref 8–16)
AST SERPL-CCNC: 23 U/L (ref 10–40)
BASOPHILS # BLD AUTO: 0.07 K/UL (ref 0–0.2)
BASOPHILS NFR BLD: 0.9 % (ref 0–1.9)
BILIRUB SERPL-MCNC: 0.5 MG/DL (ref 0.1–1)
BUN SERPL-MCNC: 18 MG/DL (ref 8–23)
CALCIUM SERPL-MCNC: 9.9 MG/DL (ref 8.7–10.5)
CHLORIDE SERPL-SCNC: 104 MMOL/L (ref 95–110)
CHOLEST SERPL-MCNC: 146 MG/DL (ref 120–199)
CHOLEST/HDLC SERPL: 3.1 {RATIO} (ref 2–5)
CO2 SERPL-SCNC: 21 MMOL/L (ref 23–29)
CREAT SERPL-MCNC: 0.8 MG/DL (ref 0.5–1.4)
DIFFERENTIAL METHOD: ABNORMAL
EOSINOPHIL # BLD AUTO: 0.2 K/UL (ref 0–0.5)
EOSINOPHIL NFR BLD: 2.2 % (ref 0–8)
ERYTHROCYTE [DISTWIDTH] IN BLOOD BY AUTOMATED COUNT: 14.7 % (ref 11.5–14.5)
EST. GFR  (AFRICAN AMERICAN): >60 ML/MIN/1.73 M^2
EST. GFR  (NON AFRICAN AMERICAN): >60 ML/MIN/1.73 M^2
ESTIMATED AVG GLUCOSE: 117 MG/DL (ref 68–131)
GLUCOSE SERPL-MCNC: 50 MG/DL (ref 70–110)
HBA1C MFR BLD: 5.7 % (ref 4–5.6)
HCT VFR BLD AUTO: 36.8 % (ref 37–48.5)
HDLC SERPL-MCNC: 47 MG/DL (ref 40–75)
HDLC SERPL: 32.2 % (ref 20–50)
HGB BLD-MCNC: 12.2 G/DL (ref 12–16)
IMM GRANULOCYTES # BLD AUTO: 0.03 K/UL (ref 0–0.04)
IMM GRANULOCYTES NFR BLD AUTO: 0.4 % (ref 0–0.5)
LDLC SERPL CALC-MCNC: 83.4 MG/DL (ref 63–159)
LYMPHOCYTES # BLD AUTO: 2.4 K/UL (ref 1–4.8)
LYMPHOCYTES NFR BLD: 30.1 % (ref 18–48)
MCH RBC QN AUTO: 31.9 PG (ref 27–31)
MCHC RBC AUTO-ENTMCNC: 33.2 G/DL (ref 32–36)
MCV RBC AUTO: 96 FL (ref 82–98)
MONOCYTES # BLD AUTO: 0.7 K/UL (ref 0.3–1)
MONOCYTES NFR BLD: 9.4 % (ref 4–15)
NEUTROPHILS # BLD AUTO: 4.5 K/UL (ref 1.8–7.7)
NEUTROPHILS NFR BLD: 57 % (ref 38–73)
NONHDLC SERPL-MCNC: 99 MG/DL
NRBC BLD-RTO: 0 /100 WBC
PLATELET # BLD AUTO: 163 K/UL (ref 150–450)
PMV BLD AUTO: 12.5 FL (ref 9.2–12.9)
POTASSIUM SERPL-SCNC: 3.6 MMOL/L (ref 3.5–5.1)
PROT SERPL-MCNC: 7.7 G/DL (ref 6–8.4)
RBC # BLD AUTO: 3.83 M/UL (ref 4–5.4)
SODIUM SERPL-SCNC: 137 MMOL/L (ref 136–145)
TRIGL SERPL-MCNC: 78 MG/DL (ref 30–150)
TSH SERPL DL<=0.005 MIU/L-ACNC: 1.78 UIU/ML (ref 0.4–4)
WBC # BLD AUTO: 7.87 K/UL (ref 3.9–12.7)

## 2021-09-29 PROCEDURE — 1100F PTFALLS ASSESS-DOCD GE2>/YR: CPT | Mod: CPTII,S$GLB,, | Performed by: FAMILY MEDICINE

## 2021-09-29 PROCEDURE — 3075F PR MOST RECENT SYSTOLIC BLOOD PRESS GE 130-139MM HG: ICD-10-PCS | Mod: CPTII,S$GLB,, | Performed by: FAMILY MEDICINE

## 2021-09-29 PROCEDURE — 99214 PR OFFICE/OUTPT VISIT, EST, LEVL IV, 30-39 MIN: ICD-10-PCS | Mod: 25,S$GLB,, | Performed by: FAMILY MEDICINE

## 2021-09-29 PROCEDURE — 99499 RISK ADDL DX/OHS AUDIT: ICD-10-PCS | Mod: HCNC,S$GLB,, | Performed by: FAMILY MEDICINE

## 2021-09-29 PROCEDURE — 84443 ASSAY THYROID STIM HORMONE: CPT | Performed by: FAMILY MEDICINE

## 2021-09-29 PROCEDURE — 36415 COLL VENOUS BLD VENIPUNCTURE: CPT | Mod: PO | Performed by: FAMILY MEDICINE

## 2021-09-29 PROCEDURE — 3288F PR FALLS RISK ASSESSMENT DOCUMENTED: ICD-10-PCS | Mod: CPTII,S$GLB,, | Performed by: FAMILY MEDICINE

## 2021-09-29 PROCEDURE — 80053 COMPREHEN METABOLIC PANEL: CPT | Performed by: FAMILY MEDICINE

## 2021-09-29 PROCEDURE — 99999 PR PBB SHADOW E&M-EST. PATIENT-LVL IV: ICD-10-PCS | Mod: PBBFAC,,, | Performed by: FAMILY MEDICINE

## 2021-09-29 PROCEDURE — 99499 UNLISTED E&M SERVICE: CPT | Mod: HCNC,S$GLB,, | Performed by: FAMILY MEDICINE

## 2021-09-29 PROCEDURE — G0008 ADMIN INFLUENZA VIRUS VAC: HCPCS | Mod: S$GLB,,, | Performed by: FAMILY MEDICINE

## 2021-09-29 PROCEDURE — 85025 COMPLETE CBC W/AUTO DIFF WBC: CPT | Performed by: FAMILY MEDICINE

## 2021-09-29 PROCEDURE — 1126F AMNT PAIN NOTED NONE PRSNT: CPT | Mod: CPTII,S$GLB,, | Performed by: FAMILY MEDICINE

## 2021-09-29 PROCEDURE — 90694 VACC AIIV4 NO PRSRV 0.5ML IM: CPT | Mod: S$GLB,,, | Performed by: FAMILY MEDICINE

## 2021-09-29 PROCEDURE — 3288F FALL RISK ASSESSMENT DOCD: CPT | Mod: CPTII,S$GLB,, | Performed by: FAMILY MEDICINE

## 2021-09-29 PROCEDURE — G0008 FLU VACCINE - QUADRIVALENT - ADJUVANTED: ICD-10-PCS | Mod: S$GLB,,, | Performed by: FAMILY MEDICINE

## 2021-09-29 PROCEDURE — 3078F DIAST BP <80 MM HG: CPT | Mod: CPTII,S$GLB,, | Performed by: FAMILY MEDICINE

## 2021-09-29 PROCEDURE — 1159F PR MEDICATION LIST DOCUMENTED IN MEDICAL RECORD: ICD-10-PCS | Mod: CPTII,S$GLB,, | Performed by: FAMILY MEDICINE

## 2021-09-29 PROCEDURE — 1160F PR REVIEW ALL MEDS BY PRESCRIBER/CLIN PHARMACIST DOCUMENTED: ICD-10-PCS | Mod: CPTII,S$GLB,, | Performed by: FAMILY MEDICINE

## 2021-09-29 PROCEDURE — 99999 PR PBB SHADOW E&M-EST. PATIENT-LVL IV: CPT | Mod: PBBFAC,,, | Performed by: FAMILY MEDICINE

## 2021-09-29 PROCEDURE — 1160F RVW MEDS BY RX/DR IN RCRD: CPT | Mod: CPTII,S$GLB,, | Performed by: FAMILY MEDICINE

## 2021-09-29 PROCEDURE — 80061 LIPID PANEL: CPT | Performed by: FAMILY MEDICINE

## 2021-09-29 PROCEDURE — 1159F MED LIST DOCD IN RCRD: CPT | Mod: CPTII,S$GLB,, | Performed by: FAMILY MEDICINE

## 2021-09-29 PROCEDURE — 3078F PR MOST RECENT DIASTOLIC BLOOD PRESSURE < 80 MM HG: ICD-10-PCS | Mod: CPTII,S$GLB,, | Performed by: FAMILY MEDICINE

## 2021-09-29 PROCEDURE — 83036 HEMOGLOBIN GLYCOSYLATED A1C: CPT | Performed by: FAMILY MEDICINE

## 2021-09-29 PROCEDURE — 90694 FLU VACCINE - QUADRIVALENT - ADJUVANTED: ICD-10-PCS | Mod: S$GLB,,, | Performed by: FAMILY MEDICINE

## 2021-09-29 PROCEDURE — 3075F SYST BP GE 130 - 139MM HG: CPT | Mod: CPTII,S$GLB,, | Performed by: FAMILY MEDICINE

## 2021-09-29 PROCEDURE — 1126F PR PAIN SEVERITY QUANTIFIED, NO PAIN PRESENT: ICD-10-PCS | Mod: CPTII,S$GLB,, | Performed by: FAMILY MEDICINE

## 2021-09-29 PROCEDURE — 1100F PR PT FALLS ASSESS DOC 2+ FALLS/FALL W/INJURY/YR: ICD-10-PCS | Mod: CPTII,S$GLB,, | Performed by: FAMILY MEDICINE

## 2021-09-29 PROCEDURE — 99214 OFFICE O/P EST MOD 30 MIN: CPT | Mod: 25,S$GLB,, | Performed by: FAMILY MEDICINE

## 2021-09-29 RX ORDER — ATORVASTATIN CALCIUM 80 MG/1
80 TABLET, FILM COATED ORAL NIGHTLY
Qty: 90 TABLET | Refills: 1 | Status: SHIPPED | OUTPATIENT
Start: 2021-09-29 | End: 2022-03-16

## 2021-09-29 RX ORDER — CARBAMAZEPINE 100 MG/1
100 TABLET, EXTENDED RELEASE ORAL 2 TIMES DAILY
COMMUNITY
End: 2023-10-03

## 2021-09-29 RX ORDER — LOSARTAN POTASSIUM 50 MG/1
TABLET ORAL
Qty: 90 TABLET | Refills: 1 | Status: SHIPPED | OUTPATIENT
Start: 2021-09-29 | End: 2021-11-19

## 2021-09-29 RX ORDER — ERGOCALCIFEROL 1.25 MG/1
50000 CAPSULE ORAL
Qty: 12 CAPSULE | Refills: 1 | Status: SHIPPED | OUTPATIENT
Start: 2021-09-29 | End: 2022-02-03

## 2021-09-29 RX ORDER — PANTOPRAZOLE SODIUM 40 MG/1
40 TABLET, DELAYED RELEASE ORAL DAILY
Qty: 90 TABLET | Refills: 1 | Status: SHIPPED | OUTPATIENT
Start: 2021-09-29 | End: 2022-03-24 | Stop reason: SDUPTHER

## 2021-09-29 RX ORDER — GLIPIZIDE 5 MG/1
5 TABLET ORAL
Qty: 90 TABLET | Refills: 1 | Status: SHIPPED | OUTPATIENT
Start: 2021-09-29 | End: 2022-01-26

## 2021-09-29 RX ORDER — GABAPENTIN 100 MG/1
100 CAPSULE ORAL 3 TIMES DAILY
COMMUNITY
End: 2022-01-26

## 2021-11-15 ENCOUNTER — TELEPHONE (OUTPATIENT)
Dept: FAMILY MEDICINE | Facility: CLINIC | Age: 81
End: 2021-11-15
Payer: MEDICARE

## 2021-11-16 ENCOUNTER — PATIENT OUTREACH (OUTPATIENT)
Dept: ADMINISTRATIVE | Facility: CLINIC | Age: 81
End: 2021-11-16
Payer: MEDICARE

## 2021-11-16 ENCOUNTER — EXTERNAL HOSPITAL ADMISSION (OUTPATIENT)
Dept: ADMINISTRATIVE | Facility: CLINIC | Age: 81
End: 2021-11-16
Payer: MEDICARE

## 2021-11-18 ENCOUNTER — PATIENT OUTREACH (OUTPATIENT)
Dept: ADMINISTRATIVE | Facility: HOSPITAL | Age: 81
End: 2021-11-18
Payer: MEDICARE

## 2021-11-19 ENCOUNTER — OFFICE VISIT (OUTPATIENT)
Dept: FAMILY MEDICINE | Facility: CLINIC | Age: 81
End: 2021-11-19
Payer: MEDICARE

## 2021-11-19 VITALS
SYSTOLIC BLOOD PRESSURE: 118 MMHG | OXYGEN SATURATION: 97 % | TEMPERATURE: 97 F | HEIGHT: 64 IN | DIASTOLIC BLOOD PRESSURE: 58 MMHG | WEIGHT: 154.31 LBS | HEART RATE: 62 BPM | BODY MASS INDEX: 26.34 KG/M2

## 2021-11-19 DIAGNOSIS — F17.200 TOBACCO USE DISORDER: ICD-10-CM

## 2021-11-19 DIAGNOSIS — I50.9 CONGESTIVE HEART FAILURE, UNSPECIFIED HF CHRONICITY, UNSPECIFIED HEART FAILURE TYPE: ICD-10-CM

## 2021-11-19 DIAGNOSIS — E11.51 TYPE 2 DIABETES MELLITUS WITH PERIPHERAL ARTERY DISEASE: ICD-10-CM

## 2021-11-19 PROCEDURE — 99496 TRANSJ CARE MGMT HIGH F2F 7D: CPT | Mod: HCNC,S$GLB,, | Performed by: FAMILY MEDICINE

## 2021-11-19 PROCEDURE — 99999 PR PBB SHADOW E&M-EST. PATIENT-LVL V: CPT | Mod: PBBFAC,HCNC,, | Performed by: FAMILY MEDICINE

## 2021-11-19 PROCEDURE — 99499 UNLISTED E&M SERVICE: CPT | Mod: HCNC,S$GLB,, | Performed by: FAMILY MEDICINE

## 2021-11-19 PROCEDURE — 99999 PR PBB SHADOW E&M-EST. PATIENT-LVL V: ICD-10-PCS | Mod: PBBFAC,HCNC,, | Performed by: FAMILY MEDICINE

## 2021-11-19 PROCEDURE — 99499 RISK ADDL DX/OHS AUDIT: ICD-10-PCS | Mod: HCNC,S$GLB,, | Performed by: FAMILY MEDICINE

## 2021-11-19 PROCEDURE — 99496 TRANSITIONAL CARE MANAGE SERVICE 7 DAY DISCHARGE: ICD-10-PCS | Mod: HCNC,S$GLB,, | Performed by: FAMILY MEDICINE

## 2021-11-19 RX ORDER — IBUPROFEN 200 MG
1 TABLET ORAL DAILY
Qty: 28 PATCH | Refills: 1 | Status: SHIPPED | OUTPATIENT
Start: 2021-11-19 | End: 2022-08-26

## 2021-11-19 RX ORDER — FUROSEMIDE 40 MG/1
40 TABLET ORAL DAILY
COMMUNITY

## 2021-11-19 RX ORDER — SPIRONOLACTONE 25 MG/1
25 TABLET ORAL
COMMUNITY
Start: 2021-11-19 | End: 2023-10-03

## 2021-11-19 RX ORDER — CARVEDILOL 3.12 MG/1
3.12 TABLET ORAL 2 TIMES DAILY
COMMUNITY
End: 2022-01-26

## 2021-11-23 PROBLEM — I50.9 CONGESTIVE HEART FAILURE: Status: ACTIVE | Noted: 2021-11-23

## 2021-12-16 ENCOUNTER — TELEPHONE (OUTPATIENT)
Dept: FAMILY MEDICINE | Facility: CLINIC | Age: 81
End: 2021-12-16
Payer: MEDICARE

## 2022-01-26 ENCOUNTER — OFFICE VISIT (OUTPATIENT)
Dept: FAMILY MEDICINE | Facility: CLINIC | Age: 82
End: 2022-01-26
Payer: MEDICARE

## 2022-01-26 ENCOUNTER — LAB VISIT (OUTPATIENT)
Dept: LAB | Facility: HOSPITAL | Age: 82
End: 2022-01-26
Attending: FAMILY MEDICINE
Payer: MEDICARE

## 2022-01-26 VITALS
OXYGEN SATURATION: 99 % | WEIGHT: 156.31 LBS | TEMPERATURE: 97 F | HEIGHT: 64 IN | HEART RATE: 78 BPM | BODY MASS INDEX: 26.69 KG/M2 | DIASTOLIC BLOOD PRESSURE: 50 MMHG | SYSTOLIC BLOOD PRESSURE: 130 MMHG

## 2022-01-26 DIAGNOSIS — I50.9 CONGESTIVE HEART FAILURE, UNSPECIFIED HF CHRONICITY, UNSPECIFIED HEART FAILURE TYPE: ICD-10-CM

## 2022-01-26 DIAGNOSIS — Z00.00 ANNUAL PHYSICAL EXAM: Primary | ICD-10-CM

## 2022-01-26 DIAGNOSIS — E85.4 AMYLOID HEART DISEASE: ICD-10-CM

## 2022-01-26 DIAGNOSIS — M94.9 DISORDER OF BONE AND CARTILAGE: ICD-10-CM

## 2022-01-26 DIAGNOSIS — E55.9 VITAMIN D DEFICIENCY: ICD-10-CM

## 2022-01-26 DIAGNOSIS — F41.1 GAD (GENERALIZED ANXIETY DISORDER): ICD-10-CM

## 2022-01-26 DIAGNOSIS — Z17.0 MALIGNANT NEOPLASM OF LEFT BREAST IN FEMALE, ESTROGEN RECEPTOR POSITIVE, UNSPECIFIED SITE OF BREAST: ICD-10-CM

## 2022-01-26 DIAGNOSIS — Z78.0 POSTMENOPAUSAL: ICD-10-CM

## 2022-01-26 DIAGNOSIS — E11.59 HYPERTENSION ASSOCIATED WITH DIABETES: ICD-10-CM

## 2022-01-26 DIAGNOSIS — I65.22 STENOSIS OF LEFT CAROTID ARTERY: ICD-10-CM

## 2022-01-26 DIAGNOSIS — E11.51 TYPE 2 DIABETES MELLITUS WITH PERIPHERAL ARTERY DISEASE: ICD-10-CM

## 2022-01-26 DIAGNOSIS — I77.9 CAROTID ARTERY DISEASE, UNSPECIFIED LATERALITY, UNSPECIFIED TYPE: ICD-10-CM

## 2022-01-26 DIAGNOSIS — I73.9 PAD (PERIPHERAL ARTERY DISEASE): ICD-10-CM

## 2022-01-26 DIAGNOSIS — E11.69 COMBINED HYPERLIPIDEMIA ASSOCIATED WITH TYPE 2 DIABETES MELLITUS: ICD-10-CM

## 2022-01-26 DIAGNOSIS — M89.9 DISORDER OF BONE AND CARTILAGE: ICD-10-CM

## 2022-01-26 DIAGNOSIS — E66.3 OVERWEIGHT (BMI 25.0-29.9): ICD-10-CM

## 2022-01-26 DIAGNOSIS — I15.2 HYPERTENSION ASSOCIATED WITH DIABETES: ICD-10-CM

## 2022-01-26 DIAGNOSIS — E78.2 COMBINED HYPERLIPIDEMIA ASSOCIATED WITH TYPE 2 DIABETES MELLITUS: ICD-10-CM

## 2022-01-26 DIAGNOSIS — C50.912 MALIGNANT NEOPLASM OF LEFT BREAST IN FEMALE, ESTROGEN RECEPTOR POSITIVE, UNSPECIFIED SITE OF BREAST: ICD-10-CM

## 2022-01-26 DIAGNOSIS — R41.3 MEMORY DEFICIT: ICD-10-CM

## 2022-01-26 DIAGNOSIS — I43 AMYLOID HEART DISEASE: ICD-10-CM

## 2022-01-26 DIAGNOSIS — F17.200 TOBACCO USE DISORDER: ICD-10-CM

## 2022-01-26 DIAGNOSIS — K63.5 BENIGN COLON POLYP: ICD-10-CM

## 2022-01-26 DIAGNOSIS — I25.10 CORONARY ARTERY DISEASE INVOLVING NATIVE CORONARY ARTERY OF NATIVE HEART WITHOUT ANGINA PECTORIS: ICD-10-CM

## 2022-01-26 DIAGNOSIS — K21.9 GASTROESOPHAGEAL REFLUX DISEASE, UNSPECIFIED WHETHER ESOPHAGITIS PRESENT: ICD-10-CM

## 2022-01-26 LAB
ALBUMIN SERPL BCP-MCNC: 3.6 G/DL (ref 3.5–5.2)
ALP SERPL-CCNC: 159 U/L (ref 55–135)
ALT SERPL W/O P-5'-P-CCNC: 19 U/L (ref 10–44)
ANION GAP SERPL CALC-SCNC: 10 MMOL/L (ref 8–16)
AST SERPL-CCNC: 29 U/L (ref 10–40)
BASOPHILS # BLD AUTO: 0.08 K/UL (ref 0–0.2)
BASOPHILS NFR BLD: 1.1 % (ref 0–1.9)
BILIRUB SERPL-MCNC: 0.4 MG/DL (ref 0.1–1)
BUN SERPL-MCNC: 18 MG/DL (ref 8–23)
CALCIUM SERPL-MCNC: 10.2 MG/DL (ref 8.7–10.5)
CHLORIDE SERPL-SCNC: 100 MMOL/L (ref 95–110)
CHOLEST SERPL-MCNC: 177 MG/DL (ref 120–199)
CHOLEST/HDLC SERPL: 2.9 {RATIO} (ref 2–5)
CO2 SERPL-SCNC: 27 MMOL/L (ref 23–29)
CREAT SERPL-MCNC: 0.8 MG/DL (ref 0.5–1.4)
DIFFERENTIAL METHOD: ABNORMAL
EOSINOPHIL # BLD AUTO: 0.2 K/UL (ref 0–0.5)
EOSINOPHIL NFR BLD: 2.3 % (ref 0–8)
ERYTHROCYTE [DISTWIDTH] IN BLOOD BY AUTOMATED COUNT: 15.4 % (ref 11.5–14.5)
EST. GFR  (AFRICAN AMERICAN): >60 ML/MIN/1.73 M^2
EST. GFR  (NON AFRICAN AMERICAN): >60 ML/MIN/1.73 M^2
ESTIMATED AVG GLUCOSE: 160 MG/DL (ref 68–131)
GLUCOSE SERPL-MCNC: 112 MG/DL (ref 70–110)
HBA1C MFR BLD: 7.2 % (ref 4–5.6)
HCT VFR BLD AUTO: 36.5 % (ref 37–48.5)
HDLC SERPL-MCNC: 61 MG/DL (ref 40–75)
HDLC SERPL: 34.5 % (ref 20–50)
HGB BLD-MCNC: 12.3 G/DL (ref 12–16)
IMM GRANULOCYTES # BLD AUTO: 0.06 K/UL (ref 0–0.04)
IMM GRANULOCYTES NFR BLD AUTO: 0.8 % (ref 0–0.5)
LDLC SERPL CALC-MCNC: 100.4 MG/DL (ref 63–159)
LYMPHOCYTES # BLD AUTO: 2.3 K/UL (ref 1–4.8)
LYMPHOCYTES NFR BLD: 31.1 % (ref 18–48)
MCH RBC QN AUTO: 31.5 PG (ref 27–31)
MCHC RBC AUTO-ENTMCNC: 33.7 G/DL (ref 32–36)
MCV RBC AUTO: 93 FL (ref 82–98)
MONOCYTES # BLD AUTO: 0.7 K/UL (ref 0.3–1)
MONOCYTES NFR BLD: 9.7 % (ref 4–15)
NEUTROPHILS # BLD AUTO: 4.1 K/UL (ref 1.8–7.7)
NEUTROPHILS NFR BLD: 55 % (ref 38–73)
NONHDLC SERPL-MCNC: 116 MG/DL
NRBC BLD-RTO: 0 /100 WBC
PLATELET # BLD AUTO: 171 K/UL (ref 150–450)
PMV BLD AUTO: 11.9 FL (ref 9.2–12.9)
POTASSIUM SERPL-SCNC: 4 MMOL/L (ref 3.5–5.1)
PROT SERPL-MCNC: 8.1 G/DL (ref 6–8.4)
RBC # BLD AUTO: 3.91 M/UL (ref 4–5.4)
SODIUM SERPL-SCNC: 137 MMOL/L (ref 136–145)
TRIGL SERPL-MCNC: 78 MG/DL (ref 30–150)
TSH SERPL DL<=0.005 MIU/L-ACNC: 1.03 UIU/ML (ref 0.4–4)
WBC # BLD AUTO: 7.46 K/UL (ref 3.9–12.7)

## 2022-01-26 PROCEDURE — 1159F MED LIST DOCD IN RCRD: CPT | Mod: HCNC,CPTII,S$GLB, | Performed by: FAMILY MEDICINE

## 2022-01-26 PROCEDURE — 3288F FALL RISK ASSESSMENT DOCD: CPT | Mod: HCNC,CPTII,S$GLB, | Performed by: FAMILY MEDICINE

## 2022-01-26 PROCEDURE — 99397 PER PM REEVAL EST PAT 65+ YR: CPT | Mod: 25,HCNC,S$GLB, | Performed by: FAMILY MEDICINE

## 2022-01-26 PROCEDURE — 1126F AMNT PAIN NOTED NONE PRSNT: CPT | Mod: HCNC,CPTII,S$GLB, | Performed by: FAMILY MEDICINE

## 2022-01-26 PROCEDURE — 3051F HG A1C>EQUAL 7.0%<8.0%: CPT | Mod: HCNC,CPTII,S$GLB, | Performed by: FAMILY MEDICINE

## 2022-01-26 PROCEDURE — 36415 COLL VENOUS BLD VENIPUNCTURE: CPT | Mod: HCNC,PO | Performed by: FAMILY MEDICINE

## 2022-01-26 PROCEDURE — 80053 COMPREHEN METABOLIC PANEL: CPT | Mod: HCNC | Performed by: FAMILY MEDICINE

## 2022-01-26 PROCEDURE — 99499 UNLISTED E&M SERVICE: CPT | Mod: S$GLB,,, | Performed by: FAMILY MEDICINE

## 2022-01-26 PROCEDURE — 85025 COMPLETE CBC W/AUTO DIFF WBC: CPT | Mod: HCNC | Performed by: FAMILY MEDICINE

## 2022-01-26 PROCEDURE — 99397 PR PREVENTIVE VISIT,EST,65 & OVER: ICD-10-PCS | Mod: 25,HCNC,S$GLB, | Performed by: FAMILY MEDICINE

## 2022-01-26 PROCEDURE — 3078F PR MOST RECENT DIASTOLIC BLOOD PRESSURE < 80 MM HG: ICD-10-PCS | Mod: HCNC,CPTII,S$GLB, | Performed by: FAMILY MEDICINE

## 2022-01-26 PROCEDURE — 1101F PR PT FALLS ASSESS DOC 0-1 FALLS W/OUT INJ PAST YR: ICD-10-PCS | Mod: HCNC,CPTII,S$GLB, | Performed by: FAMILY MEDICINE

## 2022-01-26 PROCEDURE — 82652 VIT D 1 25-DIHYDROXY: CPT | Mod: HCNC | Performed by: FAMILY MEDICINE

## 2022-01-26 PROCEDURE — 80061 LIPID PANEL: CPT | Mod: HCNC | Performed by: FAMILY MEDICINE

## 2022-01-26 PROCEDURE — 3078F DIAST BP <80 MM HG: CPT | Mod: HCNC,CPTII,S$GLB, | Performed by: FAMILY MEDICINE

## 2022-01-26 PROCEDURE — 83036 HEMOGLOBIN GLYCOSYLATED A1C: CPT | Mod: HCNC | Performed by: FAMILY MEDICINE

## 2022-01-26 PROCEDURE — 3051F PR MOST RECENT HEMOGLOBIN A1C LEVEL 7.0 - < 8.0%: ICD-10-PCS | Mod: HCNC,CPTII,S$GLB, | Performed by: FAMILY MEDICINE

## 2022-01-26 PROCEDURE — 1160F RVW MEDS BY RX/DR IN RCRD: CPT | Mod: HCNC,CPTII,S$GLB, | Performed by: FAMILY MEDICINE

## 2022-01-26 PROCEDURE — 99406 BEHAV CHNG SMOKING 3-10 MIN: CPT | Mod: HCNC,S$GLB,, | Performed by: FAMILY MEDICINE

## 2022-01-26 PROCEDURE — 84443 ASSAY THYROID STIM HORMONE: CPT | Mod: HCNC | Performed by: FAMILY MEDICINE

## 2022-01-26 PROCEDURE — 1160F PR REVIEW ALL MEDS BY PRESCRIBER/CLIN PHARMACIST DOCUMENTED: ICD-10-PCS | Mod: HCNC,CPTII,S$GLB, | Performed by: FAMILY MEDICINE

## 2022-01-26 PROCEDURE — 99999 PR PBB SHADOW E&M-EST. PATIENT-LVL V: CPT | Mod: PBBFAC,HCNC,, | Performed by: FAMILY MEDICINE

## 2022-01-26 PROCEDURE — 1101F PT FALLS ASSESS-DOCD LE1/YR: CPT | Mod: HCNC,CPTII,S$GLB, | Performed by: FAMILY MEDICINE

## 2022-01-26 PROCEDURE — 1126F PR PAIN SEVERITY QUANTIFIED, NO PAIN PRESENT: ICD-10-PCS | Mod: HCNC,CPTII,S$GLB, | Performed by: FAMILY MEDICINE

## 2022-01-26 PROCEDURE — 3075F SYST BP GE 130 - 139MM HG: CPT | Mod: HCNC,CPTII,S$GLB, | Performed by: FAMILY MEDICINE

## 2022-01-26 PROCEDURE — 99999 PR PBB SHADOW E&M-EST. PATIENT-LVL V: ICD-10-PCS | Mod: PBBFAC,HCNC,, | Performed by: FAMILY MEDICINE

## 2022-01-26 PROCEDURE — 3288F PR FALLS RISK ASSESSMENT DOCUMENTED: ICD-10-PCS | Mod: HCNC,CPTII,S$GLB, | Performed by: FAMILY MEDICINE

## 2022-01-26 PROCEDURE — 1159F PR MEDICATION LIST DOCUMENTED IN MEDICAL RECORD: ICD-10-PCS | Mod: HCNC,CPTII,S$GLB, | Performed by: FAMILY MEDICINE

## 2022-01-26 PROCEDURE — 99499 RISK ADDL DX/OHS AUDIT: ICD-10-PCS | Mod: S$GLB,,, | Performed by: FAMILY MEDICINE

## 2022-01-26 PROCEDURE — 3075F PR MOST RECENT SYSTOLIC BLOOD PRESS GE 130-139MM HG: ICD-10-PCS | Mod: HCNC,CPTII,S$GLB, | Performed by: FAMILY MEDICINE

## 2022-01-26 PROCEDURE — 99406 PR TOBACCO USE CESSATION INTERMEDIATE 3-10 MINUTES: ICD-10-PCS | Mod: HCNC,S$GLB,, | Performed by: FAMILY MEDICINE

## 2022-01-26 RX ORDER — INFLUENZA VACCINE, ADJUVANTED 15; 15; 15; 15 UG/.5ML; UG/.5ML; UG/.5ML; UG/.5ML
INJECTION, SUSPENSION INTRAMUSCULAR
COMMUNITY
Start: 2021-09-29 | End: 2022-08-26

## 2022-01-26 NOTE — PROGRESS NOTES
HISTORY OF PRESENT ILLNESS: Ms. Garcia comes in today non fasting and with taking medications for annual wellness exam. She reports no acute problems today.     END OF LIFE DECISION: She has a living will and does not desire life support.     She continues to follow with hematologist/oncologist Dr. Finley for breast cancer surveillance with last visit on February 10, 2021for surveillance of left breast cancer with mammogram and with 6-month follow up with labs (CMP, CBC) advised.      She saw Dr. Fredi Garces, cardiologist, on November 19, 2021 for amyloid heart disease, dilated cardiomyopathy, nonobstructive CAD, moderate carotid artery disease, left bundle branch block, hypertension surveillance but also saw NP Jaqueline Bailon with cardiology on November 24, 2021 for hypokalemia, essential hypertension, acute diastolic CHF, PAD and acute systolic congestive heart failure.                              She saw Dr. Mason, neurologist, on November 5, 2019 for Stenosis of left carotid artery.                        She saw Chavo Gray with psychiatry on December 8, 2021 for abnormal levels of other serum enzymes, deficiency of other specified B group vitamins, trigeminal  neuralgia, and other specified dorsopathies, site unspecified. She saw Dr. Elmer Gupta, psychiatrist, on September 1, 2020 for surveillance of generalized anxiety disorder,  memory deficits and insomnia.                    She continues to occasionally smoke and states she continues to work on quitting as she uses Nicotine patches.    Current Outpatient Medications   Medication Sig    alcohol swabs PadM Apply 1 each topically as needed.    aspirin (ECOTRIN) 81 MG EC tablet Take 81 mg by mouth daily as needed for Pain.    atorvastatin (LIPITOR) 80 MG tablet Take 1 tablet (80 mg total) by mouth every evening.    blood glucose control, normal (ACCU-CHEK SMARTVIEW CONTRL SOL) Soln 1 each by Misc.(Non-Drug; Combo Route) route 2 (two) times  daily.    blood sugar diagnostic Strp 1 strip by Misc.(Non-Drug; Combo Route) route 2 (two) times daily.    blood-glucose meter kit Use as instructed    carBAMazepine (TEGRETOL XR) 100 MG 12 hr tablet Take 100 mg by mouth 2 (two) times daily.    ergocalciferol (ERGOCALCIFEROL) 50,000 unit Cap Take 1 capsule (50,000 Units total) by mouth every 7 days.    furosemide (LASIX) 40 MG tablet Take 40 mg by mouth once daily.    lancets (TRUEPLUS LANCETS) 30 gauge Misc Inject 1 lancet into the skin 2 (two) times daily.    nicotine (NICODERM CQ) 14 mg/24 hr Place 1 patch onto the skin once daily.    pantoprazole (PROTONIX) 40 MG tablet Take 1 tablet (40 mg total) by mouth once daily.    sacubitriL-valsartan (ENTRESTO) 24-26 mg per tablet Take 1 tablet by mouth 2 (two) times daily.    spironolactone (ALDACTONE) 25 MG tablet Take 25 mg by mouth.    TRUEPLUS LANCETS 28 gauge Misc USE TO TEST BLOOD SUGAR TWICE DAILY    FLUAD QUAD 2021-22,65Y UP,,PF, 60 mcg (15 mcg x 4)/0.5 mL Syrg     memantine (NAMENDA) 5 MG Tab Take 5 mg by mouth 2 (two) times daily.    nystatin (MYCOSTATIN) cream Apply topically 2 (two) times daily as needed. For up to 3 weeks as directed (Patient not taking: No sig reported)        SCREENINGS:   Cholesterol: September 29, 2021.   FFS/Colonoscopy: July 7, 2020 - benign colon polyp, diverticulosis; repeat in 5 years.   Mammogram (with Dr. Finley): March 11, 2021 - benign.   GYN Exam (Breast exam) with Dr. Finley: July 22, 2020.   Dexa Scan: March 16, 2018 - osteopenia.   Eye Exam: July 19, 2017 with Dr. Cruz. She declines.  Dental Exam: Years ago per patient. She wears top dentures only. She declines.  PPD: Negative in the past.  Immunizations: Td/Tdap - less than 10 years ago per patient.  Gardasil - N./A.  Zostavax - June 14, 2012.  Shingrix - Never. Advised insurance covered benefit on at local pharmacy.  Pneumovax - March 26, 2014.     Prevnar-13 shot - March 29, 2016.    Seasonal Flu  - September 29, 2021.    Covid-19 (Moderna) vaccine series - February 3, 2021, March 3, 2021, December 16, 2021.     ROS:  GENERAL: Denies fever, chills, fatigue or unusual weight change. Appetite good. Weight 70 kg (154 lb 5.2 oz) at November 19, 2021 visit. Reports exercises some times. Reports monitors diet.    SKIN: Denies rashes, itching, changes in mole, color or texture of skin or easy bruising.   HEAD: Denies headaches or recent head trauma.   EYES: Denies change in vision, pain, diplopia, redness or discharge. Wears glasses.  EARS: Denies ear pain, discharge, vertigo or decreased hearing.   NOSE: Denies loss of smell, epistaxis or rhinitis.  MOUTH & THROAT: Denies hoarseness or change in voice. Denies excessive gum bleeding or mouth sores. Denies sore throat.  NODES: Denies swollen glands.  CHEST: Denies BRITO, wheezing, cough, or sputum production.  CARDIOVASCULAR: Denies PND, chest pain, orthopnea or reduced exercise tolerance. Denies palpitations. Reports occasionally performs home blood pressure checks with fluctuating levels.  ABDOMEN: Denies constipation, diarrhea, nausea, vomiting, abdominal pain, or blood in stool.  URINARY: Denies flank pain, dysuria or hematuria.  GENITOURINARY: Denies flank pain, dysuria, frequency or hematuria. Performs monthly breast self exams. See history of present illness.  ENDOCRINE: Denies thyroid problems. Reports occasionally performs home glucose checks with levels ranging 90's. Reports her cardiologist told her to stop taking Glipizide.  HEME/LYMPH: Denies bleeding problems.  PERIPHERAL VASCULAR:Denies claudication or cyanosis.  MUSCULOSKELETAL: Denies pain, stiffness, edema.   NEUROLOGIC: Denies history of seizures, tremors, paralysis, alteration of gait or coordination. Reports has stable, chronic memory issue but reports she no longer takes Namenda and reports scheduled to see neurologist soon. See history of present illness.  PSYCHIATRIC: Denies mood swings,  "depression, anxiety, homicidal or suicidal thoughts. Denies sleep problems.     PE:   BP (!) 130/50   Pulse 78   Temp 96.8 °F (36 °C)   Ht 5' 4" (1.626 m)   Wt 70.9 kg (156 lb 4.9 oz)   SpO2 99%   BMI 26.83 kg/m²   APPEARANCE: Well nourished, well developed female, overweight, elderly and pleasant, alert and oriented in no acute distress.   HEAD: Nontender. Full range of motion.  EYES: PERRL, conjunctiva pink, lids no edema. She wears glasses.   EARS: External canal patent, no swelling or redness. TM's shiny and clear.  NOSE: Mucosa and turbinates pink, not swollen. No discharge. Nontender sinuses.  THROAT: No pharyngeal erythema or exudate. No stridor. She has top dentures.   NECK: Supple, no mass, thyroid not enlarged.  NODES: No cervica lymph node enlargement.  CHEST: Normal respiratory effort. Lungs clear to auscultation.  CARDIOVASCULAR: Bradycardia today with normal S1, S2. No rubs, murmurs or gallops. PMI not displaced. Left carotid bruit not heard today. Feet look okay without ulcerations or skin breaks. Chronic slightly decreased pedal pulses palpable bilaterally. No edema.  ABDOMEN: Bowel sounds present. Not distended. Soft. No tenderness, masses or organomegaly.  BREAST EXAM: Not examined per patient request.   PELVIC EXAM: Bimanual examination not examined as patient declines and as patient has had JACQUELINE/BSO due to noncancerous reasons.   RECTAL EXAM: Not examined per patient request.   MUSCULOSKELETAL: No joint deformities or stiffness. She is ambulatory without problems.  SKIN: No rashes or suspicious lesions, normal color and turgor.  NEUROLOGIC: Cranial Nerves: II-XII grossly intact. DTR's: Knees, Ankles 2+ and equal bilaterally. Monofilament test unremarkable. Gait & Posture: Normal gait and fine motion.  PSYCHIATRIC: Patient alert, oriented x 3. Mood/Affect normal without acute anxiety depression noted. Judgment/insight good as she makes appropriate decisions during today's examination but " chronic, slight decreased memory deficit noted.    Protective Sensation (w/ 10 gram monofilament):  Right: Intact  Left: Intact    Visual Inspection:  Normal -  Bilateral    Pedal Pulses:   Right: Diminished - chronic.  Left: Diminished - chronic.    Posterior tibialis:   Right:Diminished - chronic.  Left: Diminished - chronic.       ASSESSMENT:    ICD-10-CM ICD-9-CM    1. Annual physical exam  Z00.00 V70.0    2. Hypertension associated with diabetes  E11.59 250.80 Comprehensive Metabolic Panel    I15.2 401.9 Lipid Panel      CBC Auto Differential      TSH   3. Type 2 diabetes mellitus with peripheral artery disease  E11.51 250.70 Hemoglobin A1C     443.81 Microalbumin/Creatinine Ratio, Urine   4. Combined hyperlipidemia associated with type 2 diabetes mellitus  E11.69 250.80 Comprehensive Metabolic Panel    E78.2 272.2 Lipid Panel   5. Congestive heart failure, unspecified HF chronicity, unspecified heart failure type  I50.9 428.0    6. Coronary artery disease involving native coronary artery of native heart without angina pectoris  I25.10 414.01    7. Amyloid heart disease  E85.4 277.39     I43 425.7    8. Carotid artery disease, unspecified laterality, unspecified type  I77.9 447.9    9. Stenosis of left carotid artery  I65.22 433.10    10. PAD (peripheral artery disease)  I73.9 443.9    11. Gastroesophageal reflux disease, unspecified whether esophagitis present  K21.9 530.81    12. Benign colon polyp  K63.5 211.3    13. Vitamin D deficiency  E55.9 268.9 CALCITRIOL   14. Disorder of bone and cartilage  M89.9 733.90     M94.9     15. Malignant neoplasm of left breast in female, estrogen receptor positive, unspecified site of breast  C50.912 174.9     Z17.0 V86.0    16. Memory deficit  R41.3 780.93    17. BRAYAN (generalized anxiety disorder)  F41.1 300.02    18. Tobacco use disorder  F17.200 305.1    19. Overweight (BMI 25.0-29.9)  E66.3 278.02    20. Postmenopausal  Z78.0 V49.81        PLAN:  1. Age-appropriate  counseling-appropriate low-sodium, low-cholesterol, low carbohydrate diet and exercise daily, monthly breast self exam, annual wellness examination.   2. Patient advised to call for results.  3. Continue current medications.  4. Annual eye and dental examinations; I offered to schedule patient for eye appointment but she declined.  5. Keep follow up with specialists.  6. Smoking cessation advised. 3- 5 minutes spent in counseling and discussion regarding smoking cessation, risks, etc; patient stated she plans to continue to work on quitting by using Nicotine patches.  7. Keep follow up with specialists.  8. Follow up in about 6 months (around 7/26/2022) for hypertension and diabetes follow up.

## 2022-01-28 LAB — 1,25(OH)2D3 SERPL-MCNC: 103 PG/ML (ref 20–79)

## 2022-01-30 PROBLEM — E85.4 AMYLOID HEART DISEASE: Status: ACTIVE | Noted: 2022-01-30

## 2022-01-30 PROBLEM — I43 AMYLOID HEART DISEASE: Status: ACTIVE | Noted: 2022-01-30

## 2022-01-30 PROBLEM — F41.1 GAD (GENERALIZED ANXIETY DISORDER): Status: ACTIVE | Noted: 2022-01-30

## 2022-01-30 PROBLEM — E66.3 OVERWEIGHT (BMI 25.0-29.9): Status: ACTIVE | Noted: 2022-01-30

## 2022-02-27 DIAGNOSIS — E55.9 VITAMIN D DEFICIENCY: ICD-10-CM

## 2022-02-27 RX ORDER — ERGOCALCIFEROL 1.25 MG/1
50000 CAPSULE ORAL
Qty: 6 CAPSULE | Refills: 1 | Status: SHIPPED | OUTPATIENT
Start: 2022-02-27 | End: 2023-12-04 | Stop reason: SDUPTHER

## 2022-03-08 DIAGNOSIS — E11.69 COMBINED HYPERLIPIDEMIA ASSOCIATED WITH TYPE 2 DIABETES MELLITUS: ICD-10-CM

## 2022-03-08 DIAGNOSIS — E78.2 COMBINED HYPERLIPIDEMIA ASSOCIATED WITH TYPE 2 DIABETES MELLITUS: ICD-10-CM

## 2022-03-08 NOTE — TELEPHONE ENCOUNTER
No new care gaps identified.  Powered by [employer]. Reference number: [id number].   3/08/2022 2:52:27 PM CST

## 2022-03-16 NOTE — TELEPHONE ENCOUNTER
Refill Authorization Note   Danyelle Garcia  is requesting a refill authorization.  Brief Assessment and Rationale for Refill:  Approve     Medication Therapy Plan:       Medication Reconciliation Completed: No   Comments:   --->Care Gap information included below if applicable.       Requested Prescriptions   Pending Prescriptions Disp Refills    atorvastatin (LIPITOR) 80 MG tablet [Pharmacy Med Name: ATORVASTATIN CALCIUM 80 MG Tablet] 90 tablet 3     Sig: TAKE 1 TABLET (80 MG TOTAL) BY MOUTH EVERY EVENING.       Cardiovascular:  Antilipid - Statins Passed - 3/16/2022  2:15 PM        Passed - Patient is at least 18 years old        Passed - Valid encounter within last 15 months     Recent Visits  Date Type Provider Dept   01/26/22 [facility] Visit [facility]   11/19/21 Office Visit [facility]   09/29/21 Office Visit [facility]   01/28/21 Office Visit [facility]   06/24/20 Office Visit Dilma Chan MD [facility]   Showing recent visits within past 720 days and meeting all other requirements  Future Appointments  No visits were found meeting these conditions.  Showing future appointments within next 150 days and meeting all other requirements      Future Appointments              In 4 months Dilma Chan MD [facility]                Passed - ALT is 131 or below and within 360 days     ALT   Date Value Ref Range Status   01/26/2022 19 10 - 44 U/L Final   09/29/2021 20 10 - 44 U/L Final   01/28/2021 14 10 - 44 U/L Final              Passed - AST is 119 or below and within 360 days     AST   Date Value Ref Range Status   01/26/2022 29 10 - 40 U/L Final   09/29/2021 23 10 - 40 U/L Final   01/28/2021 21 10 - 40 U/L Final              Passed - Total Cholesterol within 360 days     Lab Results   Component Value Date    CHOL 177 01/26/2022    CHOL 146  09/29/2021    CHOL 209 (H) 06/24/2020              Passed - LDL within 360 days     LDL Cholesterol   Date Value Ref Range Status   01/26/2022 100.4 63.0 - 159.0 mg/dL Final     Comment:     The National Cholesterol Education Program (NCEP) has set the  following guidelines (reference values) for LDL Cholesterol:  Optimal.......................<130 mg/dL  Borderline High...............130-159 mg/dL  High..........................160-189 mg/dL  Very High.....................>190 mg/dL              Passed - HDL within 360 days     HDL   Date Value Ref Range Status   01/26/2022 61 40 - 75 mg/dL Final     Comment:     The National Cholesterol Education Program (NCEP) has set the  following guidelines (reference values) for HDL Cholesterol:  Low...............<40 mg/dL  Optimal...........>60 mg/dL              Passed - Triglycerides within 360 days     Lab Results   Component Value Date    TRIG 78 01/26/2022    TRIG 78 09/29/2021    TRIG 74 06/24/2020                  Appointments  past 12m or future 3m with PCP    Date Provider   Last Visit   1/26/2022 Dilma Chan MD   Next Visit   7/27/2022 Dilma Chan MD   ED visits in past 90 days: 0     Note composed:2:17 PM 03/16/2022

## 2022-03-17 RX ORDER — ATORVASTATIN CALCIUM 80 MG/1
80 TABLET, FILM COATED ORAL NIGHTLY
Qty: 90 TABLET | Refills: 3 | Status: SHIPPED | OUTPATIENT
Start: 2022-03-17 | End: 2023-02-16 | Stop reason: SDUPTHER

## 2022-05-02 ENCOUNTER — TELEPHONE (OUTPATIENT)
Dept: FAMILY MEDICINE | Facility: CLINIC | Age: 82
End: 2022-05-02
Payer: MEDICARE

## 2022-05-02 NOTE — TELEPHONE ENCOUNTER
----- Message from Carlos Benavidez sent at 5/2/2022 10:40 AM CDT -----  Contact: Danyelle Aviles is requesting orders for mammo. Please call her back at [phone].        Thanks  DD

## 2022-05-08 NOTE — TELEPHONE ENCOUNTER
No new care gaps identified.  [facility] Embedded Care Gaps. Reference number: [id number]. 5/08/2022   5:46:49 AM REBECCAT

## 2022-05-09 RX ORDER — LANCETS 30 GAUGE
EACH MISCELLANEOUS
Qty: 200 EACH | Refills: 3 | Status: SHIPPED | OUTPATIENT
Start: 2022-05-09

## 2022-05-09 RX ORDER — CALCIUM CITRATE/VITAMIN D3 200MG-6.25
TABLET ORAL
Qty: 200 STRIP | Refills: 3 | Status: SHIPPED | OUTPATIENT
Start: 2022-05-09

## 2022-05-09 NOTE — TELEPHONE ENCOUNTER
Refill Routing Note   Medication(s) are not appropriate for processing by [facility] for the following reason(s):      - Patient displays non-adherence to medications (has run out of medication supply over 6 months ago)    [facility] action(s):  Defer          Medication reconciliation completed: No     Appointments  past 12m or future 3m with PCP    Date Provider   Last Visit   1/26/2022 Dilma Chan MD   Next Visit   7/27/2022 Dilma Chan MD   ED visits in past 90 days: 0        Note composed:11:47 AM 05/09/2022

## 2022-05-18 ENCOUNTER — TELEPHONE (OUTPATIENT)
Dept: FAMILY MEDICINE | Facility: CLINIC | Age: 82
End: 2022-05-18
Payer: MEDICARE

## 2022-05-18 DIAGNOSIS — Z12.31 OTHER SCREENING MAMMOGRAM: Primary | ICD-10-CM

## 2022-05-18 NOTE — TELEPHONE ENCOUNTER
----- Message from Toña Gonzalez sent at 5/18/2022  9:11 AM CDT -----  Contact: self/ [phone]  Patient to calling to find out when will you schedule. her  yearly mammogram she is now 2 months behind, Please call back [phone].      Thanks

## 2022-06-07 ENCOUNTER — HOSPITAL ENCOUNTER (OUTPATIENT)
Dept: RADIOLOGY | Facility: HOSPITAL | Age: 82
Discharge: HOME | End: 2022-06-07
Attending: FAMILY MEDICINE
Payer: MEDICARE

## 2022-06-07 VITALS — WEIGHT: 156 LBS | HEIGHT: 64 IN | BODY MASS INDEX: 26.63 KG/M2

## 2022-06-07 DIAGNOSIS — Z12.31 OTHER SCREENING MAMMOGRAM: ICD-10-CM

## 2022-06-07 PROCEDURE — 77067 SCR MAMMO BI INCL CAD: CPT | Mod: 26,,, | Performed by: RADIOLOGY

## 2022-06-07 PROCEDURE — 77067 SCR MAMMO BI INCL CAD: CPT | Mod: TC

## 2022-06-07 PROCEDURE — 77063 MAMMO DIGITAL SCREENING BILAT WITH TOMO: ICD-10-PCS | Mod: 26,,, | Performed by: RADIOLOGY

## 2022-06-07 PROCEDURE — 77063 BREAST TOMOSYNTHESIS BI: CPT | Mod: TC

## 2022-06-07 PROCEDURE — 77067 MAMMO DIGITAL SCREENING BILAT WITH TOMO: ICD-10-PCS | Mod: 26,,, | Performed by: RADIOLOGY

## 2022-06-07 PROCEDURE — 77063 BREAST TOMOSYNTHESIS BI: CPT | Mod: 26,,, | Performed by: RADIOLOGY

## 2022-06-21 ENCOUNTER — TELEPHONE (OUTPATIENT)
Dept: FAMILY MEDICINE | Facility: CLINIC | Age: 82
End: 2022-06-21
Payer: MEDICARE

## 2022-06-21 NOTE — TELEPHONE ENCOUNTER
----- Message from Madisyn Shah sent at 6/21/2022  1:36 PM CDT -----  Type:  Patient Returning Call    Who Called:patient  Who Left Message for Patient:nurse  Does the patient know what this is regarding?:appt that was change to 7/23, pt states transportation do not run on weekend  Would the patient rather a call back or a response via [employer]?call back  Best Call Back Number:[phone]  Additional Information: pt need another day before next available in Nov

## 2022-08-26 ENCOUNTER — LAB VISIT (OUTPATIENT)
Dept: LAB | Facility: HOSPITAL | Age: 82
End: 2022-08-26
Attending: FAMILY MEDICINE
Payer: MEDICARE

## 2022-08-26 ENCOUNTER — OFFICE VISIT (OUTPATIENT)
Dept: FAMILY MEDICINE | Facility: CLINIC | Age: 82
End: 2022-08-26
Payer: MEDICARE

## 2022-08-26 VITALS
OXYGEN SATURATION: 96 % | HEIGHT: 64 IN | RESPIRATION RATE: 18 BRPM | WEIGHT: 158.06 LBS | SYSTOLIC BLOOD PRESSURE: 130 MMHG | HEART RATE: 68 BPM | BODY MASS INDEX: 26.98 KG/M2 | DIASTOLIC BLOOD PRESSURE: 63 MMHG

## 2022-08-26 DIAGNOSIS — E11.59 HYPERTENSION ASSOCIATED WITH DIABETES: ICD-10-CM

## 2022-08-26 DIAGNOSIS — I73.9 PAD (PERIPHERAL ARTERY DISEASE): ICD-10-CM

## 2022-08-26 DIAGNOSIS — R05.9 COUGH: ICD-10-CM

## 2022-08-26 DIAGNOSIS — E78.2 COMBINED HYPERLIPIDEMIA ASSOCIATED WITH TYPE 2 DIABETES MELLITUS: ICD-10-CM

## 2022-08-26 DIAGNOSIS — I25.10 CORONARY ARTERY DISEASE INVOLVING NATIVE CORONARY ARTERY OF NATIVE HEART WITHOUT ANGINA PECTORIS: ICD-10-CM

## 2022-08-26 DIAGNOSIS — E11.51 TYPE 2 DIABETES MELLITUS WITH PERIPHERAL ARTERY DISEASE: ICD-10-CM

## 2022-08-26 DIAGNOSIS — I15.2 HYPERTENSION ASSOCIATED WITH DIABETES: Primary | ICD-10-CM

## 2022-08-26 DIAGNOSIS — E66.3 OVERWEIGHT (BMI 25.0-29.9): ICD-10-CM

## 2022-08-26 DIAGNOSIS — E11.69 COMBINED HYPERLIPIDEMIA ASSOCIATED WITH TYPE 2 DIABETES MELLITUS: ICD-10-CM

## 2022-08-26 DIAGNOSIS — R41.3 MEMORY DEFICIT: ICD-10-CM

## 2022-08-26 DIAGNOSIS — Z85.3 HISTORY OF LEFT BREAST CANCER: ICD-10-CM

## 2022-08-26 DIAGNOSIS — F17.200 TOBACCO USE DISORDER: ICD-10-CM

## 2022-08-26 DIAGNOSIS — I15.2 HYPERTENSION ASSOCIATED WITH DIABETES: ICD-10-CM

## 2022-08-26 DIAGNOSIS — I50.9 CONGESTIVE HEART FAILURE, UNSPECIFIED HF CHRONICITY, UNSPECIFIED HEART FAILURE TYPE: ICD-10-CM

## 2022-08-26 DIAGNOSIS — E11.59 HYPERTENSION ASSOCIATED WITH DIABETES: Primary | ICD-10-CM

## 2022-08-26 LAB
ALBUMIN SERPL BCP-MCNC: 3.8 G/DL (ref 3.5–5.2)
ALP SERPL-CCNC: 138 U/L (ref 55–135)
ALT SERPL W/O P-5'-P-CCNC: 12 U/L (ref 10–44)
ANION GAP SERPL CALC-SCNC: 10 MMOL/L (ref 8–16)
AST SERPL-CCNC: 19 U/L (ref 10–40)
BILIRUB SERPL-MCNC: 0.3 MG/DL (ref 0.1–1)
BUN SERPL-MCNC: 15 MG/DL (ref 8–23)
CALCIUM SERPL-MCNC: 10.2 MG/DL (ref 8.7–10.5)
CHLORIDE SERPL-SCNC: 102 MMOL/L (ref 95–110)
CO2 SERPL-SCNC: 25 MMOL/L (ref 23–29)
CREAT SERPL-MCNC: 0.8 MG/DL (ref 0.5–1.4)
EST. GFR  (NO RACE VARIABLE): >60 ML/MIN/1.73 M^2
ESTIMATED AVG GLUCOSE: 131 MG/DL (ref 68–131)
GLUCOSE SERPL-MCNC: 101 MG/DL (ref 70–110)
HBA1C MFR BLD: 6.2 % (ref 4–5.6)
POTASSIUM SERPL-SCNC: 4.3 MMOL/L (ref 3.5–5.1)
PROT SERPL-MCNC: 8.2 G/DL (ref 6–8.4)
SODIUM SERPL-SCNC: 137 MMOL/L (ref 136–145)

## 2022-08-26 PROCEDURE — 1159F PR MEDICATION LIST DOCUMENTED IN MEDICAL RECORD: ICD-10-PCS | Mod: CPTII,S$GLB,, | Performed by: FAMILY MEDICINE

## 2022-08-26 PROCEDURE — 36415 COLL VENOUS BLD VENIPUNCTURE: CPT | Mod: PO | Performed by: FAMILY MEDICINE

## 2022-08-26 PROCEDURE — 99999 PR PBB SHADOW E&M-EST. PATIENT-LVL IV: CPT | Mod: PBBFAC,,, | Performed by: FAMILY MEDICINE

## 2022-08-26 PROCEDURE — 1126F AMNT PAIN NOTED NONE PRSNT: CPT | Mod: CPTII,S$GLB,, | Performed by: FAMILY MEDICINE

## 2022-08-26 PROCEDURE — 3075F SYST BP GE 130 - 139MM HG: CPT | Mod: CPTII,S$GLB,, | Performed by: FAMILY MEDICINE

## 2022-08-26 PROCEDURE — 1126F PR PAIN SEVERITY QUANTIFIED, NO PAIN PRESENT: ICD-10-PCS | Mod: CPTII,S$GLB,, | Performed by: FAMILY MEDICINE

## 2022-08-26 PROCEDURE — 1159F MED LIST DOCD IN RCRD: CPT | Mod: CPTII,S$GLB,, | Performed by: FAMILY MEDICINE

## 2022-08-26 PROCEDURE — 1160F RVW MEDS BY RX/DR IN RCRD: CPT | Mod: CPTII,S$GLB,, | Performed by: FAMILY MEDICINE

## 2022-08-26 PROCEDURE — 3075F PR MOST RECENT SYSTOLIC BLOOD PRESS GE 130-139MM HG: ICD-10-PCS | Mod: CPTII,S$GLB,, | Performed by: FAMILY MEDICINE

## 2022-08-26 PROCEDURE — 99214 PR OFFICE/OUTPT VISIT, EST, LEVL IV, 30-39 MIN: ICD-10-PCS | Mod: S$GLB,,, | Performed by: FAMILY MEDICINE

## 2022-08-26 PROCEDURE — 1160F PR REVIEW ALL MEDS BY PRESCRIBER/CLIN PHARMACIST DOCUMENTED: ICD-10-PCS | Mod: CPTII,S$GLB,, | Performed by: FAMILY MEDICINE

## 2022-08-26 PROCEDURE — 1101F PT FALLS ASSESS-DOCD LE1/YR: CPT | Mod: CPTII,S$GLB,, | Performed by: FAMILY MEDICINE

## 2022-08-26 PROCEDURE — 3078F DIAST BP <80 MM HG: CPT | Mod: CPTII,S$GLB,, | Performed by: FAMILY MEDICINE

## 2022-08-26 PROCEDURE — 99214 OFFICE O/P EST MOD 30 MIN: CPT | Mod: S$GLB,,, | Performed by: FAMILY MEDICINE

## 2022-08-26 PROCEDURE — 83036 HEMOGLOBIN GLYCOSYLATED A1C: CPT | Performed by: FAMILY MEDICINE

## 2022-08-26 PROCEDURE — 3288F PR FALLS RISK ASSESSMENT DOCUMENTED: ICD-10-PCS | Mod: CPTII,S$GLB,, | Performed by: FAMILY MEDICINE

## 2022-08-26 PROCEDURE — 99999 PR PBB SHADOW E&M-EST. PATIENT-LVL IV: ICD-10-PCS | Mod: PBBFAC,,, | Performed by: FAMILY MEDICINE

## 2022-08-26 PROCEDURE — 1101F PR PT FALLS ASSESS DOC 0-1 FALLS W/OUT INJ PAST YR: ICD-10-PCS | Mod: CPTII,S$GLB,, | Performed by: FAMILY MEDICINE

## 2022-08-26 PROCEDURE — 3078F PR MOST RECENT DIASTOLIC BLOOD PRESSURE < 80 MM HG: ICD-10-PCS | Mod: CPTII,S$GLB,, | Performed by: FAMILY MEDICINE

## 2022-08-26 PROCEDURE — 3288F FALL RISK ASSESSMENT DOCD: CPT | Mod: CPTII,S$GLB,, | Performed by: FAMILY MEDICINE

## 2022-08-26 PROCEDURE — 80053 COMPREHEN METABOLIC PANEL: CPT | Performed by: FAMILY MEDICINE

## 2022-08-26 RX ORDER — BENZONATATE 100 MG/1
100 CAPSULE ORAL 3 TIMES DAILY PRN
Qty: 30 CAPSULE | Refills: 0 | Status: SHIPPED | OUTPATIENT
Start: 2022-08-26 | End: 2022-10-06

## 2022-08-26 RX ORDER — MEMANTINE HYDROCHLORIDE 5 MG/1
5 TABLET ORAL 2 TIMES DAILY
Qty: 60 TABLET | Refills: 5 | Status: SHIPPED | OUTPATIENT
Start: 2022-08-26 | End: 2023-02-16 | Stop reason: SDUPTHER

## 2022-08-26 NOTE — PROGRESS NOTES
Danyelle Garcia    Chief Complaint   Patient presents with    Hypertension    Diabetes    Follow-up       History of Present Illness:   Ms. Gracia comes in today for hypertension and diabetes follow-up.  She is not fasting but has taken medications today.  She states she monitors her diet sometimes but does not exercise.  She states she continues to occasionally smoke cigarettes as she tries to quit.  She states she performs home glucose checks daily with levels ranging 130's and occasionally performs home blood pressure checks but states she cannot recall the numbers.      She reports having chronic back pain and is followed by pain management with last visit on June 22, 2022.    She continues to follow with hematologist/oncologist Dr. Finley for breast cancer surveillance with last visit on February 10, 2021 for surveillance of left breast cancer with mammogram and with 6-month follow up with labs (CMP, CBC) advised.      She saw Dr. Fredi Garces, cardiologist with [facility], on August 24, 2022 for surveillance of CHF, amyloid heart disease, dilated cardiomyopathy, nonobstructive CAD, moderate carotid artery disease, left bundle branch block, hypertension surveillance with follow-up advised and scheduled for March 2023      She saw Dr. Mason, neurologist, on November 5, 2019 for Stenosis of left carotid artery.      She saw Chavo Gray with psychiatry on January 21, 2022 for occlusion and stenosis of bilateral carotid arteries. She saw Dr. Elmer Gupta, psychiatrist, on September 1, 2020 for surveillance of generalized anxiety disorder,  memory deficits and insomnia.  She states has not been taking Namenda for memory deficits.    She has patient discharge information with her which shows she was evaluated at urgent care on [address] on July 26, 2022 for upper respiratory infection and was treated with Z-Rolly x 5 days, Tessalon Perles 3 times daily for cough x7 days and Claritin 10 mg daily for 14 days.   She states she continues to have occasional cough although improved.  She requests refill of Tessalon Perles.      She states she follows with [facility] for [facility].      She states her daughter Izabella Sexton brought her today.    Otherwise, she denies having fever, chills, fatigue, appetite changes; shortness of breath, wheezing; chest pain, palpitations, leg swelling; abdominal pain, nausea, vomiting, diarrhea, constipation; unusual urinary symptoms; polydipsia, polyphagia, polyuria, hot or cold intolerance; acute visual changes, numbness, headache; anxiety, depression, homicidal or suicidal thoughts.          Labs:                      WBC                      7.46                01/26/2022                 HGB                      12.3                01/26/2022                 HCT                      36.5 (L)            01/26/2022                 PLT                      171                 01/26/2022                 CHOL                     177                 01/26/2022                 TRIG                     78                  01/26/2022                 HDL                      61                  01/26/2022                 ALT                      19                  01/26/2022                 AST                      29                  01/26/2022                 NA                       137                 01/26/2022                 K                        4.0                 01/26/2022                 CL                       100                 01/26/2022                 CREATININE               0.8                 01/26/2022                 BUN                      18                  01/26/2022                 CO2                      27                  01/26/2022                 TSH                      1.035               01/26/2022                 HGBA1C                   7.2 (H)             01/26/2022            LDLCALC                  100.4               01/26/2022                 Current Outpatient Medications   Medication Sig    alcohol swabs PadM Apply 1 each topically as needed.    aspirin (ECOTRIN) 81 MG EC tablet Take 81 mg by mouth daily as needed for Pain.    atorvastatin (LIPITOR) 80 MG tablet TAKE 1 TABLET (80 MG TOTAL) BY MOUTH EVERY EVENING.    carBAMazepine (TEGRETOL XR) 100 MG 12 hr tablet Take 100 mg by mouth 2 (two) times daily.    ergocalciferol (ERGOCALCIFEROL) 50,000 unit Cap Take 1 capsule (50,000 Units total) by mouth every 14 (fourteen) days.    furosemide (LASIX) 40 MG tablet Take 40 mg by mouth once daily.    GABAPENTIN ORAL Take by mouth once daily.    pantoprazole (PROTONIX) 40 MG tablet TAKE 1 TABLET EVERY DAY    sacubitriL-valsartan (ENTRESTO) 24-26 mg per tablet Take 1 tablet by mouth 2 (two) times daily.    spironolactone (ALDACTONE) 25 MG tablet Take 25 mg by mouth.    TRUE METRIX GLUCOSE TEST STRIP Strp TEST TWICE DAILY    TRUEPLUS LANCETS 28 gauge Misc USE TO TEST BLOOD SUGAR TWICE DAILY    ULTRA THIN LANCETS 30 gauge Misc USE TWICE DAILY    blood glucose control, normal (ACCU-CHEK SMARTVIEW CONTRL SOL) Soln 1 each by Misc.(Non-Drug; Combo Route) route 2 (two) times daily.    blood-glucose meter kit Use as instructed    memantine (NAMENDA) 5 MG Tab Take 5 mg by mouth 2 (two) times daily. (HAS NOT BEEN TAKING)       Review of Systems   Constitutional: Negative for activity change, appetite change, chills, fatigue and fever.        Weight 70.9 kg (156 lb 4.9 oz) at January 26, 2022 visit.   Eyes:        See history of present illness.   Respiratory: Positive for cough. Negative for shortness of breath and wheezing.         See history of present illness.   Cardiovascular: Negative for chest pain, palpitations and leg swelling.        See history of present illness.   Gastrointestinal: Negative for abdominal pain, constipation, diarrhea, nausea and vomiting.   Endocrine: Negative for cold intolerance, heat intolerance, polydipsia, polyphagia  and polyuria.        See history of present illness.   Genitourinary: Negative for difficulty urinating.   Musculoskeletal: Positive for back pain.        See history of present illness.   Neurological: Negative for headaches.        See history of present illness.   Hematological:        See history of present illness.   Psychiatric/Behavioral: Negative for dysphoric mood and suicidal ideas. The patient is not nervous/anxious.         See history of present illness. Negative for homicidal ideas.       Objective:  Physical Exam  Vitals reviewed.   Constitutional:       General: She is not in acute distress.     Appearance: Normal appearance. She is well-developed. She is not ill-appearing, toxic-appearing or diaphoretic.      Comments: Elderly and pleasant.   Neck:      Thyroid: No thyromegaly.   Cardiovascular:      Rate and Rhythm: Normal rate and regular rhythm.      Pulses:           Dorsalis pedis pulses are 1+ on the right side and 1+ on the left side.        Posterior tibial pulses are 1+ on the right side and 1+ on the left side.      Comments: Chronic decreased pedal pulses.  Pulmonary:      Effort: Pulmonary effort is normal. No respiratory distress.      Breath sounds: Normal breath sounds. No wheezing.   Chest:   Breasts:      Left: No inverted nipple, mass, nipple discharge, skin change or tenderness.       Abdominal:      General: Bowel sounds are normal. There is no distension.      Palpations: Abdomen is soft. There is no mass.      Tenderness: There is no abdominal tenderness. There is no guarding or rebound.   Musculoskeletal:         General: No swelling or tenderness. Normal range of motion.      Cervical back: Normal range of motion and neck supple. No tenderness.      Comments: She is ambulatory without problems. Non tender back with full range of motion noted.   Feet:      Right foot:      Protective Sensation: 5 sites tested. 5 sites sensed.      Skin integrity: No ulcer or skin breakdown.       Left foot:      Protective Sensation: 5 sites tested. 5 sites sensed.      Skin integrity: No ulcer or skin breakdown.   Lymphadenopathy:      Cervical: No cervical adenopathy.   Neurological:      General: No focal deficit present.      Mental Status: She is alert and oriented to person, place, and time.   Psychiatric:         Mood and Affect: Mood normal.         Behavior: Behavior normal.         Thought Content: Thought content normal.         Judgment: Judgment normal.         ASSESSMENT:  1. Hypertension associated with diabetes    2. Type 2 diabetes mellitus with peripheral artery disease    3. Combined hyperlipidemia associated with type 2 diabetes mellitus    4. Congestive heart failure, unspecified HF chronicity, unspecified heart failure type    5. Coronary artery disease involving native coronary artery of native heart without angina pectoris    6. PAD (peripheral artery disease)    7. Memory deficit    8. Tobacco use disorder    9. Cough    10. History of left breast cancer    11. Overweight (BMI 25.0-29.9)        PLAN:  Danyelle was seen today for hypertension, diabetes and follow-up.    Diagnoses and all orders for this visit:    Hypertension associated with diabetes  -     Comprehensive Metabolic Panel; Future    Type 2 diabetes mellitus with peripheral artery disease  -     Hemoglobin A1C; Future  -     Comprehensive Metabolic Panel; Future  -     Microalbumin/Creatinine Ratio, Urine    Combined hyperlipidemia associated with type 2 diabetes mellitus  -     Comprehensive Metabolic Panel; Future    Congestive heart failure, unspecified HF chronicity, unspecified heart failure type    Coronary artery disease involving native coronary artery of native heart without angina pectoris    PAD (peripheral artery disease)    Memory deficit  -     memantine (NAMENDA) 5 MG Tab; Take 1 tablet (5 mg total) by mouth 2 (two) times daily.    Tobacco use disorder    Cough  -     benzonatate (TESSALON) 100 MG capsule;  Take 1 capsule (100 mg total) by mouth 3 (three) times daily as needed for Cough.    History of left breast cancer    Overweight (BMI 25.0-29.9)       Patient advised to call for results.  Continue current medications (including restart Namenda as directed), follow low sodium, low cholesterol, low carb diet, daily walks.  Prescription refills as noted above.  Keep follow up with specialists.  Smoking cessation advised.  Flu shot this fall.  Follow up in about 5 months (around 1/26/2023) for physical.

## 2023-02-16 ENCOUNTER — OFFICE VISIT (OUTPATIENT)
Dept: FAMILY MEDICINE | Facility: CLINIC | Age: 83
End: 2023-02-16
Payer: MEDICARE

## 2023-02-16 ENCOUNTER — LAB VISIT (OUTPATIENT)
Dept: LAB | Facility: HOSPITAL | Age: 83
End: 2023-02-16
Attending: FAMILY MEDICINE
Payer: MEDICARE

## 2023-02-16 VITALS
WEIGHT: 160.5 LBS | TEMPERATURE: 98 F | SYSTOLIC BLOOD PRESSURE: 138 MMHG | RESPIRATION RATE: 18 BRPM | HEART RATE: 70 BPM | DIASTOLIC BLOOD PRESSURE: 80 MMHG | HEIGHT: 64 IN | BODY MASS INDEX: 27.4 KG/M2 | OXYGEN SATURATION: 96 %

## 2023-02-16 DIAGNOSIS — K21.9 GASTROESOPHAGEAL REFLUX DISEASE, UNSPECIFIED WHETHER ESOPHAGITIS PRESENT: ICD-10-CM

## 2023-02-16 DIAGNOSIS — I50.9 CONGESTIVE HEART FAILURE, UNSPECIFIED HF CHRONICITY, UNSPECIFIED HEART FAILURE TYPE: ICD-10-CM

## 2023-02-16 DIAGNOSIS — Z85.3 HISTORY OF LEFT BREAST CANCER: ICD-10-CM

## 2023-02-16 DIAGNOSIS — E11.51 TYPE 2 DIABETES MELLITUS WITH PERIPHERAL ARTERY DISEASE: ICD-10-CM

## 2023-02-16 DIAGNOSIS — Z00.00 ANNUAL PHYSICAL EXAM: ICD-10-CM

## 2023-02-16 DIAGNOSIS — C50.912 MALIGNANT NEOPLASM OF LEFT BREAST IN FEMALE, ESTROGEN RECEPTOR POSITIVE, UNSPECIFIED SITE OF BREAST: ICD-10-CM

## 2023-02-16 DIAGNOSIS — E85.4 AMYLOID HEART DISEASE: ICD-10-CM

## 2023-02-16 DIAGNOSIS — E66.3 OVERWEIGHT (BMI 25.0-29.9): ICD-10-CM

## 2023-02-16 DIAGNOSIS — E11.59 HYPERTENSION ASSOCIATED WITH DIABETES: ICD-10-CM

## 2023-02-16 DIAGNOSIS — R41.3 MEMORY DEFICIT: ICD-10-CM

## 2023-02-16 DIAGNOSIS — I15.2 HYPERTENSION ASSOCIATED WITH DIABETES: ICD-10-CM

## 2023-02-16 DIAGNOSIS — K52.9 CHRONIC DIARRHEA: ICD-10-CM

## 2023-02-16 DIAGNOSIS — F41.1 GAD (GENERALIZED ANXIETY DISORDER): ICD-10-CM

## 2023-02-16 DIAGNOSIS — E11.69 COMBINED HYPERLIPIDEMIA ASSOCIATED WITH TYPE 2 DIABETES MELLITUS: ICD-10-CM

## 2023-02-16 DIAGNOSIS — E78.2 COMBINED HYPERLIPIDEMIA ASSOCIATED WITH TYPE 2 DIABETES MELLITUS: ICD-10-CM

## 2023-02-16 DIAGNOSIS — I43 AMYLOID HEART DISEASE: ICD-10-CM

## 2023-02-16 DIAGNOSIS — Z78.0 POSTMENOPAUSAL: ICD-10-CM

## 2023-02-16 DIAGNOSIS — M94.9 DISORDER OF BONE AND CARTILAGE: ICD-10-CM

## 2023-02-16 DIAGNOSIS — I77.9 CAROTID ARTERY DISEASE, UNSPECIFIED LATERALITY, UNSPECIFIED TYPE: ICD-10-CM

## 2023-02-16 DIAGNOSIS — I25.10 CORONARY ARTERY DISEASE INVOLVING NATIVE CORONARY ARTERY OF NATIVE HEART WITHOUT ANGINA PECTORIS: ICD-10-CM

## 2023-02-16 DIAGNOSIS — E55.9 VITAMIN D DEFICIENCY: ICD-10-CM

## 2023-02-16 DIAGNOSIS — Z12.31 OTHER SCREENING MAMMOGRAM: ICD-10-CM

## 2023-02-16 DIAGNOSIS — M89.9 DISORDER OF BONE AND CARTILAGE: ICD-10-CM

## 2023-02-16 DIAGNOSIS — F17.200 TOBACCO USE DISORDER: ICD-10-CM

## 2023-02-16 DIAGNOSIS — I73.9 PAD (PERIPHERAL ARTERY DISEASE): ICD-10-CM

## 2023-02-16 DIAGNOSIS — Z17.0 MALIGNANT NEOPLASM OF LEFT BREAST IN FEMALE, ESTROGEN RECEPTOR POSITIVE, UNSPECIFIED SITE OF BREAST: ICD-10-CM

## 2023-02-16 PROCEDURE — 1160F PR REVIEW ALL MEDS BY PRESCRIBER/CLIN PHARMACIST DOCUMENTED: ICD-10-PCS | Mod: HCNC,CPTII,S$GLB, | Performed by: FAMILY MEDICINE

## 2023-02-16 PROCEDURE — 3288F FALL RISK ASSESSMENT DOCD: CPT | Mod: HCNC,CPTII,S$GLB, | Performed by: FAMILY MEDICINE

## 2023-02-16 PROCEDURE — 99999 PR PBB SHADOW E&M-EST. PATIENT-LVL V: CPT | Mod: PBBFAC,HCNC,, | Performed by: FAMILY MEDICINE

## 2023-02-16 PROCEDURE — 3075F PR MOST RECENT SYSTOLIC BLOOD PRESS GE 130-139MM HG: ICD-10-PCS | Mod: HCNC,CPTII,S$GLB, | Performed by: FAMILY MEDICINE

## 2023-02-16 PROCEDURE — 84443 ASSAY THYROID STIM HORMONE: CPT | Mod: HCNC | Performed by: FAMILY MEDICINE

## 2023-02-16 PROCEDURE — 1159F MED LIST DOCD IN RCRD: CPT | Mod: HCNC,CPTII,S$GLB, | Performed by: FAMILY MEDICINE

## 2023-02-16 PROCEDURE — 99397 PR PREVENTIVE VISIT,EST,65 & OVER: ICD-10-PCS | Mod: 25,HCNC,S$GLB, | Performed by: FAMILY MEDICINE

## 2023-02-16 PROCEDURE — 1126F PR PAIN SEVERITY QUANTIFIED, NO PAIN PRESENT: ICD-10-PCS | Mod: HCNC,CPTII,S$GLB, | Performed by: FAMILY MEDICINE

## 2023-02-16 PROCEDURE — 99499 RISK ADDL DX/OHS AUDIT: ICD-10-PCS | Mod: HCNC,S$GLB,, | Performed by: FAMILY MEDICINE

## 2023-02-16 PROCEDURE — 1101F PR PT FALLS ASSESS DOC 0-1 FALLS W/OUT INJ PAST YR: ICD-10-PCS | Mod: HCNC,CPTII,S$GLB, | Performed by: FAMILY MEDICINE

## 2023-02-16 PROCEDURE — 85025 COMPLETE CBC W/AUTO DIFF WBC: CPT | Mod: HCNC | Performed by: FAMILY MEDICINE

## 2023-02-16 PROCEDURE — 36415 COLL VENOUS BLD VENIPUNCTURE: CPT | Mod: HCNC,PO | Performed by: FAMILY MEDICINE

## 2023-02-16 PROCEDURE — 80053 COMPREHEN METABOLIC PANEL: CPT | Mod: HCNC | Performed by: FAMILY MEDICINE

## 2023-02-16 PROCEDURE — 3079F PR MOST RECENT DIASTOLIC BLOOD PRESSURE 80-89 MM HG: ICD-10-PCS | Mod: HCNC,CPTII,S$GLB, | Performed by: FAMILY MEDICINE

## 2023-02-16 PROCEDURE — 1159F PR MEDICATION LIST DOCUMENTED IN MEDICAL RECORD: ICD-10-PCS | Mod: HCNC,CPTII,S$GLB, | Performed by: FAMILY MEDICINE

## 2023-02-16 PROCEDURE — 99397 PER PM REEVAL EST PAT 65+ YR: CPT | Mod: 25,HCNC,S$GLB, | Performed by: FAMILY MEDICINE

## 2023-02-16 PROCEDURE — 3075F SYST BP GE 130 - 139MM HG: CPT | Mod: HCNC,CPTII,S$GLB, | Performed by: FAMILY MEDICINE

## 2023-02-16 PROCEDURE — 83036 HEMOGLOBIN GLYCOSYLATED A1C: CPT | Mod: HCNC | Performed by: FAMILY MEDICINE

## 2023-02-16 PROCEDURE — 1126F AMNT PAIN NOTED NONE PRSNT: CPT | Mod: HCNC,CPTII,S$GLB, | Performed by: FAMILY MEDICINE

## 2023-02-16 PROCEDURE — 99999 PR PBB SHADOW E&M-EST. PATIENT-LVL V: ICD-10-PCS | Mod: PBBFAC,HCNC,, | Performed by: FAMILY MEDICINE

## 2023-02-16 PROCEDURE — 99406 BEHAV CHNG SMOKING 3-10 MIN: CPT | Mod: HCNC,S$GLB,, | Performed by: FAMILY MEDICINE

## 2023-02-16 PROCEDURE — 1160F RVW MEDS BY RX/DR IN RCRD: CPT | Mod: HCNC,CPTII,S$GLB, | Performed by: FAMILY MEDICINE

## 2023-02-16 PROCEDURE — 99499 UNLISTED E&M SERVICE: CPT | Mod: HCNC,S$GLB,, | Performed by: FAMILY MEDICINE

## 2023-02-16 PROCEDURE — 80061 LIPID PANEL: CPT | Mod: HCNC | Performed by: FAMILY MEDICINE

## 2023-02-16 PROCEDURE — 99406 PR TOBACCO USE CESSATION INTERMEDIATE 3-10 MINUTES: ICD-10-PCS | Mod: HCNC,S$GLB,, | Performed by: FAMILY MEDICINE

## 2023-02-16 PROCEDURE — 3288F PR FALLS RISK ASSESSMENT DOCUMENTED: ICD-10-PCS | Mod: HCNC,CPTII,S$GLB, | Performed by: FAMILY MEDICINE

## 2023-02-16 PROCEDURE — 1101F PT FALLS ASSESS-DOCD LE1/YR: CPT | Mod: HCNC,CPTII,S$GLB, | Performed by: FAMILY MEDICINE

## 2023-02-16 PROCEDURE — 3079F DIAST BP 80-89 MM HG: CPT | Mod: HCNC,CPTII,S$GLB, | Performed by: FAMILY MEDICINE

## 2023-02-16 RX ORDER — MEMANTINE HYDROCHLORIDE 5 MG/1
5 TABLET ORAL 2 TIMES DAILY
Qty: 180 TABLET | Refills: 1 | Status: SHIPPED | OUTPATIENT
Start: 2023-02-16 | End: 2023-07-11

## 2023-02-16 RX ORDER — ATORVASTATIN CALCIUM 80 MG/1
80 TABLET, FILM COATED ORAL NIGHTLY
Qty: 90 TABLET | Refills: 1 | Status: SHIPPED | OUTPATIENT
Start: 2023-02-16 | End: 2023-08-14

## 2023-02-16 NOTE — PROGRESS NOTES
HISTORY OF PRESENT ILLNESS: Ms. Garcia comes in today non fasting and without taking medications for annual wellness exam. She reports no acute problems today.     END OF LIFE DECISION: She has a living will and does not desire life support.     She no longer follows with hematologist/oncologist Dr. Finley for breast cancer surveillance.      She saw Dr. Fredi Garces, cardiologist, on August 24, 2022 for amyloid heart disease, dilated cardiomyopathy, nonobstructive CAD, moderate carotid artery disease, left bundle branch block, acute diastolic CHF, PAD and acute systolic congestive heart failure, hypertension surveillance with follow up advised and scheduled for March 2023.                              She saw Dr. José Miguel Jeff, vascular surgeon, on December 22, 2022 for bilateral carotid artery stenosis.                      She saw Chavo Gray with psychiatry on November 28, 2022 for abnormal levels of other serum enzymes, deficiency of other                  specified B group vitamins, trigeminal  neuralgia, and other specified dorsopathies, site unspecified. She saw Dr. Elmer Gupta,                         psychiatrist, on September 1, 2020 for surveillance of generalized anxiety disorder,  memory deficits and insomnia.     She continues to occasionally smoke and states she continues to work on quitting but states she no longer uses Nicotine patches.    Current Outpatient Medications   Medication Sig    alcohol swabs PadM Apply 1 each topically as needed.    aspirin (ECOTRIN) 81 MG EC tablet Take 81 mg by mouth daily as needed for Pain.    atorvastatin (LIPITOR) 80 MG tablet TAKE 1 TABLET (80 MG TOTAL) BY MOUTH EVERY EVENING.    carBAMazepine (TEGRETOL XR) 100 MG 12 hr tablet Take 100 mg by mouth 2 (two) times daily.    ergocalciferol (ERGOCALCIFEROL) 50,000 unit Cap Take 1 capsule (50,000 Units total) by mouth every 14 (fourteen) days.    furosemide (LASIX) 40 MG tablet Take 40 mg by mouth once  daily.    GABAPENTIN ORAL Take 100 mg by mouth 3 (three) times daily.    memantine (NAMENDA) 5 MG Tab Take 1 tablet (5 mg total) by mouth 2 (two) times daily.    sacubitriL-valsartan (ENTRESTO) 24-26 mg per tablet Take 1 tablet by mouth 2 (two) times daily.    spironolactone (ALDACTONE) 25 MG tablet Take 25 mg by mouth.    TRUE METRIX GLUCOSE TEST STRIP Strp TEST TWICE DAILY    TRUEPLUS LANCETS 28 gauge Misc USE TO TEST BLOOD SUGAR TWICE DAILY    ULTRA THIN LANCETS 30 gauge Misc USE TWICE DAILY    blood glucose control, normal (ACCU-CHEK SMARTVIEW CONTRL SOL) Soln 1 each by Misc.(Non-Drug; Combo Route) route 2 (two) times daily.    blood-glucose meter kit Use as instructed      SCREENINGS:   Cholesterol: January 26, 2022.   FFS/Colonoscopy: July 7, 2020 - benign colon polyp, diverticulosis; repeat in 5 years.   Mammogram (with Dr. Finley): June 7, 2022 - benign.   GYN Exam (Breast exam) with Dr. Finley: February 10, 2021.   Dexa Scan: March 16, 2018 - osteopenia.   Eye Exam: July 19, 2017 with Dr. Cruz. She declines and states she will schedule herself.  Dental Exam: Years ago per patient. She wears top dentures only. She declines.  PPD: Negative in the past.  Immunizations: Td/Tdap - less than 10 years ago per patient.  Gardasil - N./A.  Zostavax - June 14, 2012.  Shingrix - Never. Advised insurance covered benefit on at local pharmacy.  Pneumovax - March 26, 2014.    Prevnar-13 shot - March 29, 2016.    Seasonal Flu - September 29, 2021.    Covid-19 (Moderna) vaccine series - February 3, 2021, March 3, 2021, December 16, 2021.    ROS:  GENERAL: Denies fever, chills, fatigue or unusual weight change. Appetite good. Weight 71.7 kg (158 lb 1.1 oz) at August 26, 2022 visit. Reports not exercises. Reports monitors diet.   SKIN: Denies rashes, itching, changes in mole, color or texture of skin or easy bruising.   HEAD: Denies headaches or recent head trauma.   EYES: Denies change in vision, pain, diplopia, redness  "or discharge. Wears glasses.  EARS: Denies ear pain, discharge, vertigo or decreased hearing.   NOSE: Denies loss of smell, epistaxis or rhinitis.  MOUTH & THROAT: Denies hoarseness or change in voice. Denies excessive gum bleeding or mouth sores. Denies sore throat.  NODES: Denies swollen glands.  CHEST: Denies BRITO, wheezing, cough, or sputum production.  CARDIOVASCULAR: Denies PND, chest pain, orthopnea or reduced exercise tolerance. Denies palpitations. Reports not performs home blood pressure checks. See history of present illness..  ABDOMEN: Denies constipation, nausea, vomiting, abdominal pain, or blood in stool. Reports diarrhea with every thing she eats and desires to see GI.  URINARY: Denies flank pain, dysuria or hematuria.  GENITOURINARY: Denies flank pain, dysuria, frequency or hematuria. Performs monthly breast self exams. See history of present illness.  ENDOCRINE: Denies thyroid problems. Reports occasionally performs home glucose checks with levels ranging 120's. .  HEME/LYMPH: Denies bleeding problems.  PERIPHERAL VASCULAR:Denies claudication or cyanosis.  MUSCULOSKELETAL: Denies pain, stiffness, edema.   NEUROLOGIC: Denies history of seizures, tremors, paralysis, alteration of gait or coordination. Reports has stable, chronic memory issue. See history of present illness.  PSYCHIATRIC: Denies mood swings, depression, anxiety, homicidal or suicidal thoughts. Denies sleep problems.     PE:   Blood Pressure 138/80 Comment: Rechecked by Dr. Chan.  Pulse 70   Temperature 98.4 °F (36.9 °C) (Tympanic)   Respiration 18   Height 5' 4" (1.626 m)   Weight 72.8 kg (160 lb 7.9 oz)   Oxygen Saturation 96%   Body Mass Index 27.55 kg/m²   APPEARANCE: Well nourished, well developed female, overweight, elderly and pleasant, alert and oriented in no acute distress.   HEAD: Nontender. Full range of motion.  EYES: PERRL, conjunctiva pink, lids no edema. She wears glasses.   EARS: External canal patent, no " swelling or redness. TM's shiny and clear.  NOSE: Mucosa and turbinates pink, not swollen. No discharge. Nontender sinuses.  THROAT: No pharyngeal erythema or exudate. No stridor. She has top dentures.   NECK: Supple, no mass, thyroid not enlarged.  NODES: No cervica or axillary lymph node enlargement.  CHEST: Normal respiratory effort. Lungs clear to auscultation.  CARDIOVASCULAR: Bradycardia today with normal S1, S2. No rubs, murmurs or gallops. PMI not displaced. Left carotid bruit not heard today. Feet look okay without ulcerations or skin breaks. Chronic slightly decreased pedal pulses palpable bilaterally. No edema.  ABDOMEN: Bowel sounds present. Not distended. Soft. No tenderness, masses or organomegaly.  BREAST EXAM: Bilateral breast exam unremarkable.  PELVIC EXAM: Bimanual examination not examined as patient declines and as patient has had JACQUELINE/BSO due to noncancerous reasons.   RECTAL EXAM: Not examined per patient request.   MUSCULOSKELETAL: No joint deformities or stiffness. She is ambulatory without problems.  SKIN: No rashes or suspicious lesions, normal color and turgor.  NEUROLOGIC: Cranial Nerves: II-XII grossly intact. DTR's: Knees, Ankles 2+ and equal bilaterally. Monofilament test unremarkable. Gait & Posture: Normal gait and fine motion.  PSYCHIATRIC: Patient alert, oriented x 3. Mood/Affect normal without acute anxiety depression noted. Judgment/insight good as she makes appropriate decisions during today's examination but chronic, slight decreased memory deficit noted.    Protective Sensation (w/ 10 gram monofilament):  Right: Intact  Left: Intact    Visual Inspection:  Normal -  Bilateral    Pedal Pulses:   Right: Diminished - chronic.  Left: Diminished - chronic.    Posterior tibialis:   Right:Diminished - chronic.  Left: Diminished - chronic.     ASSESSMENT:    ICD-10-CM ICD-9-CM    1. Annual physical exam  Z00.00 V70.0       2. Hypertension associated with diabetes  E11.59 250.80  Comprehensive Metabolic Panel    I15.2 401.9 Lipid Panel      CBC Auto Differential      TSH      3. Type 2 diabetes mellitus with peripheral artery disease  E11.51 250.70 Hemoglobin A1C     443.81 Microalbumin/Creatinine Ratio, Urine      4. Combined hyperlipidemia associated with type 2 diabetes mellitus  E11.69 250.80 Comprehensive Metabolic Panel    E78.2 272.2 Lipid Panel      atorvastatin (LIPITOR) 80 MG tablet      5. Congestive heart failure, unspecified HF chronicity, unspecified heart failure type  I50.9 428.0       6. Coronary artery disease involving native coronary artery of native heart without angina pectoris  I25.10 414.01       7. PAD (peripheral artery disease)  I73.9 443.9       8. Carotid artery disease, unspecified laterality, unspecified type  I77.9 447.9       9. 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 Vitamin D deficiency  E55.9 268.9       11. Disorder of bone and cartilage  M89.9 733.90 DXA Bone Density Axial Skeleton 1 or more sites    M94.9        12. History of left breast cancer  Z85.3 V10.3       13. Malignant neoplasm of left breast in female, estrogen receptor positive, unspecified site of breast  C50.912 174.9     Z17.0 V86.0       14. Chronic diarrhea  K52.9 787.91 Ambulatory referral/consult to Gastroenterology      15. Gastroesophageal reflux disease, unspecified whether esophagitis present  K21.9 530.81       16. Tobacco use disorder  F17.200 305.1       17. Memory deficit  R41.3 780.93 memantine (NAMENDA) 5 MG Tab      18. BRAYAN (generalized anxiety disorder)  F41.1 300.02       19. Overweight (BMI 25.0-29.9)  E66.3 278.02       20. Postmenopausal  Z78.0 V49.81 DXA Bone Density Axial Skeleton 1 or more sites      21. Other screening mammogram  Z12.31 V76.12 Mammo Digital Screening Bilat w/ [facility]ugio          PLAN:  1. Age-appropriate counseling-appropriate low-sodium, low-cholesterol, low carbohydrate diet and exercise daily, monthly breast self exam, annual  wellness examination.   2. Patient advised to call for results.  3. Continue current medications.  4. Prescription refills as noted above.  5. Annual eye and dental examinations; I offered to schedule patient for eye appointment but she declined.  6. Keep follow up with specialists.  7. Smoking cessation advised. 3- 5 minutes spent in counseling and discussion regarding smoking cessation, risks, etc; patient stated she plans to continue to work on quitting by using Nicotine patches.  8. Keep follow up with specialists.  9. Follow up in about 6 months (around 8/16/2023) for establish care with BP/DM follow up. (With new provider at [facility] on [address]).

## 2023-02-17 LAB
ALBUMIN SERPL BCP-MCNC: 3.6 G/DL (ref 3.5–5.2)
ALP SERPL-CCNC: 132 U/L (ref 55–135)
ALT SERPL W/O P-5'-P-CCNC: 15 U/L (ref 10–44)
ANION GAP SERPL CALC-SCNC: 12 MMOL/L (ref 8–16)
AST SERPL-CCNC: 20 U/L (ref 10–40)
BASOPHILS # BLD AUTO: 0.06 K/UL (ref 0–0.2)
BASOPHILS NFR BLD: 0.9 % (ref 0–1.9)
BILIRUB SERPL-MCNC: 0.2 MG/DL (ref 0.1–1)
BUN SERPL-MCNC: 13 MG/DL (ref 8–23)
CALCIUM SERPL-MCNC: 10.1 MG/DL (ref 8.7–10.5)
CHLORIDE SERPL-SCNC: 102 MMOL/L (ref 95–110)
CHOLEST SERPL-MCNC: 179 MG/DL (ref 120–199)
CHOLEST/HDLC SERPL: 2.9 {RATIO} (ref 2–5)
CO2 SERPL-SCNC: 26 MMOL/L (ref 23–29)
CREAT SERPL-MCNC: 0.8 MG/DL (ref 0.5–1.4)
DIFFERENTIAL METHOD: ABNORMAL
EOSINOPHIL # BLD AUTO: 0.1 K/UL (ref 0–0.5)
EOSINOPHIL NFR BLD: 1.6 % (ref 0–8)
ERYTHROCYTE [DISTWIDTH] IN BLOOD BY AUTOMATED COUNT: 14.6 % (ref 11.5–14.5)
EST. GFR  (NO RACE VARIABLE): >60 ML/MIN/1.73 M^2
ESTIMATED AVG GLUCOSE: 123 MG/DL (ref 68–131)
GLUCOSE SERPL-MCNC: 89 MG/DL (ref 70–110)
HBA1C MFR BLD: 5.9 % (ref 4–5.6)
HCT VFR BLD AUTO: 35.2 % (ref 37–48.5)
HDLC SERPL-MCNC: 62 MG/DL (ref 40–75)
HDLC SERPL: 34.6 % (ref 20–50)
HGB BLD-MCNC: 11.3 G/DL (ref 12–16)
IMM GRANULOCYTES # BLD AUTO: 0.03 K/UL (ref 0–0.04)
IMM GRANULOCYTES NFR BLD AUTO: 0.4 % (ref 0–0.5)
LDLC SERPL CALC-MCNC: 101.2 MG/DL (ref 63–159)
LYMPHOCYTES # BLD AUTO: 2.4 K/UL (ref 1–4.8)
LYMPHOCYTES NFR BLD: 34 % (ref 18–48)
MCH RBC QN AUTO: 32.6 PG (ref 27–31)
MCHC RBC AUTO-ENTMCNC: 32.1 G/DL (ref 32–36)
MCV RBC AUTO: 101 FL (ref 82–98)
MONOCYTES # BLD AUTO: 0.7 K/UL (ref 0.3–1)
MONOCYTES NFR BLD: 9.9 % (ref 4–15)
NEUTROPHILS # BLD AUTO: 3.7 K/UL (ref 1.8–7.7)
NEUTROPHILS NFR BLD: 53.2 % (ref 38–73)
NONHDLC SERPL-MCNC: 117 MG/DL
NRBC BLD-RTO: 0 /100 WBC
PLATELET # BLD AUTO: 192 K/UL (ref 150–450)
PMV BLD AUTO: 11.9 FL (ref 9.2–12.9)
POTASSIUM SERPL-SCNC: 3.6 MMOL/L (ref 3.5–5.1)
PROT SERPL-MCNC: 7.4 G/DL (ref 6–8.4)
RBC # BLD AUTO: 3.47 M/UL (ref 4–5.4)
SODIUM SERPL-SCNC: 140 MMOL/L (ref 136–145)
TRIGL SERPL-MCNC: 79 MG/DL (ref 30–150)
TSH SERPL DL<=0.005 MIU/L-ACNC: 1.22 UIU/ML (ref 0.4–4)
WBC # BLD AUTO: 7 K/UL (ref 3.9–12.7)

## 2023-05-03 DIAGNOSIS — R05.9 COUGH: ICD-10-CM

## 2023-05-03 RX ORDER — BENZONATATE 100 MG/1
100 CAPSULE ORAL 3 TIMES DAILY PRN
Qty: 30 CAPSULE | Refills: 0 | Status: SHIPPED | OUTPATIENT
Start: 2023-05-03 | End: 2023-05-13

## 2023-05-03 NOTE — TELEPHONE ENCOUNTER
----- Message from Jad Johnson sent at 5/3/2023  9:50 AM CDT -----  Contact: [phone]  Patient would like to consult with a nurse in regards to her medications. Please call to advise at [phone]. Thanks      Detail Level: Detailed Patient Specific Counseling (Will Not Stick From Patient To Patient): - recommended staying away from hand sanitizers and using vanicream bar only on the palms of the hands\\n- ok to continue to use desonide as needed for the face for flares\\n- use mometasone on tops of hands to thin out the thickened skin and make the skin pigment lighter in color as needed

## 2023-05-03 NOTE — TELEPHONE ENCOUNTER
Pt states she is having a recurrent cough and the tessalon perles help   Request rx sent to the pharmacy for her

## 2023-06-13 ENCOUNTER — TELEPHONE (OUTPATIENT)
Dept: GASTROENTEROLOGY | Facility: CLINIC | Age: 83
End: 2023-06-13
Payer: MEDICARE

## 2023-06-21 ENCOUNTER — TELEPHONE (OUTPATIENT)
Dept: ADMINISTRATIVE | Facility: HOSPITAL | Age: 83
End: 2023-06-21
Payer: MEDICARE

## 2023-07-10 DIAGNOSIS — R41.3 MEMORY DEFICIT: ICD-10-CM

## 2023-07-11 ENCOUNTER — HOSPITAL ENCOUNTER (OUTPATIENT)
Dept: RADIOLOGY | Facility: HOSPITAL | Age: 83
Discharge: HOME | End: 2023-07-11
Attending: FAMILY MEDICINE
Payer: MEDICARE

## 2023-07-11 VITALS — WEIGHT: 160.5 LBS | HEIGHT: 64 IN | BODY MASS INDEX: 27.4 KG/M2

## 2023-07-11 DIAGNOSIS — Z12.31 OTHER SCREENING MAMMOGRAM: ICD-10-CM

## 2023-07-11 PROCEDURE — 77063 BREAST TOMOSYNTHESIS BI: CPT | Mod: 26,HCNC,, | Performed by: RADIOLOGY

## 2023-07-11 PROCEDURE — 77063 MAMMO DIGITAL SCREENING BILAT WITH TOMO: ICD-10-PCS | Mod: 26,HCNC,, | Performed by: RADIOLOGY

## 2023-07-11 PROCEDURE — 77067 SCR MAMMO BI INCL CAD: CPT | Mod: TC,HCNC

## 2023-07-11 PROCEDURE — 77067 MAMMO DIGITAL SCREENING BILAT WITH TOMO: ICD-10-PCS | Mod: 26,HCNC,, | Performed by: RADIOLOGY

## 2023-07-11 PROCEDURE — 77067 SCR MAMMO BI INCL CAD: CPT | Mod: 26,HCNC,, | Performed by: RADIOLOGY

## 2023-07-11 RX ORDER — MEMANTINE HYDROCHLORIDE 5 MG/1
TABLET ORAL
Qty: 180 TABLET | Refills: 1 | Status: SHIPPED | OUTPATIENT
Start: 2023-07-11 | End: 2024-01-18

## 2023-08-14 DIAGNOSIS — E11.69 COMBINED HYPERLIPIDEMIA ASSOCIATED WITH TYPE 2 DIABETES MELLITUS: ICD-10-CM

## 2023-08-14 DIAGNOSIS — E78.2 COMBINED HYPERLIPIDEMIA ASSOCIATED WITH TYPE 2 DIABETES MELLITUS: ICD-10-CM

## 2023-08-14 RX ORDER — ATORVASTATIN CALCIUM 80 MG/1
80 TABLET, FILM COATED ORAL NIGHTLY
Qty: 90 TABLET | Refills: 1 | Status: SHIPPED | OUTPATIENT
Start: 2023-08-14

## 2023-08-14 NOTE — TELEPHONE ENCOUNTER
Refill Decision Note   Danyelle Garcia  is requesting a refill authorization.  Brief Assessment and Rationale for Refill:  Approve     Medication Therapy Plan:         Comments:     Note composed:6:30 AM 08/14/2023             Appointments     Last Visit   2/16/2023 Dilma Chan MD   Next Visit   Visit date not found Dilma Chan MD

## 2023-08-14 NOTE — TELEPHONE ENCOUNTER
No care due was identified.  [facility] Embedded Care Due Messages. Reference number: [id number].   8/14/2023 5:41:54 AM CDT

## 2023-08-29 ENCOUNTER — TELEPHONE (OUTPATIENT)
Dept: FAMILY MEDICINE | Facility: CLINIC | Age: 83
End: 2023-08-29
Payer: MEDICARE

## 2023-08-29 NOTE — TELEPHONE ENCOUNTER
----- Message from Dilma Chan MD sent at 8/29/2023  1:22 AM CDT -----  Advise pt dexa scan is okay. Continue medications. Thanks.

## 2023-08-31 ENCOUNTER — OFFICE VISIT (OUTPATIENT)
Dept: FAMILY MEDICINE | Facility: CLINIC | Age: 83
End: 2023-08-31
Payer: MEDICARE

## 2023-08-31 ENCOUNTER — HOSPITAL ENCOUNTER (OUTPATIENT)
Dept: RADIOLOGY | Facility: HOSPITAL | Age: 83
Discharge: HOME | End: 2023-08-31
Attending: NURSE PRACTITIONER
Payer: MEDICARE

## 2023-08-31 VITALS
SYSTOLIC BLOOD PRESSURE: 118 MMHG | DIASTOLIC BLOOD PRESSURE: 78 MMHG | HEART RATE: 73 BPM | BODY MASS INDEX: 25.33 KG/M2 | RESPIRATION RATE: 18 BRPM | HEIGHT: 64 IN | TEMPERATURE: 98 F | OXYGEN SATURATION: 95 % | WEIGHT: 148.38 LBS

## 2023-08-31 DIAGNOSIS — R07.0 THROAT PAIN: ICD-10-CM

## 2023-08-31 DIAGNOSIS — R05.3 CHRONIC COUGH: ICD-10-CM

## 2023-08-31 DIAGNOSIS — M25.551 PAIN OF RIGHT HIP: ICD-10-CM

## 2023-08-31 DIAGNOSIS — M25.551 PAIN OF RIGHT HIP: Primary | ICD-10-CM

## 2023-08-31 PROCEDURE — 3074F SYST BP LT 130 MM HG: CPT | Mod: HCNC,CPTII,S$GLB, | Performed by: NURSE PRACTITIONER

## 2023-08-31 PROCEDURE — 96372 PR INJECTION,THERAP/PROPH/DIAG2ST, IM OR SUBCUT: ICD-10-PCS | Mod: HCNC,S$GLB,, | Performed by: NURSE PRACTITIONER

## 2023-08-31 PROCEDURE — 73502 X-RAY EXAM HIP UNI 2-3 VIEWS: CPT | Mod: 26,HCNC,RT, | Performed by: RADIOLOGY

## 2023-08-31 PROCEDURE — 3078F PR MOST RECENT DIASTOLIC BLOOD PRESSURE < 80 MM HG: ICD-10-PCS | Mod: HCNC,CPTII,S$GLB, | Performed by: NURSE PRACTITIONER

## 2023-08-31 PROCEDURE — 73502 XR HIP WITH PELVIS WHEN PERFORMED, 2 OR 3  VIEWS RIGHT: ICD-10-PCS | Mod: 26,HCNC,RT, | Performed by: RADIOLOGY

## 2023-08-31 PROCEDURE — 3078F DIAST BP <80 MM HG: CPT | Mod: HCNC,CPTII,S$GLB, | Performed by: NURSE PRACTITIONER

## 2023-08-31 PROCEDURE — 1101F PR PT FALLS ASSESS DOC 0-1 FALLS W/OUT INJ PAST YR: ICD-10-PCS | Mod: HCNC,CPTII,S$GLB, | Performed by: NURSE PRACTITIONER

## 2023-08-31 PROCEDURE — 96372 THER/PROPH/DIAG INJ SC/IM: CPT | Mod: HCNC,S$GLB,, | Performed by: NURSE PRACTITIONER

## 2023-08-31 PROCEDURE — 99999 PR PBB SHADOW E&M-EST. PATIENT-LVL V: CPT | Mod: PBBFAC,HCNC,, | Performed by: NURSE PRACTITIONER

## 2023-08-31 PROCEDURE — 99999 PR PBB SHADOW E&M-EST. PATIENT-LVL V: ICD-10-PCS | Mod: PBBFAC,HCNC,, | Performed by: NURSE PRACTITIONER

## 2023-08-31 PROCEDURE — 1101F PT FALLS ASSESS-DOCD LE1/YR: CPT | Mod: HCNC,CPTII,S$GLB, | Performed by: NURSE PRACTITIONER

## 2023-08-31 PROCEDURE — 3074F PR MOST RECENT SYSTOLIC BLOOD PRESSURE < 130 MM HG: ICD-10-PCS | Mod: HCNC,CPTII,S$GLB, | Performed by: NURSE PRACTITIONER

## 2023-08-31 PROCEDURE — 99214 OFFICE O/P EST MOD 30 MIN: CPT | Mod: HCNC,25,S$GLB, | Performed by: NURSE PRACTITIONER

## 2023-08-31 PROCEDURE — 3288F FALL RISK ASSESSMENT DOCD: CPT | Mod: HCNC,CPTII,S$GLB, | Performed by: NURSE PRACTITIONER

## 2023-08-31 PROCEDURE — 1160F RVW MEDS BY RX/DR IN RCRD: CPT | Mod: HCNC,CPTII,S$GLB, | Performed by: NURSE PRACTITIONER

## 2023-08-31 PROCEDURE — 99214 PR OFFICE/OUTPT VISIT, EST, LEVL IV, 30-39 MIN: ICD-10-PCS | Mod: HCNC,25,S$GLB, | Performed by: NURSE PRACTITIONER

## 2023-08-31 PROCEDURE — 73502 X-RAY EXAM HIP UNI 2-3 VIEWS: CPT | Mod: TC,HCNC,FY,PO,RT

## 2023-08-31 PROCEDURE — 1125F PR PAIN SEVERITY QUANTIFIED, PAIN PRESENT: ICD-10-PCS | Mod: HCNC,CPTII,S$GLB, | Performed by: NURSE PRACTITIONER

## 2023-08-31 PROCEDURE — 1159F PR MEDICATION LIST DOCUMENTED IN MEDICAL RECORD: ICD-10-PCS | Mod: HCNC,CPTII,S$GLB, | Performed by: NURSE PRACTITIONER

## 2023-08-31 PROCEDURE — 1125F AMNT PAIN NOTED PAIN PRSNT: CPT | Mod: HCNC,CPTII,S$GLB, | Performed by: NURSE PRACTITIONER

## 2023-08-31 PROCEDURE — 3288F PR FALLS RISK ASSESSMENT DOCUMENTED: ICD-10-PCS | Mod: HCNC,CPTII,S$GLB, | Performed by: NURSE PRACTITIONER

## 2023-08-31 PROCEDURE — 1160F PR REVIEW ALL MEDS BY PRESCRIBER/CLIN PHARMACIST DOCUMENTED: ICD-10-PCS | Mod: HCNC,CPTII,S$GLB, | Performed by: NURSE PRACTITIONER

## 2023-08-31 PROCEDURE — 1159F MED LIST DOCD IN RCRD: CPT | Mod: HCNC,CPTII,S$GLB, | Performed by: NURSE PRACTITIONER

## 2023-08-31 RX ORDER — KETOROLAC TROMETHAMINE 30 MG/ML
15 INJECTION, SOLUTION INTRAMUSCULAR; INTRAVENOUS
Status: COMPLETED | OUTPATIENT
Start: 2023-08-31 | End: 2023-08-31

## 2023-08-31 RX ORDER — LEVOCETIRIZINE DIHYDROCHLORIDE 5 MG/1
5 TABLET, FILM COATED ORAL NIGHTLY
Qty: 30 TABLET | Refills: 11 | Status: SHIPPED | OUTPATIENT
Start: 2023-08-31 | End: 2024-08-30

## 2023-08-31 RX ORDER — PROMETHAZINE HYDROCHLORIDE AND DEXTROMETHORPHAN HYDROBROMIDE 6.25; 15 MG/5ML; MG/5ML
10 SYRUP ORAL EVERY 6 HOURS PRN
Qty: 240 ML | Refills: 0 | Status: SHIPPED | OUTPATIENT
Start: 2023-08-31 | End: 2023-09-10

## 2023-08-31 RX ADMIN — KETOROLAC TROMETHAMINE 15 MG: 30 INJECTION, SOLUTION INTRAMUSCULAR; INTRAVENOUS at 10:08

## 2023-08-31 NOTE — PROGRESS NOTES
Danyelle Garcia  09/05/2023  [id number]    Dilma Chan MD  Patient Care Team:  Dilma Chan MD as PCP - General (Family Medicine)  Kevin Olson LPN as Care Coordinator          Visit Type:an urgent visit for a new problem    Chief Complaint:  Chief Complaint   Patient presents with    Sore Throat     Irratation around tonsils    Cough      CONSTANT COUGH       History of Present Illness:  83-year-old female presents today with complaint of throat pain and cough x1 year. No OTC medication for symptom relief  Patient reports right leg pain x2 years that has increased and radiated up to her right hip.    History:  Past Medical History:   Diagnosis Date    Breast cancer 2006    left breast treated with lumpectomy, radiation, and chemo    CAD (coronary artery disease)     Colon polyp     Congestive heart failure 11/23/2021    Congestive heart failure     Diabetes mellitus, type 2     Diverticular disease     Dizziness     External hemorrhoid     H. pylori infection     Headache     Hyperlipidemia     Hypertension     Insomnia     Osteopenia     Postmenopausal     Tobacco use     Vitamin D deficiency disease      Past Surgical History:   Procedure Laterality Date    BREAST LUMPECTOMY Left     CATARACT EXTRACTION      CHOLECYSTECTOMY      COLONOSCOPY      COLONOSCOPY N/A 7/7/2020    Procedure: COLONOSCOPY rapid covid-19;  Surgeon: Sean Paz MD;  Location: [facility];  Service: Endoscopy;  Laterality: N/A;    EYE SURGERY Right     cataract    HYSTERECTOMY      left lumpectomy      TOTAL ABDOMINAL HYSTERECTOMY W/ BILATERAL SALPINGOOPHORECTOMY      Due to benign reasons per patient    UPPER GASTROINTESTINAL ENDOSCOPY       Family History   Problem Relation Age of Onset    Hypertension Mother     Heart attack Father     Heart disease Father         MI/CAD    Diabetes Daughter     Diabetes Son     No Known Problems Daughter     No Known Problems Son     Cancer  Neg Hx     Stroke Neg Hx      Social History     Socioeconomic History    Marital status:     Number of children: 4   Occupational History    Occupation: Retired   Tobacco Use    Smoking status: Light Smoker     Current packs/day: 0.40     Average packs/day: 0.4 packs/day for 40.0 years (16.0 ttl pk-yrs)     Types: Cigarettes    Smokeless tobacco: Never    Tobacco comments:     some times tries to quit   Substance and Sexual Activity    Alcohol use: Yes     Alcohol/week: 6.0 standard drinks of alcohol     Types: 6 Cans of beer per week     Comment: Occasionally    Drug use: No    Sexual activity: Not Currently     Birth control/protection: Post-menopausal   Social History Narrative    She wears seatbelt. She lives alone and continues to drive.     Social Determinants of Health     Physical Activity: Inactive (2/16/2023)    Exercise Vital Sign     Days of Exercise per Week: 0 days     Minutes of Exercise per Session: 0 min   Stress: No Stress Concern Present (6/24/2020)    [employer] - Occupational Stress Questionnaire     Feeling of Stress : Not at all   Social Connections: Somewhat Isolated (5/7/2019)    Social Connection and Isolation Panel [NHANES]     Frequency of Communication with Friends and Family: Three times a week     Frequency of Social Gatherings with Friends and Family: Three times a week     Attends Taoist Services: More than 4 times per year     Active Member of Clubs or Organizations: No     Attends Club or Organization Meetings: Never     Marital Status:      Patient Active Problem List   Diagnosis    Type II or unspecified type diabetes mellitus without mention of complication, not stated as uncontrolled    Tobacco use disorder    Disorder of bone and cartilage    Asymptomatic postmenopausal status    CAD (coronary artery disease)    Vitamin D deficiency    PAD (peripheral artery disease)    Right shoulder pain     Hypertension associated with diabetes    Combined hyperlipidemia associated with type 2 diabetes mellitus    Epigastric pain    History of left breast cancer    Gastroesophageal reflux disease    Diabetes mellitus with neurological manifestations, controlled    Type 2 diabetes mellitus with peripheral artery disease    Carotid artery disease    Malignant neoplasm of left female breast    Memory deficit    Type 2 diabetes mellitus without retinopathy    Serum calcium elevated    Right thigh pain    Benign colon polyp    Benign paroxysmal positional vertigo of left ear    Congestive heart failure    Amyloid heart disease    Overweight (BMI 25.0-29.9)    BRAYAN (generalized anxiety disorder)     Review of patient's allergies indicates:  No Known Allergies    The following were reviewed at this visit: active problem list, medication list, allergies, family history, social history, and health maintenance.    Medications:  Current Outpatient Medications on File Prior to Visit   Medication Sig Dispense Refill    alcohol swabs PadM Apply 1 each topically as needed. 100 each 11    aspirin (ECOTRIN) 81 MG EC tablet Take 81 mg by mouth daily as needed for Pain.      atorvastatin (LIPITOR) 80 MG tablet TAKE 1 TABLET(80 MG) BY MOUTH EVERY EVENING 90 tablet 1    carBAMazepine (TEGRETOL XR) 100 MG 12 hr tablet Take 100 mg by mouth 2 (two) times daily.      ergocalciferol (ERGOCALCIFEROL) 50,000 unit Cap Take 1 capsule (50,000 Units total) by mouth every 14 (fourteen) days. 6 capsule 1    furosemide (LASIX) 40 MG tablet Take 40 mg by mouth once daily.      GABAPENTIN ORAL Take 100 mg by mouth 3 (three) times daily.      memantine (NAMENDA) 5 MG Tab TAKE 1 TABLET(5 MG) BY MOUTH TWICE DAILY 180 tablet 1    sacubitriL-valsartan (ENTRESTO) 24-26 mg per tablet Take 1 tablet by mouth 2 (two) times daily.      TRUE METRIX GLUCOSE TEST STRIP Strp TEST TWICE DAILY 200 strip 3    TRUEPLUS LANCETS 28 gauge Misc USE  TO TEST BLOOD SUGAR TWICE DAILY 100 each 0    ULTRA THIN LANCETS 30 gauge Misc USE TWICE DAILY 200 each 3    blood glucose control, normal (ACCU-CHEK SMARTVIEW CONTRL SOL) Soln 1 each by Misc.(Non-Drug; Combo Route) route 2 (two) times daily. 1 each 0    blood-glucose meter kit Use as instructed 1 each 0    spironolactone (ALDACTONE) 25 MG tablet Take 25 mg by mouth.       No current facility-administered medications on file prior to visit.       Medications have been reviewed and reconciled with patient at this visit.  Barriers to medications reviewed with patient.    Adverse reactions to current medications reviewed with patient..    Over the counter medications reviewed and reconciled with patient.    Exam:  Wt Readings from Last 3 Encounters:   08/31/23 67.3 kg (148 lb 5.9 oz)   07/11/23 72.8 kg (160 lb 7.9 oz)   02/16/23 72.8 kg (160 lb 7.9 oz)     Temp Readings from Last 3 Encounters:   08/31/23 98.3 °F (36.8 °C) (Tympanic)   02/16/23 98.4 °F (36.9 °C) (Tympanic)   01/26/22 96.8 °F (36 °C)     BP Readings from Last 3 Encounters:   08/31/23 118/78   02/16/23 138/80   08/26/22 130/63     Pulse Readings from Last 3 Encounters:   08/31/23 73   02/16/23 70   08/26/22 68     Body mass index is 25.47 kg/m².      Review of Systems   Constitutional:  Negative for fever.   HENT:  Positive for congestion, sinus pain and sore throat.    Respiratory:  Positive for cough. Negative for shortness of breath and wheezing.    Cardiovascular:  Negative for chest pain and palpitations.   Gastrointestinal:  Negative for nausea.   Musculoskeletal:  Positive for joint pain.   Neurological:  Negative for speech change, weakness and headaches.   All other systems reviewed and are negative.    Physical Exam  Vitals and nursing note reviewed.   Constitutional:       Appearance: Normal appearance. She is obese.   HENT:      Head: Normocephalic and atraumatic.      Right Ear: Tympanic membrane, ear canal and external ear normal.       Left Ear: Tympanic membrane, ear canal and external ear normal.      Nose: Nose normal.      Mouth/Throat:      Mouth: Mucous membranes are moist.      Pharynx: Oropharynx is clear.   Eyes:      Extraocular Movements: Extraocular movements intact.      Conjunctiva/sclera: Conjunctivae normal.      Pupils: Pupils are equal, round, and reactive to light.   Cardiovascular:      Rate and Rhythm: Normal rate and regular rhythm.      Pulses: Normal pulses.      Heart sounds: Normal heart sounds.   Pulmonary:      Effort: Pulmonary effort is normal.      Breath sounds: Normal breath sounds.   Abdominal:      General: Bowel sounds are normal.      Palpations: Abdomen is soft.   Musculoskeletal:         General: Normal range of motion.      Cervical back: Normal range of motion and neck supple.        Legs:    Skin:     General: Skin is warm and dry.      Capillary Refill: Capillary refill takes less than 2 seconds.   Neurological:      General: No focal deficit present.      Mental Status: She is alert and oriented to person, place, and time.   Psychiatric:         Mood and Affect: Mood normal.         Behavior: Behavior normal.         Thought Content: Thought content normal.         Judgment: Judgment normal.       Laboratory Reviewed ({Yes)  Lab Results   Component Value Date    WBC 7.00 02/16/2023    HGB 11.3 (L) 02/16/2023    HCT 35.2 (L) 02/16/2023     02/16/2023    CHOL 179 02/16/2023    TRIG 79 02/16/2023    HDL 62 02/16/2023    ALT 15 02/16/2023    AST 20 02/16/2023     02/16/2023    K 3.6 02/16/2023     02/16/2023    CREATININE 0.8 02/16/2023    BUN 13 02/16/2023    CO2 26 02/16/2023    TSH 1.218 02/16/2023    HGBA1C 5.9 (H) 02/16/2023       Danyelle was seen today for sore throat and cough.    Diagnoses and all orders for this visit:    Pain of right hip  -     ketorolac injection 15 mg  -     X-Ray Hip 2 or 3 views Right (with Pelvis when performed); Future    Throat pain  -     levocetirizine  (XYZAL) 5 MG tablet; Take 1 tablet (5 mg total) by mouth every evening.    Chronic cough  -     promethazine-dextromethorphan (PROMETHAZINE-DM) 6.25-15 mg/5 mL Syrp; Take 10 mLs by mouth every 6 (six) hours as needed (cough).  -     Ambulatory referral/consult to Pulmonology; Future        FU with pulmonary  Xray of right hip  Start xyzal and promethazine       Care Plan/Goals: Reviewed    Goals    None     Danyelle was seen today for sore throat and cough.    Diagnoses and all orders for this visit:    Pain of right hip  -     ketorolac injection 15 mg  -     X-Ray Hip 2 or 3 views Right (with Pelvis when performed); Future    Throat pain  -     levocetirizine (XYZAL) 5 MG tablet; Take 1 tablet (5 mg total) by mouth every evening.    Chronic cough  -     promethazine-dextromethorphan (PROMETHAZINE-DM) 6.25-15 mg/5 mL Syrp; Take 10 mLs by mouth every 6 (six) hours as needed (cough).  -     Ambulatory referral/consult to Pulmonology; Future         Follow up: Follow up for with pulmonary for evaluation and treatment.    After visit summary was printed and given to patient upon discharge today.  Patient goals and care plan are included in After Visit Summary.

## 2023-09-01 ENCOUNTER — TELEPHONE (OUTPATIENT)
Dept: FAMILY MEDICINE | Facility: CLINIC | Age: 83
End: 2023-09-01
Payer: MEDICARE

## 2023-09-01 NOTE — TELEPHONE ENCOUNTER
----- Message from Karina Pérez NP sent at 9/1/2023  9:00 AM CDT -----  Please inform pt of her results   1. osteoarthritis of the right hip  2. No evidence of acute fracture

## 2023-09-01 NOTE — PROGRESS NOTES
Please inform pt of her results   1. osteoarthritis of the right hip  2. No evidence of acute fracture

## 2023-09-19 ENCOUNTER — OFFICE VISIT (OUTPATIENT)
Dept: GASTROENTEROLOGY | Facility: CLINIC | Age: 83
End: 2023-09-19
Attending: FAMILY MEDICINE
Payer: MEDICARE

## 2023-09-19 VITALS
OXYGEN SATURATION: 94 % | HEART RATE: 54 BPM | HEIGHT: 64 IN | SYSTOLIC BLOOD PRESSURE: 141 MMHG | DIASTOLIC BLOOD PRESSURE: 77 MMHG | WEIGHT: 149.25 LBS | BODY MASS INDEX: 25.48 KG/M2

## 2023-09-19 DIAGNOSIS — R10.9 ABDOMINAL CRAMPING: ICD-10-CM

## 2023-09-19 DIAGNOSIS — R19.7 DIARRHEA, UNSPECIFIED TYPE: ICD-10-CM

## 2023-09-19 DIAGNOSIS — K59.00 CONSTIPATION, UNSPECIFIED CONSTIPATION TYPE: Primary | ICD-10-CM

## 2023-09-19 PROCEDURE — 99214 OFFICE O/P EST MOD 30 MIN: CPT | Mod: HCNC,S$GLB,, | Performed by: NURSE PRACTITIONER

## 2023-09-19 PROCEDURE — 1160F PR REVIEW ALL MEDS BY PRESCRIBER/CLIN PHARMACIST DOCUMENTED: ICD-10-PCS | Mod: HCNC,CPTII,S$GLB, | Performed by: NURSE PRACTITIONER

## 2023-09-19 PROCEDURE — 3077F SYST BP >= 140 MM HG: CPT | Mod: HCNC,CPTII,S$GLB, | Performed by: NURSE PRACTITIONER

## 2023-09-19 PROCEDURE — 3077F PR MOST RECENT SYSTOLIC BLOOD PRESSURE >= 140 MM HG: ICD-10-PCS | Mod: HCNC,CPTII,S$GLB, | Performed by: NURSE PRACTITIONER

## 2023-09-19 PROCEDURE — 1159F MED LIST DOCD IN RCRD: CPT | Mod: HCNC,CPTII,S$GLB, | Performed by: NURSE PRACTITIONER

## 2023-09-19 PROCEDURE — 1159F PR MEDICATION LIST DOCUMENTED IN MEDICAL RECORD: ICD-10-PCS | Mod: HCNC,CPTII,S$GLB, | Performed by: NURSE PRACTITIONER

## 2023-09-19 PROCEDURE — 99999 PR PBB SHADOW E&M-EST. PATIENT-LVL V: CPT | Mod: PBBFAC,HCNC,, | Performed by: NURSE PRACTITIONER

## 2023-09-19 PROCEDURE — 99214 PR OFFICE/OUTPT VISIT, EST, LEVL IV, 30-39 MIN: ICD-10-PCS | Mod: HCNC,S$GLB,, | Performed by: NURSE PRACTITIONER

## 2023-09-19 PROCEDURE — 1160F RVW MEDS BY RX/DR IN RCRD: CPT | Mod: HCNC,CPTII,S$GLB, | Performed by: NURSE PRACTITIONER

## 2023-09-19 PROCEDURE — 3078F DIAST BP <80 MM HG: CPT | Mod: HCNC,CPTII,S$GLB, | Performed by: NURSE PRACTITIONER

## 2023-09-19 PROCEDURE — 99999 PR PBB SHADOW E&M-EST. PATIENT-LVL V: ICD-10-PCS | Mod: PBBFAC,HCNC,, | Performed by: NURSE PRACTITIONER

## 2023-09-19 PROCEDURE — 3078F PR MOST RECENT DIASTOLIC BLOOD PRESSURE < 80 MM HG: ICD-10-PCS | Mod: HCNC,CPTII,S$GLB, | Performed by: NURSE PRACTITIONER

## 2023-09-19 RX ORDER — POLYETHYLENE GLYCOL 3350 17 G/17G
17 POWDER, FOR SOLUTION ORAL DAILY
Qty: 510 G | Refills: 11 | Status: SHIPPED | OUTPATIENT
Start: 2023-09-19 | End: 2024-01-18

## 2023-09-20 ENCOUNTER — TELEPHONE (OUTPATIENT)
Dept: FAMILY MEDICINE | Facility: CLINIC | Age: 83
End: 2023-09-20
Payer: MEDICARE

## 2023-09-20 NOTE — TELEPHONE ENCOUNTER
----- Message from Kely Castaneda sent at 9/20/2023 12:58 PM CDT -----  .Type:  Patient Returning Call    Who Called:.Danyelle Garcia   Who Left Message for Patient:  Does the patient know what this is regarding?:  Would the patient rather a call back or a response via [facility]? Call back  Best Call Back Number .[phone]     Additional Information:

## 2023-09-20 NOTE — TELEPHONE ENCOUNTER
----- Message from Maliha Toscano sent at 9/20/2023  9:35 AM CDT -----  Patient is requesting a call back from the nurse at [phone]

## 2023-10-03 ENCOUNTER — OFFICE VISIT (OUTPATIENT)
Dept: HOME HEALTH SERVICES | Facility: CLINIC | Age: 83
End: 2023-10-03
Payer: MEDICARE

## 2023-10-03 VITALS
TEMPERATURE: 98 F | DIASTOLIC BLOOD PRESSURE: 55 MMHG | OXYGEN SATURATION: 98 % | SYSTOLIC BLOOD PRESSURE: 113 MMHG | HEIGHT: 64 IN | BODY MASS INDEX: 25.78 KG/M2 | HEART RATE: 61 BPM | WEIGHT: 151 LBS

## 2023-10-03 DIAGNOSIS — E08.40 DIABETES MELLITUS DUE TO UNDERLYING CONDITION, CONTROLLED, WITH DIABETIC NEUROPATHY, UNSPECIFIED WHETHER LONG TERM INSULIN USE: ICD-10-CM

## 2023-10-03 DIAGNOSIS — F17.200 TOBACCO USE DISORDER: ICD-10-CM

## 2023-10-03 DIAGNOSIS — I73.9 PAD (PERIPHERAL ARTERY DISEASE): ICD-10-CM

## 2023-10-03 DIAGNOSIS — E55.9 VITAMIN D DEFICIENCY: ICD-10-CM

## 2023-10-03 DIAGNOSIS — R26.9 ABNORMALITY OF GAIT AND MOBILITY: ICD-10-CM

## 2023-10-03 DIAGNOSIS — K21.9 GASTROESOPHAGEAL REFLUX DISEASE, UNSPECIFIED WHETHER ESOPHAGITIS PRESENT: ICD-10-CM

## 2023-10-03 DIAGNOSIS — Z85.3 HISTORY OF LEFT BREAST CANCER: ICD-10-CM

## 2023-10-03 DIAGNOSIS — I50.9 CONGESTIVE HEART FAILURE, UNSPECIFIED HF CHRONICITY, UNSPECIFIED HEART FAILURE TYPE: ICD-10-CM

## 2023-10-03 DIAGNOSIS — E11.51 TYPE 2 DIABETES MELLITUS WITH PERIPHERAL ARTERY DISEASE: ICD-10-CM

## 2023-10-03 DIAGNOSIS — I77.9 CAROTID ARTERY DISEASE, UNSPECIFIED LATERALITY, UNSPECIFIED TYPE: ICD-10-CM

## 2023-10-03 DIAGNOSIS — M79.651 RIGHT THIGH PAIN: ICD-10-CM

## 2023-10-03 DIAGNOSIS — E11.59 HYPERTENSION ASSOCIATED WITH DIABETES: ICD-10-CM

## 2023-10-03 DIAGNOSIS — R41.3 MEMORY DEFICIT: ICD-10-CM

## 2023-10-03 DIAGNOSIS — F41.1 GAD (GENERALIZED ANXIETY DISORDER): ICD-10-CM

## 2023-10-03 DIAGNOSIS — E85.4 AMYLOID HEART DISEASE: ICD-10-CM

## 2023-10-03 DIAGNOSIS — I25.10 CORONARY ARTERY DISEASE INVOLVING NATIVE CORONARY ARTERY OF NATIVE HEART WITHOUT ANGINA PECTORIS: Chronic | ICD-10-CM

## 2023-10-03 DIAGNOSIS — Z00.00 ENCOUNTER FOR PREVENTIVE HEALTH EXAMINATION: Primary | ICD-10-CM

## 2023-10-03 DIAGNOSIS — I15.2 HYPERTENSION ASSOCIATED WITH DIABETES: ICD-10-CM

## 2023-10-03 DIAGNOSIS — E11.69 COMBINED HYPERLIPIDEMIA ASSOCIATED WITH TYPE 2 DIABETES MELLITUS: ICD-10-CM

## 2023-10-03 DIAGNOSIS — E11.9 TYPE 2 DIABETES MELLITUS WITHOUT RETINOPATHY: ICD-10-CM

## 2023-10-03 DIAGNOSIS — E78.2 COMBINED HYPERLIPIDEMIA ASSOCIATED WITH TYPE 2 DIABETES MELLITUS: ICD-10-CM

## 2023-10-03 DIAGNOSIS — I43 AMYLOID HEART DISEASE: ICD-10-CM

## 2023-10-03 PROBLEM — H81.12 BENIGN PAROXYSMAL POSITIONAL VERTIGO OF LEFT EAR: Status: RESOLVED | Noted: 2019-11-05 | Resolved: 2023-10-03

## 2023-10-03 PROBLEM — C50.912 MALIGNANT NEOPLASM OF LEFT FEMALE BREAST: Status: RESOLVED | Noted: 2017-04-05 | Resolved: 2023-10-03

## 2023-10-03 PROCEDURE — 3074F SYST BP LT 130 MM HG: CPT | Mod: CPTII,S$GLB,, | Performed by: NURSE PRACTITIONER

## 2023-10-03 PROCEDURE — 3078F DIAST BP <80 MM HG: CPT | Mod: CPTII,S$GLB,, | Performed by: NURSE PRACTITIONER

## 2023-10-03 PROCEDURE — G9919 SCRN ND POS ND PROV OF REC: HCPCS | Mod: CPTII,S$GLB,, | Performed by: NURSE PRACTITIONER

## 2023-10-03 PROCEDURE — 1160F RVW MEDS BY RX/DR IN RCRD: CPT | Mod: CPTII,S$GLB,, | Performed by: NURSE PRACTITIONER

## 2023-10-03 PROCEDURE — 1170F PR FUNCTIONAL STATUS ASSESSED: ICD-10-PCS | Mod: CPTII,S$GLB,, | Performed by: NURSE PRACTITIONER

## 2023-10-03 PROCEDURE — 3074F PR MOST RECENT SYSTOLIC BLOOD PRESSURE < 130 MM HG: ICD-10-PCS | Mod: CPTII,S$GLB,, | Performed by: NURSE PRACTITIONER

## 2023-10-03 PROCEDURE — 1125F AMNT PAIN NOTED PAIN PRSNT: CPT | Mod: CPTII,S$GLB,, | Performed by: NURSE PRACTITIONER

## 2023-10-03 PROCEDURE — 3288F FALL RISK ASSESSMENT DOCD: CPT | Mod: CPTII,S$GLB,, | Performed by: NURSE PRACTITIONER

## 2023-10-03 PROCEDURE — 3288F PR FALLS RISK ASSESSMENT DOCUMENTED: ICD-10-PCS | Mod: CPTII,S$GLB,, | Performed by: NURSE PRACTITIONER

## 2023-10-03 PROCEDURE — 1101F PR PT FALLS ASSESS DOC 0-1 FALLS W/OUT INJ PAST YR: ICD-10-PCS | Mod: CPTII,S$GLB,, | Performed by: NURSE PRACTITIONER

## 2023-10-03 PROCEDURE — 1101F PT FALLS ASSESS-DOCD LE1/YR: CPT | Mod: CPTII,S$GLB,, | Performed by: NURSE PRACTITIONER

## 2023-10-03 PROCEDURE — 1159F PR MEDICATION LIST DOCUMENTED IN MEDICAL RECORD: ICD-10-PCS | Mod: CPTII,S$GLB,, | Performed by: NURSE PRACTITIONER

## 2023-10-03 PROCEDURE — 1125F PR PAIN SEVERITY QUANTIFIED, PAIN PRESENT: ICD-10-PCS | Mod: CPTII,S$GLB,, | Performed by: NURSE PRACTITIONER

## 2023-10-03 PROCEDURE — 1170F FXNL STATUS ASSESSED: CPT | Mod: CPTII,S$GLB,, | Performed by: NURSE PRACTITIONER

## 2023-10-03 PROCEDURE — 3078F PR MOST RECENT DIASTOLIC BLOOD PRESSURE < 80 MM HG: ICD-10-PCS | Mod: CPTII,S$GLB,, | Performed by: NURSE PRACTITIONER

## 2023-10-03 PROCEDURE — G0439 PR MEDICARE ANNUAL WELLNESS SUBSEQUENT VISIT: ICD-10-PCS | Mod: S$GLB,,, | Performed by: NURSE PRACTITIONER

## 2023-10-03 PROCEDURE — 1159F MED LIST DOCD IN RCRD: CPT | Mod: CPTII,S$GLB,, | Performed by: NURSE PRACTITIONER

## 2023-10-03 PROCEDURE — 1160F PR REVIEW ALL MEDS BY PRESCRIBER/CLIN PHARMACIST DOCUMENTED: ICD-10-PCS | Mod: CPTII,S$GLB,, | Performed by: NURSE PRACTITIONER

## 2023-10-03 PROCEDURE — G0439 PPPS, SUBSEQ VISIT: HCPCS | Mod: S$GLB,,, | Performed by: NURSE PRACTITIONER

## 2023-10-03 PROCEDURE — G9919 PR SCREENING AND POSITIVE: ICD-10-PCS | Mod: CPTII,S$GLB,, | Performed by: NURSE PRACTITIONER

## 2023-10-03 RX ORDER — PROMETHAZINE HYDROCHLORIDE AND DEXTROMETHORPHAN HYDROBROMIDE 6.25; 15 MG/5ML; MG/5ML
10 SYRUP ORAL EVERY 6 HOURS PRN
COMMUNITY
End: 2024-01-18

## 2023-10-03 RX ORDER — CHOLECALCIFEROL (VITAMIN D3) 125 MCG
2 CAPSULE ORAL DAILY PRN
COMMUNITY

## 2023-10-03 NOTE — PATIENT INSTRUCTIONS
Counseling and Referral of Other Preventative  (Italic type indicates deductible and co-insurance are waived)    Patient Name: Danyelle Garcia  Today's Date: 10/3/2023    Health Maintenance       Date Due Completion Date    Shingles Vaccine (1 of 2) 08/09/2012 6/14/2012    Diabetes Urine Screening 03/25/2016 3/25/2015    Eye Exam 03/12/2022 3/12/2021    Override on 7/19/2017: Done    Override on 4/6/2016: Done    Override on 7/21/2014: Done    Override on 7/7/2011: Done    COVID-19 Vaccine (5 - Moderna series) 02/28/2023 10/31/2022    Hemoglobin A1c 08/16/2023 2/16/2023    Override on 3/25/2015: Done    Influenza Vaccine (1) 09/01/2023 10/31/2022    Lipid Panel 02/16/2024 2/16/2023    Override on 3/25/2015: Done    Aspirin/Antiplatelet Therapy 09/19/2024 9/19/2023    Colonoscopy 07/07/2025 7/7/2020    DEXA Scan 07/11/2028 7/11/2023    Override on 2/26/2008: Done (osteopenia repeat in 1-2 years)        Orders Placed This Encounter   Procedures    Hemoglobin A1C    MICROALBUMIN / CREATININE RATIO URINE     The following information is provided to all patients.  This information is to help you find resources for any of the problems found today that may be affecting your health:                Living healthy guide: www.[facility].[place].gov      Understanding Diabetes: www.diabetes.org      Eating healthy: www.cdc.gov/healthyweight      CDC home safety checklist: www.cdc.gov/steadi/patient.html      Agency on Aging: www.goea.[place].[facility]      Alcoholics anonymous (AA): www.aa.org      Physical Activity: www.aquiles.nih.gov/tk3vcki      Tobacco use: www.quitwithusla.org

## 2023-10-03 NOTE — PROGRESS NOTES
"Danyelle Garcia presented for Medicare AWV today. The following components were reviewed and updated:    Medical history  Family History  Social history  Allergies and Current Medications  Health Risk Assessment  Health Maintenance  Care Team    **See Completed Assessments for Annual Wellness visit with in the encounter summary    The following assessments were completed:  Depression Screening  Cognitive function Screening      Timed Get Up Test  Whisper Test    Vitals:    10/03/23 1321   BP: (!) 113/55   Pulse: 61   Temp: 97.7 °F (36.5 °C)   TempSrc: Temporal   SpO2: 98%   Weight: 68.5 kg (151 lb)   Height: 5' 4" (1.626 m)     Body mass index is 25.92 kg/m².   ]    Physical Exam  Vitals reviewed.   Constitutional:       Appearance: Normal appearance.   HENT:      Head: Normocephalic and atraumatic.   Cardiovascular:      Rate and Rhythm: Normal rate and regular rhythm.      Pulses: Normal pulses.      Heart sounds: Normal heart sounds.   Pulmonary:      Effort: Pulmonary effort is normal.      Breath sounds: Normal breath sounds.   Musculoskeletal:         General: Normal range of motion.      Cervical back: Normal range of motion and neck supple.   Skin:     General: Skin is warm and dry.   Neurological:      Mental Status: She is alert and oriented to person, place, and time.      Gait: Gait abnormal.   Psychiatric:         Behavior: Behavior normal.      Comments: irritable          Outpatient Medications Marked as Taking for the 10/3/23 encounter (Office Visit) with Brianna Obregon FNP   Medication Sig Dispense Refill    alcohol swabs PadM Apply 1 each topically as needed. 100 each 11    aspirin (ECOTRIN) 81 MG EC tablet Take 81 mg by mouth daily as needed for Pain.      atorvastatin (LIPITOR) 80 MG tablet TAKE 1 TABLET(80 MG) BY MOUTH EVERY EVENING 90 tablet 1    empagliflozin (JARDIANCE) 10 mg tablet Take 10 mg by mouth once daily.      ergocalciferol (ERGOCALCIFEROL) 50,000 unit Cap Take 1 capsule (50,000 " Units total) by mouth every 14 (fourteen) days. 6 capsule 1    furosemide (LASIX) 40 MG tablet Take 40 mg by mouth once daily.      GABAPENTIN ORAL Take 100 mg by mouth 3 (three) times daily.      memantine (NAMENDA) 5 MG Tab TAKE 1 TABLET(5 MG) BY MOUTH TWICE DAILY 180 tablet 1    naproxen sodium (ALEVE) 220 mg Cap Take 2 capsules by mouth daily as needed (headache).      polyethylene glycol (GLYCOLAX) 17 gram/dose powder Take 17 g by mouth once daily. 510 g 11    promethazine-dextromethorphan (PROMETHAZINE-DM) 6.25-15 mg/5 mL Syrp Take 10 mLs by mouth every 6 (six) hours as needed (cough).      sacubitriL-valsartan (ENTRESTO) 24-26 mg per tablet Take 1 tablet by mouth 2 (two) times daily.      spironolactone (ALDACTONE) 25 MG tablet Take 25 mg by mouth.          Diagnoses and health risks identified today and associated recommendations/orders:  1. Encounter for preventive health examination  Medicare awv complete. Health maintenance:  shingles vaccine, covid 19 updated vaccine, and flu vaccine due-encouraged pt to obtain at a pharmacy. Eye exam due-pt to call to schedule an appointment at [facility]. Hgba1c and diabetes urine screening due and ordered -message sent to ma to call pt to schedule.     2. Congestive heart failure, unspecified HF chronicity, unspecified heart failure type  Chronic and stable. Continue current management. See med list above. Follow up with cardiology.      3. Type 2 diabetes mellitus with peripheral artery disease   Latest Reference Range & Units 01/20/06 08:49 04/27/06 08:36 11/16/06 10:05 02/08/07 16:21 10/23/07 09:40 01/28/08 07:55 12/04/08 08:54 06/26/09 08:24 12/17/09 11:26 03/09/10 10:34 06/28/10 11:00 09/22/10 10:10 12/08/10 10:21 03/10/11 10:03 10/06/11 09:28 02/06/12 10:24 08/24/12 10:01 04/16/13 11:59 08/16/13 08:48 03/26/14 10:24 07/24/14 08:39 03/25/15 10:20 10/29/15 11:12 04/06/16 08:19 07/20/16 11:39 11/21/16 10:36 03/29/17 11:54 09/27/17 12:35 03/16/18 09:26  08/13/18 11:33 12/28/18 09:39 05/07/19 09:59 11/11/19 10:18 06/24/20 11:45 01/28/21 09:45 09/29/21 11:39 01/26/22 10:00 08/26/22 14:13 02/16/23 12:22   Hemoglobin A1C External 4.0 - 5.6 % 6.9 (H) 6.8 (H) 6.2 5.9 5.8 6.0 5.8 5.7 6.0 5.5 5.6 5.9 5.8 5.7 6.0 5.7 5.9 5.8 5.8 6.0 5.7 6.1 5.4 5.6 5.8 5.8 6.0 5.7 (H) 5.8 (H) 5.7 (H) 5.6 5.6 5.7 (H) 5.5 5.6 5.7 (H) 7.2 (H) 6.2 (H) 5.9 (H)   Estimated Avg Glucose 68 - 131 mg/dL         126 111 114 123 120 117 126 117 123 120 120 126 117 128 108 114 120 120 126 117 120 117 114 114 117 111 114 117 160 (H) 131 123   (H): Data is abnormally high  Controlled. Continue current management. See med list. Patient states she does not check her blood sugar.  Reviewed diabetic diet, diabetic foot care, preferred BS, and HgbA1C levels. Follow up with your PCP as instructed, podiatry and ophthalmology yearly.    - Hemoglobin A1C; Future  - MICROALBUMIN / CREATININE RATIO URINE; Future    4. Type 2 diabetes mellitus without retinopathy  Controlled. Continue current management. See med list. Patient states she does not check her blood sugar.  Reviewed diabetic diet, diabetic foot care, preferred BS, and HgbA1C levels. Follow up with your PCP as instructed, podiatry and ophthalmology yearly.    - Hemoglobin A1C; Future    5. Diabetes mellitus due to underlying condition, controlled, with diabetic neuropathy, unspecified whether long term insulin use  Controlled. Continue current management. See med list. Patient states she does not check her blood sugar.  Reviewed diabetic diet, diabetic foot care, preferred BS, and HgbA1C levels. Follow up with your PCP as instructed, podiatry and ophthalmology yearly.    - Hemoglobin A1C; Future    6. Combined hyperlipidemia associated with type 2 diabetes mellitus  Lab Results   Component Value Date    CHOL 179 02/16/2023    CHOL 177 01/26/2022    CHOL 128 11/11/2021     Lab Results   Component Value Date    HDL 62 02/16/2023    HDL 61 01/26/2022    HDL  "30 (L) 11/11/2021     Lab Results   Component Value Date    LDLCALC 101.2 02/16/2023    LDLCALC 100.4 01/26/2022    LDLCALC 85 11/11/2021     No results found for: "DLDL"  Lab Results   Component Value Date    TRIG 79 02/16/2023    TRIG 78 01/26/2022    TRIG 65 11/11/2021       f1   Lab Results   Component Value Date    CHOLHDL 34.6 02/16/2023    CHOLHDL 34.5 01/26/2022    CHOLHDL 32.2 09/29/2021    Chronic and stable on statin medication. Continue current. F/u with pcp.      7. Hypertension associated with diabetes  Chronic and stable on BP medication.  Continue current management.  See med list above. Recommend low sodium diet. Follow up with PCP.       8. Amyloid heart disease  Chronic and stable. Continue current management. See med list above. Follow up with cardiology.      9. PAD (peripheral artery disease)  Chronic and stable on statin medication. Continue current. F/u with pcp.      10. Carotid artery disease, unspecified laterality, unspecified type  Chronic and stable on statin medication. Continue current. F/u with pcp.      11. Coronary artery disease involving native coronary artery of native heart without angina pectoris  Chronic and stable. Continue current management. See med list above. Follow up with cardiology.      12. Memory deficit  Cognitive function screening score for today.  No apparent Memory issues assessment today.  Follow up with PCP    13. BRAYAN (generalized anxiety disorder)  Chronic and stable. Continue current management. See med list above. Follow up with PCP.     14. Vitamin D deficiency  Chronic and stable. Continue current management. See med list above. Follow up with PCP.     15. History of left breast cancer  S/p treatment in the past. No current treatment.     16. Gastroesophageal reflux disease, unspecified whether esophagitis present  Chronic and stable. Continue current management. See med list above. Follow up with PCP.     17. Right thigh pain  Chronic and stable. " Continue current management.  Patient states she is not taking hydrocodone or tramadol.  See med list above. Follow up with PCP.     18. Tobacco use disorder  Chronic.  Patient states she is not ready to quit and declined smoking cessation program.  Counseling done.  Follow up with PCP.    19. Abnormality of gait and mobility  Chronic. Fall precautions recommended and discussed. Follow up with PCP.            Provided Danyelle with a 5-10 year written screening schedule and personal prevention plan. Recommendations were developed using the USPSTF age appropriate recommendations. Education, counseling, and referrals were provided as needed.  After Visit Summary printed and given to patient which includes a list of additional screenings\tests needed.    Follow up in about 1 year (around 10/3/2024) for annual wellness visit.      Brianna Obregon, MISHA    I offered to discuss advanced care planning, including how to pick a person who would make decisions for you if you were unable to make them for yourself, called a health care power of , and what kind of decisions you might make such as use of life sustaining treatments such as ventilators and tube feeding when faced with a life limiting illness recorded on a living will that they will need to know. (How you want to be cared for as you near the end of your natural life)     X  Patient is unwilling to engage in a discussion regarding advance directives at this time.

## 2023-10-03 NOTE — Clinical Note
Medicare awv complete. Health maintenance:  shingles vaccine, covid 19 updated vaccine, and flu vaccine due-encouraged pt to obtain at a pharmacy. Eye exam due-pt to call to schedule an appointment at [facility]. Hgba1c and diabetes urine screening due and ordered -message sent to ma to call pt to schedule.

## 2023-10-04 ENCOUNTER — TELEPHONE (OUTPATIENT)
Dept: ADMINISTRATIVE | Facility: CLINIC | Age: 83
End: 2023-10-04
Payer: MEDICARE

## 2023-10-04 NOTE — TELEPHONE ENCOUNTER
Called pt; informed pt I was calling to schedule to schedule appts for her hgba1c and diabetes urine screening per provider's message; pt stated she sees her pcp on 11/15/23 and would like to have that provider schedule the appts once she sees the provider. I did not schedule appts per pt request

## 2023-10-04 NOTE — TELEPHONE ENCOUNTER
----- Message from MISHA Blandon sent at 10/3/2023  2:02 PM CDT -----  Please call pt to schedule her hgba1c and diabetes urine screening.   Thank you

## 2023-10-25 NOTE — TELEPHONE ENCOUNTER
[facility].      Anticipated Discharge Disposition (OT): skilled nursing facility   I have put the following orders and/or medications to this note.  Please advise pt and assist.    Orders Placed This Encounter   Procedures    Mammo Digital Screening Bilat w/ [facility]ugio     Standing Status:   Future     Standing Expiration Date:   5/18/2024     Order Specific Question:   May the Radiologist modify the order per protocol to meet the clinical needs of the patient?     Answer:   Yes     Order Specific Question:   Release to patient     Answer:   Immediate

## 2023-11-10 ENCOUNTER — TELEPHONE (OUTPATIENT)
Dept: FAMILY MEDICINE | Facility: CLINIC | Age: 83
End: 2023-11-10
Payer: MEDICARE

## 2023-11-10 NOTE — TELEPHONE ENCOUNTER
----- Message from Carlos Benavidez sent at 11/10/2023 10:30 AM CST -----  Contact: otto  Patient stated that [facility] sent over paperwork orders for a scooter. Please call her back to advise if orders were received at [phone].      Thanks  DD

## 2023-11-15 ENCOUNTER — OFFICE VISIT (OUTPATIENT)
Dept: FAMILY MEDICINE | Facility: CLINIC | Age: 83
End: 2023-11-15
Attending: FAMILY MEDICINE
Payer: MEDICARE

## 2023-11-15 VITALS
WEIGHT: 141.13 LBS | RESPIRATION RATE: 16 BRPM | SYSTOLIC BLOOD PRESSURE: 100 MMHG | HEIGHT: 64 IN | HEART RATE: 90 BPM | BODY MASS INDEX: 24.1 KG/M2 | TEMPERATURE: 97 F | DIASTOLIC BLOOD PRESSURE: 60 MMHG | OXYGEN SATURATION: 98 %

## 2023-11-15 DIAGNOSIS — M25.551 PAIN OF RIGHT HIP: Primary | ICD-10-CM

## 2023-11-15 PROCEDURE — 1101F PT FALLS ASSESS-DOCD LE1/YR: CPT | Mod: HCNC,CPTII,S$GLB, | Performed by: NURSE PRACTITIONER

## 2023-11-15 PROCEDURE — 3288F FALL RISK ASSESSMENT DOCD: CPT | Mod: HCNC,CPTII,S$GLB, | Performed by: NURSE PRACTITIONER

## 2023-11-15 PROCEDURE — 1160F RVW MEDS BY RX/DR IN RCRD: CPT | Mod: HCNC,CPTII,S$GLB, | Performed by: NURSE PRACTITIONER

## 2023-11-15 PROCEDURE — 1159F MED LIST DOCD IN RCRD: CPT | Mod: HCNC,CPTII,S$GLB, | Performed by: NURSE PRACTITIONER

## 2023-11-15 PROCEDURE — 99213 OFFICE O/P EST LOW 20 MIN: CPT | Mod: HCNC,S$GLB,, | Performed by: NURSE PRACTITIONER

## 2023-11-15 PROCEDURE — 99999 PR PBB SHADOW E&M-EST. PATIENT-LVL IV: ICD-10-PCS | Mod: PBBFAC,HCNC,, | Performed by: NURSE PRACTITIONER

## 2023-11-15 PROCEDURE — 99213 PR OFFICE/OUTPT VISIT, EST, LEVL III, 20-29 MIN: ICD-10-PCS | Mod: HCNC,S$GLB,, | Performed by: NURSE PRACTITIONER

## 2023-11-15 PROCEDURE — 3074F PR MOST RECENT SYSTOLIC BLOOD PRESSURE < 130 MM HG: ICD-10-PCS | Mod: HCNC,CPTII,S$GLB, | Performed by: NURSE PRACTITIONER

## 2023-11-15 PROCEDURE — 99999 PR PBB SHADOW E&M-EST. PATIENT-LVL IV: CPT | Mod: PBBFAC,HCNC,, | Performed by: NURSE PRACTITIONER

## 2023-11-15 PROCEDURE — 1160F PR REVIEW ALL MEDS BY PRESCRIBER/CLIN PHARMACIST DOCUMENTED: ICD-10-PCS | Mod: HCNC,CPTII,S$GLB, | Performed by: NURSE PRACTITIONER

## 2023-11-15 PROCEDURE — 3074F SYST BP LT 130 MM HG: CPT | Mod: HCNC,CPTII,S$GLB, | Performed by: NURSE PRACTITIONER

## 2023-11-15 PROCEDURE — 3078F DIAST BP <80 MM HG: CPT | Mod: HCNC,CPTII,S$GLB, | Performed by: NURSE PRACTITIONER

## 2023-11-15 PROCEDURE — 1159F PR MEDICATION LIST DOCUMENTED IN MEDICAL RECORD: ICD-10-PCS | Mod: HCNC,CPTII,S$GLB, | Performed by: NURSE PRACTITIONER

## 2023-11-15 PROCEDURE — 3078F PR MOST RECENT DIASTOLIC BLOOD PRESSURE < 80 MM HG: ICD-10-PCS | Mod: HCNC,CPTII,S$GLB, | Performed by: NURSE PRACTITIONER

## 2023-11-15 PROCEDURE — 1101F PR PT FALLS ASSESS DOC 0-1 FALLS W/OUT INJ PAST YR: ICD-10-PCS | Mod: HCNC,CPTII,S$GLB, | Performed by: NURSE PRACTITIONER

## 2023-11-15 PROCEDURE — 3288F PR FALLS RISK ASSESSMENT DOCUMENTED: ICD-10-PCS | Mod: HCNC,CPTII,S$GLB, | Performed by: NURSE PRACTITIONER

## 2023-11-15 RX ORDER — TRAMADOL HYDROCHLORIDE 50 MG/1
50 TABLET ORAL
Qty: 7 TABLET | Refills: 0 | Status: SHIPPED | OUTPATIENT
Start: 2023-11-15 | End: 2024-01-18

## 2023-11-15 NOTE — PROGRESS NOTES
Danyelle Garcia  11/15/2023  [id number]    Dilma Chan MD  Patient Care Team:  Dilma Chan MD as PCP - General (Family Medicine)  Kevin Olson LPN as Care Coordinator          Visit Type:a scheduled routine follow-up visit    Chief Complaint:  No chief complaint on file.      History of Present Illness:    82 yo female presents today or follow up exam. Pt states she is having a stent placed Dec 23 but her chart notes states she is having a pacemaker placed for bradycardia.   Pt also expresses disappointment  that she had to be evaluated again by the NP and only wants appointments with Dr. Chan in the future.  Pt is also requesting something stronger than NSAID for her chronic hip pain.  Pt inquiring about the status of her scooter. Only a form for a rolator was found   History:  Past Medical History:   Diagnosis Date    Breast cancer 2006    left breast treated with lumpectomy, radiation, and chemo    CAD (coronary artery disease)     Colon polyp     Congestive heart failure 11/23/2021    Congestive heart failure     Diabetes mellitus, type 2     Diverticular disease     Dizziness     External hemorrhoid     H. pylori infection     Headache     Hyperlipidemia     Hypertension     Insomnia     Osteopenia     Postmenopausal     Tobacco use     Vitamin D deficiency disease      Past Surgical History:   Procedure Laterality Date    BREAST LUMPECTOMY Left     CATARACT EXTRACTION      CHOLECYSTECTOMY      COLONOSCOPY      COLONOSCOPY N/A 7/7/2020    Procedure: COLONOSCOPY rapid covid-19;  Surgeon: Sean Paz MD;  Location: [facility];  Service: Endoscopy;  Laterality: N/A;    EYE SURGERY Right     cataract    HYSTERECTOMY      left lumpectomy      TOTAL ABDOMINAL HYSTERECTOMY W/ BILATERAL SALPINGOOPHORECTOMY      Due to benign reasons per patient    UPPER GASTROINTESTINAL ENDOSCOPY       Family History   Problem Relation Age of Onset    Hypertension Mother     Heart attack Father     Heart  disease Father         MI/CAD    Diabetes Daughter     Diabetes Son     No Known Problems Daughter     No Known Problems Son     Cancer Neg Hx     Stroke Neg Hx      Social History     Socioeconomic History    Marital status:     Number of children: 4   Occupational History    Occupation: Retired   Tobacco Use    Smoking status: Light Smoker     Current packs/day: 0.30     Average packs/day: 0.3 packs/day for 67.9 years (20.4 ttl pk-yrs)     Types: Cigarettes     Start date: 1956    Smokeless tobacco: Never   Substance and Sexual Activity    Alcohol use: Yes     Comment: 3 beers per month    Drug use: No    Sexual activity: Not Currently     Birth control/protection: Post-menopausal   Social History Narrative    She wears seatbelt. She lives alone and continues to drive.     Social Determinants of Health     Financial Resource Strain: Low Risk  (10/3/2023)    Overall Financial Resource Strain (CARDIA)     Difficulty of Paying Living Expenses: Not hard at all   Food Insecurity: No Food Insecurity (10/3/2023)    Hunger Vital Sign     Worried About Running Out of Food in the Last Year: Never true     Ran Out of Food in the Last Year: Never true   Transportation Needs: No Transportation Needs (10/3/2023)    PRAPARE - Transportation     Lack of Transportation (Medical): No     Lack of Transportation (Non-Medical): No   Physical Activity: Inactive (10/3/2023)    Exercise Vital Sign     Days of Exercise per Week: 0 days     Minutes of Exercise per Session: 0 min   Stress: No Stress Concern Present (10/3/2023)    [employer] - Occupational Stress Questionnaire     Feeling of Stress : Not at all   Social Connections: Moderately Isolated (10/3/2023)    Social Connection and Isolation Panel [NHANES]     Frequency of Communication with Friends and Family: Three times a week     Frequency of Social Gatherings with Friends and Family: Three times a week     Attends [employer] Services: More  than 4 times per year     Active Member of Clubs or Organizations: No     Attends Club or Organization Meetings: Never     Marital Status:    Housing Stability: Low Risk  (10/3/2023)    Housing Stability Vital Sign     Unable to Pay for Housing in the Last Year: No     Number of Places Lived in the Last Year: 1     Unstable Housing in the Last Year: No     Patient Active Problem List   Diagnosis    Tobacco use disorder    Disorder of bone and cartilage    Asymptomatic postmenopausal status    CAD (coronary artery disease)    Vitamin D deficiency    PAD (peripheral artery disease)    Right shoulder pain    Hypertension associated with diabetes    Combined hyperlipidemia associated with type 2 diabetes mellitus    Epigastric pain    History of left breast cancer    Gastroesophageal reflux disease    Diabetes mellitus with neurological manifestations, controlled    Type 2 diabetes mellitus with peripheral artery disease    Carotid artery disease    Memory deficit    Type 2 diabetes mellitus without retinopathy    Serum calcium elevated    Right thigh pain    Benign colon polyp    Congestive heart failure    Amyloid heart disease    Overweight (BMI 25.0-29.9)    BRAYAN (generalized anxiety disorder)     Review of patient's allergies indicates:  No Known Allergies    The following were reviewed at this visit: active problem list, medication list, allergies, family history, social history, and health maintenance.    Medications:  Current Outpatient Medications on File Prior to Visit   Medication Sig Dispense Refill    alcohol swabs PadM Apply 1 each topically as needed. 100 each 11    aspirin (ECOTRIN) 81 MG EC tablet Take 81 mg by mouth daily as needed for Pain.      atorvastatin (LIPITOR) 80 MG tablet TAKE 1 TABLET(80 MG) BY MOUTH EVERY EVENING 90 tablet 1    empagliflozin (JARDIANCE) 10 mg tablet Take 10 mg by mouth once daily.      ergocalciferol (ERGOCALCIFEROL) 50,000 unit Cap Take 1 capsule (50,000 Units  total) by mouth every 14 (fourteen) days. 6 capsule 1    furosemide (LASIX) 40 MG tablet Take 40 mg by mouth once daily.      GABAPENTIN ORAL Take 100 mg by mouth 3 (three) times daily.      levocetirizine (XYZAL) 5 MG tablet Take 1 tablet (5 mg total) by mouth every evening. 30 tablet 11    memantine (NAMENDA) 5 MG Tab TAKE 1 TABLET(5 MG) BY MOUTH TWICE DAILY 180 tablet 1    naproxen sodium (ALEVE) 220 mg Cap Take 2 capsules by mouth daily as needed (headache).      polyethylene glycol (GLYCOLAX) 17 gram/dose powder Take 17 g by mouth once daily. 510 g 11    promethazine-dextromethorphan (PROMETHAZINE-DM) 6.25-15 mg/5 mL Syrp Take 10 mLs by mouth every 6 (six) hours as needed (cough).      sacubitriL-valsartan (ENTRESTO) 24-26 mg per tablet Take 1 tablet by mouth 2 (two) times daily.      blood glucose control, normal (ACCU-CHEK SMARTVIEW CONTRL SOL) Soln 1 each by Misc.(Non-Drug; Combo Route) route 2 (two) times daily. 1 each 0    blood-glucose meter kit Use as instructed 1 each 0    spironolactone (ALDACTONE) 25 MG tablet Take 25 mg by mouth.      TRUE METRIX GLUCOSE TEST STRIP Strp TEST TWICE DAILY (Patient not taking: Reported on 10/3/2023) 200 strip 3    TRUEPLUS LANCETS 28 gauge Misc USE TO TEST BLOOD SUGAR TWICE DAILY (Patient not taking: Reported on 10/3/2023) 100 each 0    ULTRA THIN LANCETS 30 gauge Misc USE TWICE DAILY (Patient not taking: Reported on 10/3/2023) 200 each 3     No current facility-administered medications on file prior to visit.       Medications have been reviewed and reconciled with patient at this visit.  Barriers to medications reviewed with patient.    Adverse reactions to current medications reviewed with patient..    Over the counter medications reviewed and reconciled with patient.    Exam:  Wt Readings from Last 3 Encounters:   11/15/23 64 kg (141 lb 1.5 oz)   10/03/23 68.5 kg (151 lb)   09/19/23 67.7 kg (149 lb 4 oz)     Temp Readings from Last 3 Encounters:   11/15/23 97.1 °F  (36.2 °C) (Tympanic)   10/03/23 97.7 °F (36.5 °C) (Temporal)   08/31/23 98.3 °F (36.8 °C) (Tympanic)     BP Readings from Last 3 Encounters:   11/15/23 100/60   10/03/23 (!) 113/55   09/19/23 (!) 141/77     Pulse Readings from Last 3 Encounters:   11/15/23 90   10/03/23 61   09/19/23 (!) 54     Body mass index is 24.22 kg/m².      Review of Systems   Constitutional:  Negative for fever.   Respiratory:  Negative for cough, shortness of breath and wheezing.    Cardiovascular:  Negative for chest pain and palpitations.   Gastrointestinal:  Negative for nausea.   Musculoskeletal:  Positive for joint pain.   Neurological:  Negative for speech change, weakness and headaches.   All other systems reviewed and are negative.    Physical Exam  Vitals and nursing note reviewed.   Constitutional:       Appearance: Normal appearance. She is normal weight.   HENT:      Head: Normocephalic and atraumatic.      Right Ear: Tympanic membrane, ear canal and external ear normal.      Left Ear: Tympanic membrane, ear canal and external ear normal.      Nose: Nose normal.      Mouth/Throat:      Mouth: Mucous membranes are moist.      Pharynx: Oropharynx is clear.   Eyes:      Extraocular Movements: Extraocular movements intact.      Conjunctiva/sclera: Conjunctivae normal.      Pupils: Pupils are equal, round, and reactive to light.   Cardiovascular:      Rate and Rhythm: Normal rate and regular rhythm.      Pulses: Normal pulses.      Heart sounds: Normal heart sounds.   Pulmonary:      Effort: Pulmonary effort is normal.      Breath sounds: Normal breath sounds.   Abdominal:      General: Bowel sounds are normal.      Palpations: Abdomen is soft.   Musculoskeletal:         General: Normal range of motion.      Cervical back: Normal range of motion and neck supple.        Legs:    Skin:     General: Skin is warm and dry.      Capillary Refill: Capillary refill takes less than 2 seconds.   Neurological:      General: No focal deficit  present.      Mental Status: She is alert and oriented to person, place, and time.   Psychiatric:         Mood and Affect: Mood normal.         Behavior: Behavior normal.         Thought Content: Thought content normal.         Judgment: Judgment normal.         Laboratory Reviewed ({Yes)  Lab Results   Component Value Date    WBC 7.00 02/16/2023    HGB 11.3 (L) 02/16/2023    HCT 35.2 (L) 02/16/2023     02/16/2023    CHOL 179 02/16/2023    TRIG 79 02/16/2023    HDL 62 02/16/2023    ALT 15 02/16/2023    AST 20 02/16/2023     (L) 10/26/2023    K 5.1 10/26/2023     10/26/2023    CREATININE 1.00 10/26/2023    BUN 19 10/26/2023    CO2 21 (L) 10/26/2023    TSH 1.218 02/16/2023    HGBA1C 5.9 (H) 02/16/2023       Diagnoses and all orders for this visit:    Pain of right hip  -     traMADoL (ULTRAM) 50 mg tablet; Take 1 tablet (50 mg total) by mouth every 24 hours as needed for Pain.        PLAN  1 Start ultram for pain      Care Plan/Goals: Reviewed    Goals    None     Diagnoses and all orders for this visit:    Pain of right hip  -     traMADoL (ULTRAM) 50 mg tablet; Take 1 tablet (50 mg total) by mouth every 24 hours as needed for Pain.         Follow up: Follow up in about 2 months (around 1/15/2024) for with  for 2 month follow up.    After visit summary was printed and given to patient upon discharge today.  Patient goals and care plan are included in After Visit Summary.

## 2023-11-17 ENCOUNTER — TELEPHONE (OUTPATIENT)
Dept: FAMILY MEDICINE | Facility: CLINIC | Age: 83
End: 2023-11-17
Payer: MEDICARE

## 2023-12-04 DIAGNOSIS — E55.9 VITAMIN D DEFICIENCY: ICD-10-CM

## 2023-12-04 RX ORDER — ERGOCALCIFEROL 1.25 MG/1
50000 CAPSULE ORAL
Qty: 6 CAPSULE | Refills: 1 | Status: SHIPPED | OUTPATIENT
Start: 2023-12-04

## 2023-12-04 NOTE — TELEPHONE ENCOUNTER
----- Message from Jaqueline Tovar sent at 12/4/2023  9:36 AM CST -----  Contact: Danyelle  Type:  RX Refill Request    Who Called:  Danyelle  Refill or New Rx: refill   RX Name and Strength: Vitamin (I'm sorry she dont know the name of it)  How is the patient currently taking it? (ex. 1XDay):   Is this a 30 day or 90 day RX:   Preferred Pharmacy with phone number:   Local or Mail Order:  Mail order  Ordering Provider: Dr. Chan  Would the patient rather a call back or a response via [facility]? Call back   Best Call Back Number: Please call her at  [phone]  Additional Information: She stated that the office sends it as a mail order but she is not sure where the mail order will come from

## 2024-01-18 ENCOUNTER — OFFICE VISIT (OUTPATIENT)
Dept: FAMILY MEDICINE | Facility: CLINIC | Age: 84
End: 2024-01-18
Attending: FAMILY MEDICINE
Payer: MEDICARE

## 2024-01-18 ENCOUNTER — LAB VISIT (OUTPATIENT)
Dept: LAB | Facility: HOSPITAL | Age: 84
End: 2024-01-18
Attending: FAMILY MEDICINE
Payer: MEDICARE

## 2024-01-18 VITALS
HEIGHT: 64 IN | HEART RATE: 76 BPM | RESPIRATION RATE: 18 BRPM | DIASTOLIC BLOOD PRESSURE: 66 MMHG | WEIGHT: 145.5 LBS | TEMPERATURE: 99 F | BODY MASS INDEX: 24.84 KG/M2 | SYSTOLIC BLOOD PRESSURE: 110 MMHG | OXYGEN SATURATION: 96 %

## 2024-01-18 DIAGNOSIS — E11.51 TYPE 2 DIABETES MELLITUS WITH PERIPHERAL ARTERY DISEASE: ICD-10-CM

## 2024-01-18 DIAGNOSIS — E78.2 COMBINED HYPERLIPIDEMIA ASSOCIATED WITH TYPE 2 DIABETES MELLITUS: ICD-10-CM

## 2024-01-18 DIAGNOSIS — E55.9 VITAMIN D DEFICIENCY: ICD-10-CM

## 2024-01-18 DIAGNOSIS — E11.69 COMBINED HYPERLIPIDEMIA ASSOCIATED WITH TYPE 2 DIABETES MELLITUS: ICD-10-CM

## 2024-01-18 DIAGNOSIS — I15.2 HYPERTENSION ASSOCIATED WITH DIABETES: ICD-10-CM

## 2024-01-18 DIAGNOSIS — Z78.0 ASYMPTOMATIC POSTMENOPAUSAL STATUS: ICD-10-CM

## 2024-01-18 DIAGNOSIS — E11.59 HYPERTENSION ASSOCIATED WITH DIABETES: ICD-10-CM

## 2024-01-18 DIAGNOSIS — F17.200 TOBACCO USE DISORDER: ICD-10-CM

## 2024-01-18 DIAGNOSIS — Z12.31 OTHER SCREENING MAMMOGRAM: ICD-10-CM

## 2024-01-18 DIAGNOSIS — I49.5 SINUS NODE DYSFUNCTION: ICD-10-CM

## 2024-01-18 DIAGNOSIS — E85.4 AMYLOID HEART DISEASE: ICD-10-CM

## 2024-01-18 DIAGNOSIS — I77.9 CAROTID ARTERY DISEASE, UNSPECIFIED LATERALITY, UNSPECIFIED TYPE: ICD-10-CM

## 2024-01-18 DIAGNOSIS — Z00.00 ANNUAL PHYSICAL EXAM: Primary | ICD-10-CM

## 2024-01-18 DIAGNOSIS — M89.9 DISORDER OF BONE AND CARTILAGE: ICD-10-CM

## 2024-01-18 DIAGNOSIS — R41.3 MEMORY DEFICIT: ICD-10-CM

## 2024-01-18 DIAGNOSIS — Z85.3 HISTORY OF LEFT BREAST CANCER: ICD-10-CM

## 2024-01-18 DIAGNOSIS — I43 AMYLOID HEART DISEASE: ICD-10-CM

## 2024-01-18 DIAGNOSIS — I25.10 CORONARY ARTERY DISEASE INVOLVING NATIVE CORONARY ARTERY OF NATIVE HEART WITHOUT ANGINA PECTORIS: ICD-10-CM

## 2024-01-18 DIAGNOSIS — I50.9 CONGESTIVE HEART FAILURE, UNSPECIFIED HF CHRONICITY, UNSPECIFIED HEART FAILURE TYPE: ICD-10-CM

## 2024-01-18 DIAGNOSIS — M94.9 DISORDER OF BONE AND CARTILAGE: ICD-10-CM

## 2024-01-18 DIAGNOSIS — I73.9 PAD (PERIPHERAL ARTERY DISEASE): ICD-10-CM

## 2024-01-18 PROCEDURE — 1160F RVW MEDS BY RX/DR IN RCRD: CPT | Mod: HCNC,CPTII,S$GLB, | Performed by: FAMILY MEDICINE

## 2024-01-18 PROCEDURE — 82043 UR ALBUMIN QUANTITATIVE: CPT | Mod: HCNC | Performed by: FAMILY MEDICINE

## 2024-01-18 PROCEDURE — 99999 PR PBB SHADOW E&M-EST. PATIENT-LVL IV: CPT | Mod: PBBFAC,HCNC,, | Performed by: FAMILY MEDICINE

## 2024-01-18 PROCEDURE — 1101F PT FALLS ASSESS-DOCD LE1/YR: CPT | Mod: HCNC,CPTII,S$GLB, | Performed by: FAMILY MEDICINE

## 2024-01-18 PROCEDURE — 85025 COMPLETE CBC W/AUTO DIFF WBC: CPT | Mod: HCNC | Performed by: FAMILY MEDICINE

## 2024-01-18 PROCEDURE — 3078F DIAST BP <80 MM HG: CPT | Mod: HCNC,CPTII,S$GLB, | Performed by: FAMILY MEDICINE

## 2024-01-18 PROCEDURE — 80053 COMPREHEN METABOLIC PANEL: CPT | Mod: HCNC | Performed by: FAMILY MEDICINE

## 2024-01-18 PROCEDURE — 80061 LIPID PANEL: CPT | Mod: HCNC | Performed by: FAMILY MEDICINE

## 2024-01-18 PROCEDURE — 3074F SYST BP LT 130 MM HG: CPT | Mod: HCNC,CPTII,S$GLB, | Performed by: FAMILY MEDICINE

## 2024-01-18 PROCEDURE — 99397 PER PM REEVAL EST PAT 65+ YR: CPT | Mod: HCNC,S$GLB,, | Performed by: FAMILY MEDICINE

## 2024-01-18 PROCEDURE — 83036 HEMOGLOBIN GLYCOSYLATED A1C: CPT | Mod: HCNC | Performed by: FAMILY MEDICINE

## 2024-01-18 PROCEDURE — 3288F FALL RISK ASSESSMENT DOCD: CPT | Mod: HCNC,CPTII,S$GLB, | Performed by: FAMILY MEDICINE

## 2024-01-18 PROCEDURE — 1126F AMNT PAIN NOTED NONE PRSNT: CPT | Mod: HCNC,CPTII,S$GLB, | Performed by: FAMILY MEDICINE

## 2024-01-18 PROCEDURE — 1159F MED LIST DOCD IN RCRD: CPT | Mod: HCNC,CPTII,S$GLB, | Performed by: FAMILY MEDICINE

## 2024-01-18 PROCEDURE — 84443 ASSAY THYROID STIM HORMONE: CPT | Mod: HCNC | Performed by: FAMILY MEDICINE

## 2024-01-18 PROCEDURE — 36415 COLL VENOUS BLD VENIPUNCTURE: CPT | Mod: HCNC,PO | Performed by: FAMILY MEDICINE

## 2024-01-18 RX ORDER — MEMANTINE HYDROCHLORIDE 5 MG/1
TABLET ORAL
Qty: 180 TABLET | Refills: 1 | Status: SHIPPED | OUTPATIENT
Start: 2024-01-18

## 2024-01-18 NOTE — PROGRESS NOTES
HISTORY OF PRESENT ILLNESS: Ms. Garcia comes in today non fasting and without taking medications for annual wellness exam. She reports no acute problems today.     END OF LIFE DECISION: She has a living will and does not desire life support.    Ms. Garcia comes in today for annual wellness examination.  She states she is not fasting but has taken medication today.     She states she had pacemaker placed approximately 1 month ago; she complains of having weakness but states she is getting her strength back.  She states she received dentures 3-4 weeks ago.  She states she continues to smoke cigarettes sometimes.  She states she does not perform home glucose checks. However, she states she receives home health services-nurse visit once a week for blood pressure check.      She states she is scheduled to follow-up with cardiology and neurology on January 23, 2024. Her last visit with Dr. Venkat Holden, cardiologist, was on December 17, 2023 for cardiac pacemaker in situ, LBBB (left bundle branch block), chronic systolic CHF. Her last visit with Dr. Chavo Gray, neurologist, was July 19, 2023 for headache, carotid artery stenosis, dizziness, migraine.    She states she received COVID booster, flu shot, shingles shot on October 13, 2023.    She states she continues to live alone. She is accompanied today by her neighbor Artemio Landeros.                             She saw DORON Valentin with Dr. José Miguel Jeff, vascular surgeon, on November 6, 2023 for bilateral carotid artery stenosis.                      She saw Chavo Gray with psychiatry on November 28, 2022 for abnormal levels of other serum enzymes, deficiency of other                  specified B group vitamins, trigeminal  neuralgia, and other specified dorsopathies, site unspecified. She saw Dr. Elmer Gupta,                         psychiatrist, on September 1, 2020 for surveillance of generalized anxiety disorder,  memory deficits and insomnia.      She continues to occasionally smoke and states she continues to work on quitting but states she no longer uses Nicotine patches.    Current Outpatient Medications   Medication Sig    alcohol swabs PadM Apply 1 each topically as needed.    aspirin (ECOTRIN) 81 MG EC tablet Take 81 mg by mouth daily as needed for Pain.    atorvastatin (LIPITOR) 80 MG tablet TAKE 1 TABLET(80 MG) BY MOUTH EVERY EVENING    empagliflozin (JARDIANCE) 10 mg tablet Take 10 mg by mouth once daily.    ergocalciferol (ERGOCALCIFEROL) 50,000 unit Cap Take 1 capsule (50,000 Units total) by mouth every 14 (fourteen) days.    furosemide (LASIX) 40 MG tablet Take 40 mg by mouth once daily.    GABAPENTIN ORAL Take 100 mg by mouth 3 (three) times daily.    memantine (NAMENDA) 5 MG Tab TAKE 1 TABLET(5 MG) BY MOUTH TWICE DAILY    naproxen sodium (ALEVE) 220 mg Cap Take 2 capsules by mouth daily as needed (headache).    sacubitriL-valsartan (ENTRESTO) 24-26 mg per tablet Take 1 tablet by mouth 2 (two) times daily.    TRUE METRIX GLUCOSE TEST STRIP Strp TEST TWICE DAILY    TRUEPLUS LANCETS 28 gauge Misc USE TO TEST BLOOD SUGAR TWICE DAILY    ULTRA THIN LANCETS 30 gauge Misc USE TWICE DAILY    blood glucose control, normal (ACCU-CHEK SMARTVIEW CONTRL SOL) Soln 1 each by Misc.(Non-Drug; Combo Route) route 2 (two) times daily.    blood-glucose meter kit Use as instructed    levocetirizine (XYZAL) 5 MG tablet Take 1 tablet (5 mg total) by mouth every evening. (Patient not taking: Reported on 1/18/2024)    spironolactone (ALDACTONE) 25 MG tablet Take 25 mg by mouth.     SCREENINGS:   Cholesterol: February 13, 2023.   FFS/Colonoscopy: July 7, 2020 - benign colon polyp, diverticulosis; repeat in 5 years.   Mammogram: July 11, 2023 - benign.   GYN Exam (Breast exam): February 13, 2023.   Dexa Scan: July 11, 2023 - okay. March 16, 2018 - osteopenia.   Eye Exam: July 19, 2017 with Dr. Cruz. She declines and states she will schedule herself.  Dental Exam: 3  to 4 weeks ago per patient. She wears top dentures only.   PPD: Negative in the past.  Immunizations: Td/Tdap - less than 10 years ago per patient.  Gardasil - N./A.  Zostavax - June 14, 2012.  Shingrix - October 13, 2023.  Pneumovax - March 26, 2014.    Prevnar-13 shot - March 29, 2016.    Seasonal Flu - October 13, 2023.    Covid-19 (Moderna) vaccine series - February 3, 2021, March 3, 2021, December 16, 2021; reports also October 13, 2023..    ROS:  GENERAL: Denies fever, chills, fatigue or unusual weight change. Appetite good. Weight 72.8 kg (160 lb 7.9 oz) at February 16, 2023 visit. Reports not exercises. Reports monitors diet.   SKIN: Denies rashes, itching, changes in mole, color or texture of skin or easy bruising.   HEAD: Denies headaches or recent head trauma.   EYES: Denies change in vision, pain, diplopia, redness or discharge. Wears glasses.  EARS: Denies ear pain, discharge, vertigo or decreased hearing.   NOSE: Denies loss of smell, epistaxis or rhinitis.  MOUTH & THROAT: Denies hoarseness or change in voice. Denies excessive gum bleeding or mouth sores. Denies sore throat.  NODES: Denies swollen glands.  CHEST: Denies BRITO, wheezing, cough, or sputum production.  CARDIOVASCULAR: Denies PND, chest pain, orthopnea or reduced exercise tolerance. Denies palpitations. See history of present illness.  ABDOMEN: Denies constipation, nausea, vomiting, diarrhea, abdominal pain, or blood in stool.   URINARY: Denies flank pain, dysuria or hematuria.  GENITOURINARY: Denies flank pain, dysuria, frequency or hematuria. Performs monthly breast self exams. See history of present illness.  ENDOCRINE: Denies thyroid problems. See history of present illness..  HEME/LYMPH: Denies bleeding problems.  PERIPHERAL VASCULAR:Denies claudication or cyanosis.  MUSCULOSKELETAL: Denies pain, stiffness, edema.   NEUROLOGIC: Denies history of seizures, tremors, paralysis, alteration of gait or coordination. Reports has stable,  "chronic memory issue. See history of present illness.  PSYCHIATRIC: Denies mood swings, depression, anxiety, homicidal or suicidal thoughts. Denies sleep problems.     PE:   Blood Pressure 110/66   Pulse 76   Temperature 98.5 °F (36.9 °C) (Tympanic)   Respiration 18   Height 5' 4" (1.626 m)   Weight 66 kg (145 lb 8.1 oz)   Oxygen Saturation 96%   Body Mass Index 24.98 kg/m²   APPEARANCE: Well nourished, well developed female, elderly and pleasant, alert and oriented in no acute distress.   HEAD: Nontender. Full range of motion.  EYES: PERRL, conjunctiva pink, lids no edema. She wears glasses.   EARS: External canal patent except wax in right ear. No swelling or redness noted. Left TM shiny and clear. Right TM not visualized due to wax.  NOSE: Mucosa and turbinates pink, not swollen. No discharge. Nontender sinuses.  THROAT: No pharyngeal erythema or exudate. No stridor. She has top dentures.   NECK: Supple, no mass, thyroid not enlarged.  NODES: No cervica or axillary lymph node enlargement.  CHEST: Normal respiratory effort. Lungs clear to auscultation.  CARDIOVASCULAR: Bradycardia today with normal S1, S2. No rubs, murmurs or gallops. PMI not displaced. Left carotid bruit not heard today. Pacemaker at right anterior chest wall. Feet look okay without ulcerations or skin breaks. Chronic slightly decreased pedal pulses palpable bilaterally. No edema.  ABDOMEN: Bowel sounds present. Not distended. Soft. No tenderness, masses or organomegaly.  BREAST EXAM: Bilateral breast exam unremarkable.  PELVIC EXAM: Bimanual examination not examined as patient declines and as patient has had JACQUELINE/BSO due to noncancerous reasons.   RECTAL EXAM: Not examined per patient request.   MUSCULOSKELETAL: No joint deformities or stiffness. She is ambulatory without problems.  SKIN: No rashes or suspicious lesions, normal color and turgor.  NEUROLOGIC: Cranial Nerves: II-XII grossly intact. DTR's: Knees, Ankles 2+ and equal " bilaterally. Monofilament test unremarkable. Gait & Posture: Normal gait and fine motion.  PSYCHIATRIC: Patient alert, oriented x 3. Mood/Affect normal without acute anxiety depression noted. Judgment/insight good as she makes appropriate decisions during today's examination but chronic, slight decreased memory deficit noted.    Protective Sensation (w/ 10 gram monofilament):  Right: Intact  Left: Intact    Visual Inspection:  Normal -  Bilateral    Pedal Pulses:   Right: Diminished - chronic.  Left: Diminished - chronic.    Posterior tibialis:   Right:Diminished - chronic.  Left: Diminished - chronic.     ASSESSMENT:    ICD-10-CM ICD-9-CM    1. Annual physical exam  Z00.00 V70.0       2. Hypertension associated with diabetes  E11.59 250.80 TSH    I15.2 401.9 Comprehensive Metabolic Panel      Lipid Panel      CBC Auto Differential      3. Type 2 diabetes mellitus with peripheral artery disease  E11.51 250.70 Microalbumin/Creatinine Ratio, Urine     443.81 Hemoglobin A1C      4. Combined hyperlipidemia associated with type 2 diabetes mellitus  E11.69 250.80 Comprehensive Metabolic Panel    E78.2 272.2 Lipid Panel      5. Coronary artery disease involving native coronary artery of native heart without angina pectoris  I25.10 414.01       6. Congestive heart failure, unspecified HF chronicity, unspecified heart failure type  I50.9 428.0       7. PAD (peripheral artery disease)  I73.9 443.9       8. Carotid artery disease, unspecified laterality, unspecified type  I77.9 447.9       9. Tobacco use disorder  F17.200 305.1       10. Other screening mammogram  Z12.31 V76.12 Mammo Digital Screening Bilat w/ [facility]ugio      11. 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 Asymptomatic postmenopausal status  Z78.0 V49.81       14. History of left breast cancer  Z85.3 V10.3       15. Vitamin D deficiency  E55.9 268.9       16. Disorder of bone and cartilage  M89.9 733.90      M94.9        17. Sinus node dysfunction  I49.5 427.81             PLAN:  1. Age-appropriate counseling-appropriate low-sodium, low-cholesterol, low carbohydrate diet and exercise daily, monthly breast self exam, annual wellness examination.   2. Patient advised to call for results.  3. Continue current medications.  4. Prescription refills as noted above.  5. Annual eye and dental examinations; I offered to schedule patient for eye appointment but she declined.  6. Keep follow up with specialists.  7. Smoking cessation advised.   8. Keep follow up with specialists.  9. Follow up in about 6 months (around 7/18/2024) for diabetes and hypertension follow up.    40 minutes of total time spent on the encounter, which includes face to face time and non-face to face time preparing to see the patient (eg, review of tests), Obtaining and/or reviewing separately obtained history, Documenting clinical information in the electronic or other health record, Independently interpreting results (not separately reported) and communicating results to the patient/family/caregiver, or Care coordination (not separately reported).      This note is  generated with speech recognition software and is subject to transcription error and sound alike phrases that may be missed by proofreading.

## 2024-01-19 LAB
ALBUMIN SERPL BCP-MCNC: 3.7 G/DL (ref 3.5–5.2)
ALBUMIN/CREAT UR: ABNORMAL UG/MG (ref 0–30)
ALP SERPL-CCNC: 130 U/L (ref 55–135)
ALT SERPL W/O P-5'-P-CCNC: 18 U/L (ref 10–44)
ANION GAP SERPL CALC-SCNC: 12 MMOL/L (ref 8–16)
AST SERPL-CCNC: 17 U/L (ref 10–40)
BASOPHILS # BLD AUTO: 0.07 K/UL (ref 0–0.2)
BASOPHILS NFR BLD: 0.7 % (ref 0–1.9)
BILIRUB SERPL-MCNC: 0.3 MG/DL (ref 0.1–1)
BUN SERPL-MCNC: 30 MG/DL (ref 8–23)
CALCIUM SERPL-MCNC: 10.5 MG/DL (ref 8.7–10.5)
CHLORIDE SERPL-SCNC: 100 MMOL/L (ref 95–110)
CHOLEST SERPL-MCNC: 155 MG/DL (ref 120–199)
CHOLEST/HDLC SERPL: 2.9 {RATIO} (ref 2–5)
CO2 SERPL-SCNC: 23 MMOL/L (ref 23–29)
CREAT SERPL-MCNC: 1.2 MG/DL (ref 0.5–1.4)
CREAT UR-MCNC: 13 MG/DL (ref 15–325)
DIFFERENTIAL METHOD BLD: ABNORMAL
EOSINOPHIL # BLD AUTO: 0.1 K/UL (ref 0–0.5)
EOSINOPHIL NFR BLD: 1.4 % (ref 0–8)
ERYTHROCYTE [DISTWIDTH] IN BLOOD BY AUTOMATED COUNT: 14.4 % (ref 11.5–14.5)
EST. GFR  (NO RACE VARIABLE): 44.9 ML/MIN/1.73 M^2
ESTIMATED AVG GLUCOSE: 131 MG/DL (ref 68–131)
GLUCOSE SERPL-MCNC: 101 MG/DL (ref 70–110)
HBA1C MFR BLD: 6.2 % (ref 4–5.6)
HCT VFR BLD AUTO: 37.4 % (ref 37–48.5)
HDLC SERPL-MCNC: 54 MG/DL (ref 40–75)
HDLC SERPL: 34.8 % (ref 20–50)
HGB BLD-MCNC: 12.2 G/DL (ref 12–16)
IMM GRANULOCYTES # BLD AUTO: 0.3 K/UL (ref 0–0.04)
IMM GRANULOCYTES NFR BLD AUTO: 3.1 % (ref 0–0.5)
LDLC SERPL CALC-MCNC: 88.8 MG/DL (ref 63–159)
LYMPHOCYTES # BLD AUTO: 2.4 K/UL (ref 1–4.8)
LYMPHOCYTES NFR BLD: 25.4 % (ref 18–48)
MCH RBC QN AUTO: 31.9 PG (ref 27–31)
MCHC RBC AUTO-ENTMCNC: 32.6 G/DL (ref 32–36)
MCV RBC AUTO: 98 FL (ref 82–98)
MICROALBUMIN UR DL<=1MG/L-MCNC: <5 UG/ML
MONOCYTES # BLD AUTO: 0.9 K/UL (ref 0.3–1)
MONOCYTES NFR BLD: 9.5 % (ref 4–15)
NEUTROPHILS # BLD AUTO: 5.7 K/UL (ref 1.8–7.7)
NEUTROPHILS NFR BLD: 59.9 % (ref 38–73)
NONHDLC SERPL-MCNC: 101 MG/DL
NRBC BLD-RTO: 0 /100 WBC
PLATELET # BLD AUTO: 303 K/UL (ref 150–450)
PMV BLD AUTO: 10.9 FL (ref 9.2–12.9)
POTASSIUM SERPL-SCNC: 4.4 MMOL/L (ref 3.5–5.1)
PROT SERPL-MCNC: 8.5 G/DL (ref 6–8.4)
RBC # BLD AUTO: 3.82 M/UL (ref 4–5.4)
SODIUM SERPL-SCNC: 135 MMOL/L (ref 136–145)
TRIGL SERPL-MCNC: 61 MG/DL (ref 30–150)
TSH SERPL DL<=0.005 MIU/L-ACNC: 1.4 UIU/ML (ref 0.4–4)
WBC # BLD AUTO: 9.54 K/UL (ref 3.9–12.7)

## 2024-01-24 NOTE — TELEPHONE ENCOUNTER
PA Case: [id number], Status: Approved, Coverage Starts on: 1/1/2024 12:00:00 AM, Coverage Ends on: 12/31/2024 12:00:00 AM

## 2024-01-30 ENCOUNTER — TELEPHONE (OUTPATIENT)
Dept: FAMILY MEDICINE | Facility: CLINIC | Age: 84
End: 2024-01-30
Payer: MEDICARE

## 2024-01-30 PROBLEM — I49.5 SINUS NODE DYSFUNCTION: Status: ACTIVE | Noted: 2024-01-30

## 2024-01-30 NOTE — TELEPHONE ENCOUNTER
----- Message from Dilma Chan MD sent at 1/30/2024  3:18 AM CST -----  Tell patient lab results are within acceptable range except decreased kidney function. Try to avoid taking NSAID's (Advil, Ibuprofen, Motrin, Aleve) as they may cause further decline in kidney function. Continue current medications. Thanks.

## 2024-03-25 RX ORDER — ISOPROPYL ALCOHOL 70 ML/100ML
SWAB TOPICAL
Refills: 0 | OUTPATIENT
Start: 2024-03-25

## 2024-03-25 RX ORDER — LANCETS
EACH MISCELLANEOUS
Qty: 100 EACH | Refills: 0 | OUTPATIENT
Start: 2024-03-25

## 2024-03-25 RX ORDER — DEXTROSE 4 G
TABLET,CHEWABLE ORAL
Refills: 0 | OUTPATIENT
Start: 2024-03-25

## 2024-03-25 NOTE — TELEPHONE ENCOUNTER
Refill Decision Note   Danyelle Garcia  is requesting a refill authorization.  Brief Assessment and Rationale for Refill:  Quick Discontinue     Medication Therapy Plan:    Pharmacy is requesting new scripts for the following medications without required information, (sig/ frequency/qty/etc)      Medication Reconciliation Completed: No     Comments: Pharmacies have been requesting medications for patients without required information, (sig, frequency, qty, etc.). In addition, requests are sent for medication(s) pt. are currently not taking, and medications patients have never taken.    We have spoken to the pharmacies about these request types and advised their teams previously that we are unable to assess these New Script requests and require all details for these requests. This is a known issue and has been reported.     Note composed:12:22 PM 03/25/2024

## 2024-03-25 NOTE — TELEPHONE ENCOUNTER
Care Due:                  Date            Visit Type   Department     Provider  --------------------------------------------------------------------------------                                EP -                              PRIMARY      [facility]  Last Visit: 01-      CARE ([facility])   MEDICINE       Dilma Chan  Next Visit: None Scheduled  None         None Found                                                            Last  Test          Frequency    Reason                     Performed    Due Date  --------------------------------------------------------------------------------    Vitamin D...  12 months..  ergocalciferol...........  Not Found    Overdue    [employer] Embedded Care Due Messages. Reference number: [id number].   3/25/2024 9:54:13 AM CDT

## 2024-05-02 LAB
LEFT EYE DM RETINOPATHY: NEGATIVE
RIGHT EYE DM RETINOPATHY: NEGATIVE

## 2024-05-08 ENCOUNTER — PATIENT OUTREACH (OUTPATIENT)
Dept: ADMINISTRATIVE | Facility: HOSPITAL | Age: 84
End: 2024-05-08
Payer: MEDICARE

## 2024-05-23 ENCOUNTER — OFFICE VISIT (OUTPATIENT)
Dept: FAMILY MEDICINE | Facility: CLINIC | Age: 84
End: 2024-05-23
Payer: MEDICARE

## 2024-05-23 VITALS
HEIGHT: 64 IN | TEMPERATURE: 99 F | DIASTOLIC BLOOD PRESSURE: 62 MMHG | SYSTOLIC BLOOD PRESSURE: 110 MMHG | RESPIRATION RATE: 18 BRPM | OXYGEN SATURATION: 98 % | HEART RATE: 72 BPM | BODY MASS INDEX: 23.6 KG/M2 | WEIGHT: 138.25 LBS

## 2024-05-23 DIAGNOSIS — R07.0 THROAT PAIN: ICD-10-CM

## 2024-05-23 DIAGNOSIS — R05.9 COUGH, UNSPECIFIED TYPE: Primary | ICD-10-CM

## 2024-05-23 PROCEDURE — 1159F MED LIST DOCD IN RCRD: CPT | Mod: HCNC,CPTII,S$GLB, | Performed by: NURSE PRACTITIONER

## 2024-05-23 PROCEDURE — 3288F FALL RISK ASSESSMENT DOCD: CPT | Mod: HCNC,CPTII,S$GLB, | Performed by: NURSE PRACTITIONER

## 2024-05-23 PROCEDURE — 99214 OFFICE O/P EST MOD 30 MIN: CPT | Mod: HCNC,S$GLB,, | Performed by: NURSE PRACTITIONER

## 2024-05-23 PROCEDURE — 3074F SYST BP LT 130 MM HG: CPT | Mod: HCNC,CPTII,S$GLB, | Performed by: NURSE PRACTITIONER

## 2024-05-23 PROCEDURE — 3078F DIAST BP <80 MM HG: CPT | Mod: HCNC,CPTII,S$GLB, | Performed by: NURSE PRACTITIONER

## 2024-05-23 PROCEDURE — 99999 PR PBB SHADOW E&M-EST. PATIENT-LVL IV: CPT | Mod: PBBFAC,HCNC,, | Performed by: NURSE PRACTITIONER

## 2024-05-23 PROCEDURE — 1160F RVW MEDS BY RX/DR IN RCRD: CPT | Mod: HCNC,CPTII,S$GLB, | Performed by: NURSE PRACTITIONER

## 2024-05-23 PROCEDURE — 2023F DILAT RTA XM W/O RTNOPTHY: CPT | Mod: HCNC,CPTII,S$GLB, | Performed by: NURSE PRACTITIONER

## 2024-05-23 PROCEDURE — 1101F PT FALLS ASSESS-DOCD LE1/YR: CPT | Mod: HCNC,CPTII,S$GLB, | Performed by: NURSE PRACTITIONER

## 2024-05-23 RX ORDER — PROMETHAZINE HYDROCHLORIDE AND DEXTROMETHORPHAN HYDROBROMIDE 6.25; 15 MG/5ML; MG/5ML
10 SYRUP ORAL EVERY 4 HOURS PRN
Qty: 240 ML | Refills: 0 | Status: SHIPPED | OUTPATIENT
Start: 2024-05-23 | End: 2024-06-02

## 2024-05-23 RX ORDER — AMOXICILLIN 875 MG/1
875 TABLET, FILM COATED ORAL 2 TIMES DAILY
Qty: 20 TABLET | Refills: 0 | Status: SHIPPED | OUTPATIENT
Start: 2024-05-23 | End: 2024-06-02

## 2024-05-23 RX ORDER — LEVOCETIRIZINE DIHYDROCHLORIDE 5 MG/1
5 TABLET, FILM COATED ORAL NIGHTLY
Qty: 30 TABLET | Refills: 11 | Status: SHIPPED | OUTPATIENT
Start: 2024-05-23 | End: 2025-05-23

## 2024-05-23 NOTE — PROGRESS NOTES
Danyelle Garcia  05/23/2024  [id number]    Dilma Chan MD  Patient Care Team:  Dilma Chan MD as PCP - General (Family Medicine)  Kevin Olson LPN as Care Coordinator  Joshua Giron OD (Optometry)          Visit Type:an urgent visit for a new problem    Chief Complaint:  Chief Complaint   Patient presents with    Cough       History of Present Illness:    82 yo female presents today with co continued congested cough for a month. Reports PND and throat irritation.  She is requesting refill of promethazine.  Denies fever, SOB or CP    History:  Past Medical History:   Diagnosis Date    Breast cancer 2006    left breast treated with lumpectomy, radiation, and chemo    CAD (coronary artery disease)     Colon polyp     Congestive heart failure 11/23/2021    Congestive heart failure     Diabetes mellitus, type 2     Diverticular disease     Dizziness     External hemorrhoid     H. pylori infection     Headache     Hyperlipidemia     Hypertension     Insomnia     Osteopenia     Postmenopausal     Tobacco use     Vitamin D deficiency disease      Past Surgical History:   Procedure Laterality Date    BREAST LUMPECTOMY Left     CATARACT EXTRACTION      CHOLECYSTECTOMY      COLONOSCOPY      COLONOSCOPY N/A 7/7/2020    Procedure: COLONOSCOPY rapid covid-19;  Surgeon: Sean Paz MD;  Location: [facility];  Service: Endoscopy;  Laterality: N/A;    EYE SURGERY Right     cataract    HYSTERECTOMY      left lumpectomy      TOTAL ABDOMINAL HYSTERECTOMY W/ BILATERAL SALPINGOOPHORECTOMY      Due to benign reasons per patient    UPPER GASTROINTESTINAL ENDOSCOPY       Family History   Problem Relation Name Age of Onset    Hypertension Mother      Heart attack Father      Heart disease Father          MI/CAD    Diabetes Daughter      Diabetes Son      No Known Problems Daughter      No Known Problems Son      Cancer Neg Hx      Stroke Neg Hx       Social History     Socioeconomic History    Marital  status:     Number of children: 4   Occupational History    Occupation: Retired   Tobacco Use    Smoking status: Light Smoker     Current packs/day: 0.30     Average packs/day: 0.3 packs/day for 68.4 years (20.5 ttl pk-yrs)     Types: Cigarettes     Start date: 1956    Smokeless tobacco: Never   Substance and Sexual Activity    Alcohol use: Yes     Comment: 3 beers per month    Drug use: No    Sexual activity: Not Currently     Birth control/protection: Post-menopausal   Social History Narrative    She wears seatbelt. She lives alone and does not currently drive.      Social Determinants of Health     Financial Resource Strain: Low Risk  (10/3/2023)    Overall Financial Resource Strain (CARDIA)     Difficulty of Paying Living Expenses: Not hard at all   Food Insecurity: No Food Insecurity (10/3/2023)    Hunger Vital Sign     Worried About Running Out of Food in the Last Year: Never true     Ran Out of Food in the Last Year: Never true   Transportation Needs: No Transportation Needs (10/3/2023)    PRAPARE - Transportation     Lack of Transportation (Medical): No     Lack of Transportation (Non-Medical): No   Physical Activity: Inactive (1/18/2024)    Exercise Vital Sign     Days of Exercise per Week: 0 days     Minutes of Exercise per Session: 0 min   Stress: No Stress Concern Present (10/3/2023)    [employer] [place] [employer] - Occupational Stress Questionnaire     Feeling of Stress : Not at all   Housing Stability: Low Risk  (10/3/2023)    Housing Stability Vital Sign     Unable to Pay for Housing in the Last Year: No     Number of Places Lived in the Last Year: 1     Unstable Housing in the Last Year: No     Patient Active Problem List   Diagnosis    Tobacco use disorder    Disorder of bone and cartilage    Asymptomatic postmenopausal status    CAD (coronary artery disease)    Vitamin D deficiency    PAD (peripheral artery disease)    Right shoulder pain    Hypertension associated with  diabetes    Combined hyperlipidemia associated with type 2 diabetes mellitus    Epigastric pain    History of left breast cancer    Gastroesophageal reflux disease    Diabetes mellitus with neurological manifestations, controlled    Type 2 diabetes mellitus with peripheral artery disease    Carotid artery disease    Memory deficit    Type 2 diabetes mellitus without retinopathy    Serum calcium elevated    Right thigh pain    Benign colon polyp    Congestive heart failure    Amyloid heart disease    Overweight (BMI 25.0-29.9)    BRAYAN (generalized anxiety disorder)    Sinus node dysfunction     Review of patient's allergies indicates:  No Known Allergies    The following were reviewed at this visit: active problem list, medication list, allergies, family history, social history, and health maintenance.    Medications:  Current Outpatient Medications on File Prior to Visit   Medication Sig Dispense Refill    alcohol swabs PadM Apply 1 each topically as needed. 100 each 11    aspirin (ECOTRIN) 81 MG EC tablet Take 81 mg by mouth daily as needed for Pain.      atorvastatin (LIPITOR) 80 MG tablet TAKE 1 TABLET(80 MG) BY MOUTH EVERY EVENING 90 tablet 1    empagliflozin (JARDIANCE) 10 mg tablet Take 10 mg by mouth once daily.      ergocalciferol (ERGOCALCIFEROL) 50,000 unit Cap Take 1 capsule (50,000 Units total) by mouth every 14 (fourteen) days. 6 capsule 1    furosemide (LASIX) 40 MG tablet Take 40 mg by mouth once daily.      GABAPENTIN ORAL Take 100 mg by mouth 3 (three) times daily.      levocetirizine (XYZAL) 5 MG tablet Take 1 tablet (5 mg total) by mouth every evening. 30 tablet 11    memantine (NAMENDA) 5 MG Tab TAKE 1 TABLET(5 MG) BY MOUTH TWICE DAILY 180 tablet 1    naproxen sodium (ALEVE) 220 mg Cap Take 2 capsules by mouth daily as needed (headache).      sacubitriL-valsartan (ENTRESTO) 24-26 mg per tablet Take 1 tablet by mouth 2 (two) times daily.      TRUE METRIX GLUCOSE TEST STRIP Strp TEST TWICE DAILY 200  strip 3    TRUEPLUS LANCETS 28 gauge Misc USE TO TEST BLOOD SUGAR TWICE DAILY 100 each 0    ULTRA THIN LANCETS 30 gauge Misc USE TWICE DAILY 200 each 3    blood glucose control, normal (ACCU-CHEK SMARTVIEW CONTRL SOL) Soln 1 each by Misc.(Non-Drug; Combo Route) route 2 (two) times daily. 1 each 0    blood-glucose meter kit Use as instructed 1 each 0    spironolactone (ALDACTONE) 25 MG tablet Take 25 mg by mouth.       No current facility-administered medications on file prior to visit.       Medications have been reviewed and reconciled with patient at this visit.  Barriers to medications reviewed with patient.    Adverse reactions to current medications reviewed with patient..    Over the counter medications reviewed and reconciled with patient.    Exam:  Wt Readings from Last 3 Encounters:   05/23/24 62.7 kg (138 lb 3.7 oz)   01/18/24 66 kg (145 lb 8.1 oz)   11/15/23 64 kg (141 lb 1.5 oz)     Temp Readings from Last 3 Encounters:   05/23/24 99 °F (37.2 °C) (Tympanic)   01/18/24 98.5 °F (36.9 °C) (Tympanic)   11/15/23 97.1 °F (36.2 °C) (Tympanic)     BP Readings from Last 3 Encounters:   05/23/24 110/62   01/18/24 110/66   11/15/23 100/60     Pulse Readings from Last 3 Encounters:   05/23/24 72   01/18/24 76   11/15/23 90     Body mass index is 23.73 kg/m².      Review of Systems   Constitutional:  Negative for fever.   HENT:  Positive for congestion.    Respiratory:  Positive for cough. Negative for shortness of breath and wheezing.    Cardiovascular:  Negative for chest pain and palpitations.   Gastrointestinal:  Negative for nausea.   Neurological:  Negative for speech change, weakness and headaches.   All other systems reviewed and are negative.    Physical Exam  Vitals and nursing note reviewed.   Constitutional:       Appearance: Normal appearance. She is obese.   HENT:      Head: Normocephalic and atraumatic.      Right Ear: Tympanic membrane, ear canal and external ear normal.      Left Ear: Tympanic  membrane, ear canal and external ear normal.      Nose: Nose normal.      Mouth/Throat:      Mouth: Mucous membranes are moist.      Pharynx: Oropharynx is clear.   Eyes:      Extraocular Movements: Extraocular movements intact.      Conjunctiva/sclera: Conjunctivae normal.      Pupils: Pupils are equal, round, and reactive to light.   Cardiovascular:      Rate and Rhythm: Normal rate and regular rhythm.      Pulses: Normal pulses.      Heart sounds: Normal heart sounds.   Pulmonary:      Effort: Pulmonary effort is normal.      Breath sounds: Normal breath sounds.      Comments: Congested cough heard throughout exam   Abdominal:      General: Bowel sounds are normal.      Palpations: Abdomen is soft.   Musculoskeletal:         General: Normal range of motion.      Cervical back: Normal range of motion and neck supple.   Skin:     General: Skin is warm and dry.      Capillary Refill: Capillary refill takes less than 2 seconds.   Neurological:      General: No focal deficit present.      Mental Status: She is alert and oriented to person, place, and time.   Psychiatric:         Mood and Affect: Mood normal.         Behavior: Behavior normal.         Thought Content: Thought content normal.         Judgment: Judgment normal.         Laboratory Reviewed ({Yes)  Lab Results   Component Value Date    WBC 9.54 01/18/2024    HGB 12.2 01/18/2024    HCT 37.4 01/18/2024     01/18/2024    CHOL 155 01/18/2024    TRIG 61 01/18/2024    HDL 54 01/18/2024    ALT 18 01/18/2024    AST 17 01/18/2024     (L) 01/18/2024    K 4.4 01/18/2024     01/18/2024    CREATININE 1.2 01/18/2024    BUN 30 (H) 01/18/2024    CO2 23 01/18/2024    TSH 1.395 01/18/2024    INR 1.1 11/27/2023    HGBA1C 6.2 (H) 01/18/2024       There are no diagnoses linked to this encounter.    Plan  Start medications prescribed today         Care Plan/Goals: Reviewed    Goals    None     Danyelle was seen today for cough.    Diagnoses and all orders for  this visit:    Cough, unspecified type  -     promethazine-dextromethorphan (PROMETHAZINE-DM) 6.25-15 mg/5 mL Syrp; Take 10 mLs by mouth every 4 (four) hours as needed.    Throat pain  -     levocetirizine (XYZAL) 5 MG tablet; Take 1 tablet (5 mg total) by mouth every evening.  -     amoxicillin (AMOXIL) 875 MG tablet; Take 1 tablet (875 mg total) by mouth 2 (two) times daily. for 10 days         Follow up: Follow up for with  for 6 month follow up.    After visit summary was printed and given to patient upon discharge today.  Patient goals and care plan are included in After Visit Summary.

## 2024-06-04 DIAGNOSIS — E55.9 VITAMIN D DEFICIENCY: ICD-10-CM

## 2024-06-04 RX ORDER — ERGOCALCIFEROL 1.25 MG/1
CAPSULE ORAL
Qty: 6 CAPSULE | Refills: 1 | Status: SHIPPED | OUTPATIENT
Start: 2024-06-04

## 2024-06-04 NOTE — TELEPHONE ENCOUNTER
Care Due:                  Date            Visit Type   Department     Provider  --------------------------------------------------------------------------------                                EP -                              PRIMARY      [facility]  Last Visit: 01-      CARE ([facility])   MEDICINE       Dilma Chan  Next Visit: None Scheduled  None         None Found                                                            Last  Test          Frequency    Reason                     Performed    Due Date  --------------------------------------------------------------------------------    Vitamin D...  12 months..  ergocalciferol...........  Not Found    Overdue    [employer] Embedded Care Due Messages. Reference number: [id number].   6/04/2024 10:57:56 AM CDT

## 2024-06-07 DIAGNOSIS — E11.69 COMBINED HYPERLIPIDEMIA ASSOCIATED WITH TYPE 2 DIABETES MELLITUS: ICD-10-CM

## 2024-06-07 DIAGNOSIS — E78.2 COMBINED HYPERLIPIDEMIA ASSOCIATED WITH TYPE 2 DIABETES MELLITUS: ICD-10-CM

## 2024-06-07 RX ORDER — ATORVASTATIN CALCIUM 80 MG/1
80 TABLET, FILM COATED ORAL NIGHTLY
Qty: 90 TABLET | Refills: 2 | Status: SHIPPED | OUTPATIENT
Start: 2024-06-07

## 2024-06-07 NOTE — TELEPHONE ENCOUNTER
Refill Decision Note   Danyelle Radha  is requesting a refill authorization.  Brief Assessment and Rationale for Refill:  Approve     Medication Therapy Plan:         Comments:     Note composed:6:40 AM 06/07/2024

## 2024-06-07 NOTE — TELEPHONE ENCOUNTER
No care due was identified.  [facility] Embedded Care Due Messages. Reference number: [id number].   6/07/2024 5:42:16 AM CDT

## 2024-07-07 DIAGNOSIS — R41.3 MEMORY DEFICIT: ICD-10-CM

## 2024-07-08 RX ORDER — MEMANTINE HYDROCHLORIDE 5 MG/1
TABLET ORAL
Qty: 180 TABLET | Refills: 1 | Status: SHIPPED | OUTPATIENT
Start: 2024-07-08

## 2024-11-19 ENCOUNTER — TELEPHONE (OUTPATIENT)
Dept: FAMILY MEDICINE | Facility: CLINIC | Age: 84
End: 2024-11-19
Payer: MEDICARE

## 2024-11-21 NOTE — TELEPHONE ENCOUNTER
----- Message from Kely sent at 11/20/2024  1:54 PM CST -----  Name of Caller:.Danyelle Smith Garcia   Best Call Back Number:.[phone]    Additional Information: Patient is requesting a call back regarding vitamin D and the cost increase. She stated the nurse was suppose to get back in touch with her. Please call the patient back to advise.  
----- Message from Lanette sent at 11/19/2024  2:36 PM CST -----  Contact: [phone]  .[email or web address]     Patient is calling for Medical Advice regarding:ergocalciferol (ERGOCALCIFEROL) 50,000 unit Cap    How long has patient had these symptoms:wants to speak to office     Pharmacy name and phone#:  [id number] - [place] - [address] AT [facility]  [address] [address]  Phone: [phone] Fax: [phone]       Patient wants a call back or thru [facility]: call back     Comments:  Pt is wanting to speak to someone in regards to the cost they are telling her for her vitamins please adbise   Please advise patient replies from provider may take up to 48 hours.  
Advise pt can instead take OTC Vitamin D3 - 4000 units (2-2000 unit pills) daily due to cost.  
Left message to call clinic back.    
Patient called in regarding phone conversation from 11/19 about the cost of her Vitamin D. Pt states she was told someone would reach out to her. Advised pt that message was sent to PCP after phone call on yesterday and that we will reach back out to her with Dr. Chan's response.   
Patient called in stating her Vitamin D prescription has gone up. She stated it's now 19 dollars (6 pills). She is wanting to know if there is anything else you would recommend her to do because she can not afford to pay for the new prescription which is 19 dollars.   
Yes

## 2024-12-02 ENCOUNTER — OFFICE VISIT (OUTPATIENT)
Dept: HOME HEALTH SERVICES | Facility: CLINIC | Age: 84
End: 2024-12-02
Payer: MEDICARE

## 2024-12-02 VITALS
RESPIRATION RATE: 18 BRPM | DIASTOLIC BLOOD PRESSURE: 50 MMHG | WEIGHT: 138 LBS | TEMPERATURE: 97 F | SYSTOLIC BLOOD PRESSURE: 118 MMHG | BODY MASS INDEX: 23.56 KG/M2 | OXYGEN SATURATION: 97 % | HEIGHT: 64 IN | HEART RATE: 85 BPM

## 2024-12-02 DIAGNOSIS — Z99.89 DEPENDENCE ON OTHER ENABLING MACHINES AND DEVICES: ICD-10-CM

## 2024-12-02 DIAGNOSIS — I49.5 SINUS NODE DYSFUNCTION: ICD-10-CM

## 2024-12-02 DIAGNOSIS — I50.9 CONGESTIVE HEART FAILURE, UNSPECIFIED HF CHRONICITY, UNSPECIFIED HEART FAILURE TYPE: ICD-10-CM

## 2024-12-02 DIAGNOSIS — F17.200 TOBACCO USE DISORDER: ICD-10-CM

## 2024-12-02 DIAGNOSIS — R63.4 WEIGHT LOSS, ABNORMAL: ICD-10-CM

## 2024-12-02 DIAGNOSIS — K63.5 BENIGN COLON POLYP: ICD-10-CM

## 2024-12-02 DIAGNOSIS — I77.9 CAROTID ARTERY DISEASE, UNSPECIFIED LATERALITY, UNSPECIFIED TYPE: ICD-10-CM

## 2024-12-02 DIAGNOSIS — R41.3 MEMORY DEFICIT: Primary | ICD-10-CM

## 2024-12-02 DIAGNOSIS — I25.10 CORONARY ARTERY DISEASE INVOLVING NATIVE CORONARY ARTERY OF NATIVE HEART WITHOUT ANGINA PECTORIS: Chronic | ICD-10-CM

## 2024-12-02 DIAGNOSIS — E11.51 TYPE 2 DIABETES MELLITUS WITH PERIPHERAL ARTERY DISEASE: ICD-10-CM

## 2024-12-02 DIAGNOSIS — E11.69 COMBINED HYPERLIPIDEMIA ASSOCIATED WITH TYPE 2 DIABETES MELLITUS: ICD-10-CM

## 2024-12-02 DIAGNOSIS — E78.2 COMBINED HYPERLIPIDEMIA ASSOCIATED WITH TYPE 2 DIABETES MELLITUS: ICD-10-CM

## 2024-12-02 DIAGNOSIS — I15.2 HYPERTENSION ASSOCIATED WITH DIABETES: ICD-10-CM

## 2024-12-02 DIAGNOSIS — E85.4 AMYLOID HEART DISEASE: ICD-10-CM

## 2024-12-02 DIAGNOSIS — F41.1 GAD (GENERALIZED ANXIETY DISORDER): ICD-10-CM

## 2024-12-02 DIAGNOSIS — Z85.3 HISTORY OF LEFT BREAST CANCER: ICD-10-CM

## 2024-12-02 DIAGNOSIS — I43 AMYLOID HEART DISEASE: ICD-10-CM

## 2024-12-02 DIAGNOSIS — E55.9 VITAMIN D DEFICIENCY: ICD-10-CM

## 2024-12-02 DIAGNOSIS — E11.59 HYPERTENSION ASSOCIATED WITH DIABETES: ICD-10-CM

## 2024-12-02 DIAGNOSIS — Z00.00 ENCOUNTER FOR PREVENTIVE HEALTH EXAMINATION: ICD-10-CM

## 2024-12-02 PROBLEM — F03.90 DEMENTIA, UNSPECIFIED DEMENTIA SEVERITY, UNSPECIFIED DEMENTIA TYPE, UNSPECIFIED WHETHER BEHAVIORAL, PSYCHOTIC, OR MOOD DISTURBANCE OR ANXIETY: Status: ACTIVE | Noted: 2024-12-02

## 2024-12-02 PROBLEM — E83.52 SERUM CALCIUM ELEVATED: Status: RESOLVED | Noted: 2018-03-19 | Resolved: 2024-12-02

## 2024-12-02 PROBLEM — E66.3 OVERWEIGHT (BMI 25.0-29.9): Status: RESOLVED | Noted: 2022-01-30 | Resolved: 2024-12-02

## 2024-12-02 PROBLEM — M79.651 RIGHT THIGH PAIN: Status: RESOLVED | Noted: 2019-01-02 | Resolved: 2024-12-02

## 2024-12-02 PROBLEM — E11.9 TYPE 2 DIABETES MELLITUS WITHOUT RETINOPATHY: Status: RESOLVED | Noted: 2017-07-19 | Resolved: 2024-12-02

## 2024-12-02 PROCEDURE — 1126F AMNT PAIN NOTED NONE PRSNT: CPT | Mod: CPTII,S$GLB,, | Performed by: NURSE PRACTITIONER

## 2024-12-02 PROCEDURE — 1159F MED LIST DOCD IN RCRD: CPT | Mod: CPTII,S$GLB,, | Performed by: NURSE PRACTITIONER

## 2024-12-02 PROCEDURE — 3074F SYST BP LT 130 MM HG: CPT | Mod: CPTII,S$GLB,, | Performed by: NURSE PRACTITIONER

## 2024-12-02 PROCEDURE — 1124F ACP DISCUSS-NO DSCNMKR DOCD: CPT | Mod: CPTII,S$GLB,, | Performed by: NURSE PRACTITIONER

## 2024-12-02 PROCEDURE — 3078F DIAST BP <80 MM HG: CPT | Mod: CPTII,S$GLB,, | Performed by: NURSE PRACTITIONER

## 2024-12-02 PROCEDURE — 1100F PTFALLS ASSESS-DOCD GE2>/YR: CPT | Mod: CPTII,S$GLB,, | Performed by: NURSE PRACTITIONER

## 2024-12-02 PROCEDURE — 1160F RVW MEDS BY RX/DR IN RCRD: CPT | Mod: CPTII,S$GLB,, | Performed by: NURSE PRACTITIONER

## 2024-12-02 PROCEDURE — G0439 PPPS, SUBSEQ VISIT: HCPCS | Mod: S$GLB,,, | Performed by: NURSE PRACTITIONER

## 2024-12-02 PROCEDURE — G9919 SCRN ND POS ND PROV OF REC: HCPCS | Mod: CPTII,S$GLB,, | Performed by: NURSE PRACTITIONER

## 2024-12-02 PROCEDURE — 3288F FALL RISK ASSESSMENT DOCD: CPT | Mod: CPTII,S$GLB,, | Performed by: NURSE PRACTITIONER

## 2024-12-02 PROCEDURE — 1170F FXNL STATUS ASSESSED: CPT | Mod: CPTII,S$GLB,, | Performed by: NURSE PRACTITIONER

## 2024-12-02 RX ORDER — BACLOFEN 5 MG/1
5 TABLET ORAL 3 TIMES DAILY
COMMUNITY
Start: 2024-08-10

## 2024-12-02 RX ORDER — HYDROCODONE BITARTRATE AND ACETAMINOPHEN 10; 325 MG/1; MG/1
1 TABLET ORAL EVERY 8 HOURS PRN
COMMUNITY
Start: 2024-11-05

## 2024-12-02 RX ORDER — ERGOCALCIFEROL 1.25 MG/1
50000 CAPSULE ORAL
Qty: 2 CAPSULE | Refills: 0 | Status: SHIPPED | OUTPATIENT
Start: 2024-12-02 | End: 2025-01-01

## 2024-12-02 NOTE — PROGRESS NOTES
"  Danyelle Garcia presented for an initial Medicare AWV today. The following components were reviewed and updated:    Medical history  Family History  Social history  Allergies and Current Medications  Health Risk Assessment  Health Maintenance  Care Team    See Completed Assessments for Annual Wellness visit with in the encounter summary    The following assessments were completed:  Depression Screening  Cognitive function Screening  Timed Get Up Test  Whisper Test      Opioid documentation:      Patient does have a current opioid prescription.      Patient not capable of further discussion regarding opioid medication use.           Vitals:    12/02/24 1055   BP: (!) 118/50   Pulse: 85   Resp: 18   Temp: 97.3 °F (36.3 °C)   SpO2: 97%   Weight: 62.6 kg (138 lb)   Height: 5' 4" (1.626 m)     Body mass index is 23.69 kg/m².       Physical Exam  Vitals reviewed.   Constitutional:       General: She is not in acute distress.     Appearance: She is not ill-appearing.   HENT:      Head: Normocephalic and atraumatic.      Nose: Nose normal.      Mouth/Throat:      Mouth: Mucous membranes are moist.      Pharynx: Oropharynx is clear.   Cardiovascular:      Rate and Rhythm: Normal rate and regular rhythm.      Pulses: Normal pulses.   Pulmonary:      Effort: Pulmonary effort is normal.      Breath sounds: Normal breath sounds.   Abdominal:      General: Bowel sounds are normal.      Palpations: Abdomen is soft.   Musculoskeletal:         General: Normal range of motion.      Cervical back: Neck supple.   Skin:     General: Skin is warm and dry.         Diagnoses and health risks identified today and associated recommendations/orders:  1. Vitamin D deficiency  Refilled medication for 1 month until able to get to PCP  - ergocalciferol (ERGOCALCIFEROL) 50,000 unit Cap; Take 1 capsule (50,000 Units total) by mouth every 14 (fourteen) days.  Dispense: 2 capsule; Refill: 0    2. Memory deficit  Chronic and stable. Continue current " management with memantine 10 mg PO QD. Patient scored 5/5 on cognitive part of AWV. Follow up with PCP as instructed.     3. BRAYAN (generalized anxiety disorder)  Chronic and stable. Patient reports that she is not on any medication at this time. Follow up with PCP as instructed.     4. Dependence on other enabling machines and devices  Patient uses a cane for ambulation    5. Weight loss, abnormal  In 10/2023 patient weight was 151 lbs and last weight was 138 lbs in 5/2024. Patient did not have a scale to weigh herself today    6. Encounter for preventive health examination  completed    7. Sinus node dysfunction  Chronic and stable. Patient has a pacemaker to the right upper chest wall. Follow up with Cardiology as instructed.     8. Hypertension associated with diabetes  Chronic and stable. Continue to monitor.  Follow up with PCP/Cardiology as instructed. BP on visit 118/50    9. Congestive heart failure, unspecified HF chronicity, unspecified heart failure type  Chronic and stable. Continue current management with sacubitril-valsartan 24-26 mg PO BID and spironolactone 25mg PO QD.  Follow up with Cardiology as instructed.     10. Combined hyperlipidemia associated with type 2 diabetes mellitus  Chronic and stable. Continue current management with atorvastatin 80 mg PO QD. Follow up with PCP as instructed.   Component      Latest Ref Rng 2/16/2023 1/18/2024   Cholesterol Total      120 - 199 mg/dL 179  155    Triglycerides      30 - 150 mg/dL 79  61    HDL      40 - 75 mg/dL 62  54    LDL Cholesterol      63.0 - 159.0 mg/dL 101.2  88.8    HDL/Cholesterol Ratio      20.0 - 50.0 % 34.6  34.8    Total Cholesterol/HDL Ratio      2.0 - 5.0  2.9  2.9    Non-HDL Cholesterol      mg/dL 117  101      11. Carotid artery disease, unspecified laterality, unspecified type  Chronic and stable. Continue to monitor.  Follow up with Cardiology as instructed. Last Carotid U/S was 6/2024:   Conclusions: Right internal carotid artery  velocities are consistent with 40-59% stenosis.    Left internal carotid artery velocities are consistent with 60-79% stenosis.   No change since previous exam done 2/23/24     12. Coronary artery disease involving native coronary artery of native heart without angina pectoris  Chronic and stable. Continue current management with atorvastatin 80 mg PO QD and aspirin 81 mg PO QD. Follow up with Cardiology as instructed.     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 Continue current management with spironolactone 25mg PO QD. Follow up with Cardiology asinstructed.     14. History of left breast cancer  Chronic and stable. Continue current management. See med list above. Follow up with Oncology as instructed.   n 1/06 was diagnosed as having cancer of the left breast and underwent  lumpectomy along with sentinel lymph node biopsy. The pathology report from  [facility] indicated that the   patient had an invasive ductal carcinoma of the breast measuring 1.5 cm in   diameter.   The ER receptors and the HER-2 kris tests were negative.   She received 4 cycles of adjuvant AC which she tolerated well.  She had radiation therapy.      15. Benign colon polyp  Chronic and stable. Continue to monitor. Follow up with GI as instructed. Last colonoscopy was on 7/2020. Next due 7/2025  Impression:             - One 3 mm polyp in the descending colon, removed  with a cold snare. Resected and retrieved.                     - One 2 mm polyp in the sigmoid colon, removed with    a cold snare. Resected and retrieved. Diverticulosis in the sigmoid colon, in the descending colon and in the transverse colon. There was no evidence of diverticular bleeding. The examination was otherwise normal on direct and retroflexion views.     16. Type 2 diabetes mellitus with peripheral artery disease  Chronic and stable. Continue current management with empagliflozin 10 mg PO QD.  Follow up with PCP as  instructed.   Component      Latest Ref Rng 1/26/2022 8/26/2022 2/16/2023 1/18/2024   Hemoglobin A1C External      4.0 - 5.6 % 7.2 (H)  6.2 (H)  5.9 (H)  6.2 (H)    Estimated Avg Glucose      68 - 131 mg/dL 160 (H)  131  123  131      17. Tobacco use disorder  Chronic. Patient states that she is not ready to quit. Follow up with PCP as instructed.       Provided Danyelle with a 5-10 year written screening schedule and personal prevention plan. Recommendations were developed using the USPSTF age appropriate recommendations. Education, counseling, and referrals were provided as needed.  After Visit Summary printed and given to patient which includes a list of additional screenings\tests needed.    No follow-ups on file.      Tahmina Rosas NP  I offered to discuss advanced care planning, including how to pick a person who would make decisions for you if you were unable to make them for yourself, called a health care power of , and what kind of decisions you might make such as use of life sustaining treatments such as ventilators and tube feeding when faced with a life limiting illness recorded on a living will that they will need to know. (How you want to be cared for as you near the end of your natural life)     X  Patient is unwilling to engage in a discussion regarding advance directives at this time.

## 2025-01-03 DIAGNOSIS — R41.3 MEMORY DEFICIT: ICD-10-CM

## 2025-01-03 NOTE — TELEPHONE ENCOUNTER
Refill Routing Note   Medication(s) are not appropriate for processing by [facility] for the following reason(s):        Outside of protocol    [facility] action(s):  Route               Appointments  past 12m or future 3m with PCP    Date Provider   Last Visit   1/18/2024 Dilma Chan MD   Next Visit   Visit date not found Dilma Chan MD   ED visits in past 90 days: 0        Note composed:10:18 AM 01/03/2025

## 2025-01-05 RX ORDER — MEMANTINE HYDROCHLORIDE 5 MG/1
TABLET ORAL
Qty: 180 TABLET | Refills: 1 | Status: SHIPPED | OUTPATIENT
Start: 2025-01-05

## 2025-01-30 ENCOUNTER — LAB VISIT (OUTPATIENT)
Dept: LAB | Facility: HOSPITAL | Age: 85
End: 2025-01-30
Attending: FAMILY MEDICINE
Payer: MEDICARE

## 2025-01-30 ENCOUNTER — OFFICE VISIT (OUTPATIENT)
Dept: FAMILY MEDICINE | Facility: CLINIC | Age: 85
End: 2025-01-30
Attending: FAMILY MEDICINE
Payer: MEDICARE

## 2025-01-30 VITALS
SYSTOLIC BLOOD PRESSURE: 112 MMHG | OXYGEN SATURATION: 97 % | TEMPERATURE: 98 F | HEART RATE: 80 BPM | DIASTOLIC BLOOD PRESSURE: 58 MMHG | BODY MASS INDEX: 22.28 KG/M2 | HEIGHT: 64 IN | WEIGHT: 130.5 LBS

## 2025-01-30 DIAGNOSIS — E11.59 HYPERTENSION ASSOCIATED WITH DIABETES: ICD-10-CM

## 2025-01-30 DIAGNOSIS — I15.2 HYPERTENSION ASSOCIATED WITH DIABETES: ICD-10-CM

## 2025-01-30 DIAGNOSIS — E78.2 COMBINED HYPERLIPIDEMIA ASSOCIATED WITH TYPE 2 DIABETES MELLITUS: ICD-10-CM

## 2025-01-30 DIAGNOSIS — F03.90 DEMENTIA, UNSPECIFIED DEMENTIA SEVERITY, UNSPECIFIED DEMENTIA TYPE, UNSPECIFIED WHETHER BEHAVIORAL, PSYCHOTIC, OR MOOD DISTURBANCE OR ANXIETY: ICD-10-CM

## 2025-01-30 DIAGNOSIS — F17.200 TOBACCO USE DISORDER: ICD-10-CM

## 2025-01-30 DIAGNOSIS — E11.51 TYPE 2 DIABETES MELLITUS WITH PERIPHERAL ARTERY DISEASE: ICD-10-CM

## 2025-01-30 DIAGNOSIS — E11.69 COMBINED HYPERLIPIDEMIA ASSOCIATED WITH TYPE 2 DIABETES MELLITUS: ICD-10-CM

## 2025-01-30 DIAGNOSIS — Z23 NEED FOR VACCINATION: ICD-10-CM

## 2025-01-30 DIAGNOSIS — I43 AMYLOID HEART DISEASE: ICD-10-CM

## 2025-01-30 DIAGNOSIS — Z00.00 ANNUAL PHYSICAL EXAM: Primary | ICD-10-CM

## 2025-01-30 DIAGNOSIS — E85.4 AMYLOID HEART DISEASE: ICD-10-CM

## 2025-01-30 DIAGNOSIS — I50.9 CONGESTIVE HEART FAILURE, UNSPECIFIED HF CHRONICITY, UNSPECIFIED HEART FAILURE TYPE: ICD-10-CM

## 2025-01-30 DIAGNOSIS — Z78.0 POSTMENOPAUSAL: ICD-10-CM

## 2025-01-30 DIAGNOSIS — I49.5 SINUS NODE DYSFUNCTION: ICD-10-CM

## 2025-01-30 DIAGNOSIS — I25.10 CORONARY ARTERY DISEASE INVOLVING NATIVE CORONARY ARTERY OF NATIVE HEART WITHOUT ANGINA PECTORIS: Chronic | ICD-10-CM

## 2025-01-30 DIAGNOSIS — Z91.81 AT RISK FOR FALLING: ICD-10-CM

## 2025-01-30 DIAGNOSIS — Z85.3 HISTORY OF LEFT BREAST CANCER: ICD-10-CM

## 2025-01-30 LAB
ALBUMIN SERPL BCP-MCNC: 3.3 G/DL (ref 3.5–5.2)
ALBUMIN/CREAT UR: 12.5 UG/MG (ref 0–30)
ALP SERPL-CCNC: 147 U/L (ref 40–150)
ALT SERPL W/O P-5'-P-CCNC: 13 U/L (ref 10–44)
ANION GAP SERPL CALC-SCNC: 11 MMOL/L (ref 8–16)
AST SERPL-CCNC: 23 U/L (ref 10–40)
BASOPHILS # BLD AUTO: 0.08 K/UL (ref 0–0.2)
BASOPHILS NFR BLD: 1 % (ref 0–1.9)
BILIRUB SERPL-MCNC: 0.3 MG/DL (ref 0.1–1)
BUN SERPL-MCNC: 21 MG/DL (ref 8–23)
CALCIUM SERPL-MCNC: 9.9 MG/DL (ref 8.7–10.5)
CHLORIDE SERPL-SCNC: 102 MMOL/L (ref 95–110)
CHOLEST SERPL-MCNC: 120 MG/DL (ref 120–199)
CHOLEST/HDLC SERPL: 3.5 {RATIO} (ref 2–5)
CO2 SERPL-SCNC: 21 MMOL/L (ref 23–29)
CREAT SERPL-MCNC: 1.3 MG/DL (ref 0.5–1.4)
CREAT UR-MCNC: 88 MG/DL (ref 15–325)
DIFFERENTIAL METHOD BLD: ABNORMAL
EOSINOPHIL # BLD AUTO: 0.2 K/UL (ref 0–0.5)
EOSINOPHIL NFR BLD: 1.8 % (ref 0–8)
ERYTHROCYTE [DISTWIDTH] IN BLOOD BY AUTOMATED COUNT: 15 % (ref 11.5–14.5)
EST. GFR  (NO RACE VARIABLE): 40.5 ML/MIN/1.73 M^2
ESTIMATED AVG GLUCOSE: 117 MG/DL (ref 68–131)
GLUCOSE SERPL-MCNC: 96 MG/DL (ref 70–110)
HBA1C MFR BLD: 5.7 % (ref 4–5.6)
HCT VFR BLD AUTO: 34.3 % (ref 37–48.5)
HDLC SERPL-MCNC: 34 MG/DL (ref 40–75)
HDLC SERPL: 28.3 % (ref 20–50)
HGB BLD-MCNC: 11.4 G/DL (ref 12–16)
IMM GRANULOCYTES # BLD AUTO: 0.06 K/UL (ref 0–0.04)
IMM GRANULOCYTES NFR BLD AUTO: 0.7 % (ref 0–0.5)
LDLC SERPL CALC-MCNC: 65.8 MG/DL (ref 63–159)
LYMPHOCYTES # BLD AUTO: 2.8 K/UL (ref 1–4.8)
LYMPHOCYTES NFR BLD: 33.6 % (ref 18–48)
MCH RBC QN AUTO: 31.3 PG (ref 27–31)
MCHC RBC AUTO-ENTMCNC: 33.2 G/DL (ref 32–36)
MCV RBC AUTO: 94 FL (ref 82–98)
MICROALBUMIN UR DL<=1MG/L-MCNC: 11 UG/ML
MONOCYTES # BLD AUTO: 0.6 K/UL (ref 0.3–1)
MONOCYTES NFR BLD: 7.4 % (ref 4–15)
NEUTROPHILS # BLD AUTO: 4.6 K/UL (ref 1.8–7.7)
NEUTROPHILS NFR BLD: 55.5 % (ref 38–73)
NONHDLC SERPL-MCNC: 86 MG/DL
NRBC BLD-RTO: 0 /100 WBC
PLATELET # BLD AUTO: 254 K/UL (ref 150–450)
PMV BLD AUTO: 10.9 FL (ref 9.2–12.9)
POTASSIUM SERPL-SCNC: 4.6 MMOL/L (ref 3.5–5.1)
PROT SERPL-MCNC: 8.1 G/DL (ref 6–8.4)
RBC # BLD AUTO: 3.64 M/UL (ref 4–5.4)
SODIUM SERPL-SCNC: 134 MMOL/L (ref 136–145)
TRIGL SERPL-MCNC: 101 MG/DL (ref 30–150)
TSH SERPL DL<=0.005 MIU/L-ACNC: 1.34 UIU/ML (ref 0.4–4)
WBC # BLD AUTO: 8.33 K/UL (ref 3.9–12.7)

## 2025-01-30 PROCEDURE — 83036 HEMOGLOBIN GLYCOSYLATED A1C: CPT | Mod: HCNC | Performed by: FAMILY MEDICINE

## 2025-01-30 PROCEDURE — 80061 LIPID PANEL: CPT | Mod: HCNC | Performed by: FAMILY MEDICINE

## 2025-01-30 PROCEDURE — 85025 COMPLETE CBC W/AUTO DIFF WBC: CPT | Mod: HCNC | Performed by: FAMILY MEDICINE

## 2025-01-30 PROCEDURE — 80053 COMPREHEN METABOLIC PANEL: CPT | Mod: HCNC | Performed by: FAMILY MEDICINE

## 2025-01-30 PROCEDURE — 82043 UR ALBUMIN QUANTITATIVE: CPT | Mod: HCNC | Performed by: FAMILY MEDICINE

## 2025-01-30 PROCEDURE — 36415 COLL VENOUS BLD VENIPUNCTURE: CPT | Mod: HCNC,PO | Performed by: FAMILY MEDICINE

## 2025-01-30 PROCEDURE — 84443 ASSAY THYROID STIM HORMONE: CPT | Mod: HCNC | Performed by: FAMILY MEDICINE

## 2025-01-30 RX ORDER — ASPIRIN 81 MG/1
81 TABLET ORAL DAILY PRN
Qty: 90 TABLET | Refills: 3 | Status: SHIPPED | OUTPATIENT
Start: 2025-01-30

## 2025-01-30 RX ORDER — MELOXICAM 15 MG/1
15 TABLET ORAL DAILY PRN
COMMUNITY

## 2025-01-30 RX ORDER — ERGOCALCIFEROL 1.25 MG/1
50000 CAPSULE ORAL
COMMUNITY

## 2025-01-30 NOTE — PATIENT INSTRUCTIONS
Please call Dr. Chan for your results.    Okay to take over-the-counter Vitamin D3 2000 units daily.    Thanks.

## 2025-01-30 NOTE — PROGRESS NOTES
HISTORY OF PRESENT ILLNESS: Ms. Garcia comes in today non fasting and with taking medications for annual wellness exam. She reports no acute problems today.     END OF LIFE DECISION: She has a living will and does not desire life support.    She states she can not afford prescription vitamin-D; so, she can take over-the-counter vitamin-D.    She states she does not perform home blood pressure or glucose checks.    She states she does not exercise but monitors diet most times.    She states she no longer follows with pain management.    She states she feels weak and can not get around and fell onto her knees twice 3 months ago.  She desires home health.    She saw Dr. Garces, cardiologist, on December 18, 2024 for 6-month surveillance of Chronic systolic CHF (congestive heart failure) and states she has pacemaker - placed approximatly 13 months ago. She saw [facility] Vascular Surgeon Dr. José Miguel Jeff on December 9, 2024 for Bilateral carotid artery stenosis, PAD (peripheral artery disease), hyperlipidemia associated with type 2 diabetes mellitus. She saw Dr. Gray, neurologist on October 24, 2024 for 3-month surveillance of long-term drug therapy, laboratory test result abnormal, carotid artery stenosis, trigeminal neuralgia.    She states she smokes 3 to 4 cigarettes a day and some times tries to quit.      She states she follows with orthopedist Dr. Ernesto Dennis for shoulder pains with last visit on November 5, 2024 and is prescribed Norco and Mobic for pain with help.    She states her appetite is not as good as it used to be. She states she cooks for herself.    She states she continues to live alone. She is accompanied today by her daughter Izabella Sexton.     She saw Dr. Elmer Gupta, psychiatrist, on September 1, 2020 for surveillance of generalized anxiety disorder,  memory deficits and insomnia.       Current Outpatient Medications   Medication Sig    alcohol swabs PadM Apply 1 each topically as needed.     aspirin (ECOTRIN) 81 MG EC tablet Take 81 mg by mouth daily as needed for Pain.    atorvastatin (LIPITOR) 80 MG tablet TAKE 1 TABLET(80 MG) BY MOUTH EVERY EVENING    baclofen (LIORESAL) 5 mg Tab tablet Take 5 mg by mouth 3 (three) times daily. As needed    empagliflozin (JARDIANCE) 10 mg tablet Take 10 mg by mouth once daily.    HYDROcodone-acetaminophen (NORCO)  mg per tablet Take 1 tablet by mouth every 8 (eight) hours as needed.    levocetirizine (XYZAL) 5 MG tablet Take 1 tablet (5 mg total) by mouth every evening.    meloxicam (MOBIC) 15 MG tablet Take 15 mg by mouth daily as needed for Pain (shoulder pain). Prescribed by orthopedist    memantine (NAMENDA) 5 MG Tab TAKE 1 TABLET(5 MG) BY MOUTH TWICE DAILY    sacubitriL-valsartan (ENTRESTO) 24-26 mg per tablet Take 1 tablet by mouth 2 (two) times daily.    spironolactone (ALDACTONE) 25 MG tablet Take 25 mg by mouth.    blood glucose control, normal (ACCU-CHEK SMARTVIEW CONTRL SOL) Soln 1 each by Misc.(Non-Drug; Combo Route) route 2 (two) times daily.    blood-glucose meter kit Use as instructed    ergocalciferol (VITAMIN D2) 50,000 unit Cap Take 50,000 Units by mouth every 14 (fourteen) days. (Patient not taking: Reported on 1/30/2025)    TRUE METRIX GLUCOSE TEST STRIP Strp TEST TWICE DAILY (Patient not taking: Reported on 1/30/2025)    TRUEPLUS LANCETS 28 gauge Misc USE TO TEST BLOOD SUGAR TWICE DAILY (Patient not taking: Reported on 1/30/2025)    ULTRA THIN LANCETS 30 gauge Misc USE TWICE DAILY (Patient not taking: Reported on 1/30/2025)     SCREENINGS:   Cholesterol: January 18, 2024.   FFS/Colonoscopy: July 7, 2020 - benign colon polyp, diverticulosis; repeat in 5 years.   Mammogram: July 11, 2023 - benign.   GYN Exam (Breast exam): January 18, 2024.   Dexa Scan: July 11, 2023 - okay. March 16, 2018 - osteopenia.   Eye Exam: 2024 with [facility].   Dental Exam: 2024 per patient. She wears top dentures only.   PPD: Negative in the past.                   Immunization History   Administered Date(s) Administered    COVID-19, MRNA, LN-S, PF (MODERNA FULL 0.5 ML DOSE) 02/03/2021, 03/03/2021, 12/16/2021    COVID-19, mRNA, LNP-S, PF (Moderna) Ages 12+ 10/13/2023    COVID-19, mRNA, LNP-S, bivalent booster, PF (Moderna Omicron)12 + YEARS 10/31/2022    Influenza (FLUAD) - Quadrivalent - Adjuvanted - PF *Preferred* (65+) 09/29/2021    Influenza - Quadrivalent - High Dose - PF (65 years and older) 09/03/2020, 10/13/2023. She desires.    Influenza - Quadrivalent - PF *Preferred* (6 months and older) 10/31/2022    Influenza - Trivalent - Afluria, Fluzone MDV 10/29/2008, 09/14/2009, 11/05/2010, 10/06/2011, 10/10/2012, 10/31/2022         Influenza - Trivalent - Fluzone High Dose - PF (65 years and older) 09/17/2013, 11/20/2014, 10/29/2015, 11/21/2016, 09/27/2017, 01/02/2019, 10/09/2019    Influenza Split 10/29/2008, 09/14/2009, 11/05/2010, 10/06/2011, 10/10/2012    Pneumococcal Conjugate - 13 Valent 03/29/2016    Pneumococcal Polysaccharide - 23 Valent 03/23/2006, 03/26/2014    Rsv, Bivalent, Rsvpref (Abrysvo) 04/26/2024    Zoster 06/14/2012    Zoster Recombinant 10/13/2023, 04/26/2024    Td/Tdap - less than 10 years ago per patient.    ROS:  GENERAL: Denies fever, chills, fatigue or unusual weight change. Appetite good. Wt 66 kg (145 lb 8.1 oz) at January 18, 2024 visit. Reports not exercises. Reports monitors diet.   SKIN: Denies rashes, itching, changes in mole, color or texture of skin or easy bruising.   HEAD: Denies headaches or recent head trauma.   EYES: Denies change in vision, pain, diplopia, redness or discharge. Wears glasses.  EARS: Denies ear pain, discharge, vertigo or decreased hearing.   NOSE: Denies loss of smell, epistaxis or rhinitis.  MOUTH & THROAT: Denies hoarseness or change in voice. Denies excessive gum bleeding or mouth sores. Denies sore throat.  NODES: Denies swollen glands.  CHEST: Denies BRITO, wheezing, cough, or sputum  "production.  CARDIOVASCULAR: Denies PND, chest pain, orthopnea or reduced exercise tolerance. Denies palpitations. See history of present illness.  ABDOMEN: Denies constipation, nausea, vomiting, diarrhea, abdominal pain, or blood in stool.   URINARY: Denies flank pain, dysuria or hematuria.  GENITOURINARY: Denies flank pain, dysuria, frequency or hematuria. Performs monthly breast self exams. See history of present illness.  ENDOCRINE: Denies thyroid problems. See history of present illness..  HEME/LYMPH: Denies bleeding problems.  PERIPHERAL VASCULAR:Denies claudication or cyanosis.See history of present illness..  MUSCULOSKELETAL: Denies pain, stiffness, edema.   NEUROLOGIC: Denies history of seizures, tremors, paralysis, alteration of gait or coordination. Reports has stable, chronic memory issue. See history of present illness.  PSYCHIATRIC: Denies mood swings, depression, anxiety, homicidal or suicidal thoughts. Denies sleep problems.     PE:   BP (!) 112/58 (BP Location: Right arm, Patient Position: Sitting)   Pulse 80   Temp 98.3 °F (36.8 °C) (Tympanic)   Ht 5' 4" (1.626 m)   Wt 59.2 kg (130 lb 8.2 oz)   SpO2 97%   BMI 22.40 kg/m²   APPEARANCE: Well nourished, well developed female, elderly and pleasant, alert and oriented in no acute distress.   HEAD: Nontender. Full range of motion.  EYES: PERRL, conjunctiva pink, lids no edema. She wears glasses.   EARS: External canal not patent due to wax. No swelling or redness noted. TMM's not visualized due to wax.  NOSE: Mucosa and turbinates pink, not swollen. No discharge. Nontender sinuses.  THROAT: No pharyngeal erythema or exudate. No stridor. She has top dentures.   NECK: Supple, no mass, thyroid not enlarged.  NODES: No cervica or axillary lymph node enlargement.  CHEST: Normal respiratory effort. Lungs clear to auscultation.  CARDIOVASCULAR: Bradycardia today with normal S1, S2. No rubs, murmurs or gallops. PMI not displaced. Left carotid bruit not " heard today. Pacemaker at right anterior chest wall. Feet look okay without ulcerations or skin breaks. Chronic slightly decreased pedal pulses palpable bilaterally. No edema.  ABDOMEN: Bowel sounds present. Not distended. Soft. No tenderness, masses or organomegaly.  BREAST EXAM: Bilateral breast exam unremarkable.  PELVIC EXAM: Bimanual examination not examined as patient declines and as patient has had JACQUELINE/BSO due to noncancerous reasons.   RECTAL EXAM: Not examined per patient request.   MUSCULOSKELETAL: No joint deformities or stiffness. She is ambulatory without problems.  SKIN: No rashes or suspicious lesions, normal color and turgor.  NEUROLOGIC: Cranial Nerves: II-XII grossly intact. DTR's: Knees, Ankles 2+ and equal bilaterally. Monofilament test unremarkable. Gait & Posture: Normal gait and fine motion.  PSYCHIATRIC: Patient alert, oriented x 3. Mood/Affect normal without acute anxiety depression noted. Judgment/insight good as she makes appropriate decisions during today's examination but chronic, slight decreased memory deficit noted.    Protective Sensation (w/ 10 gram monofilament):  Right: Intact  Left: Intact    Visual Inspection:  Normal -  Bilateral    Pedal Pulses:   Right: Diminished - chronic.  Left: Diminished - chronic.    Posterior tibialis:   Right:Diminished - chronic.  Left: Diminished - chronic.     ASSESSMENT:    ICD-10-CM ICD-9-CM    1. Annual physical exam  Z00.00 V70.0       2. Need for vaccination  Z23 V05.9 Influenza - Trivalent (Adjuvanted)      3. Combined hyperlipidemia associated with type 2 diabetes mellitus - on medication E11.69 250.80 Comprehensive Metabolic Panel    E78.2 272.2 Lipid Panel      4. Hypertension associated with diabetes - on medication E11.59 250.80 TSH    I15.2 401.9 Comprehensive Metabolic Panel      Lipid Panel      CBC Auto Differential      5. Coronary artery disease involving native coronary artery of native heart without angina pectoris - stable and  managed by cardiology I25.10 414.01 aspirin (ECOTRIN) 81 MG EC tablet      6. Type 2 diabetes mellitus with peripheral artery disease  E11.51 250.70 Microalbumin/Creatinine Ratio, Urine     443.81 Hemoglobin A1C      7. At risk for falling  Z91.81 V15.88 Ambulatory referral/consult to Home Health      8. Congestive heart failure, unspecified HF chronicity, unspecified heart failure type - stable and managed by cardiology I50.9 428.0 Ambulatory referral/consult to Home Health      9. Dementia, unspecified dementia severity, unspecified dementia type, unspecified whether behavioral, psychotic, or mood disturbance or anxiety - stable F03.90 294.20       10. History of left breast cancer  Z85.3 V10.3       11. 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 Sinus node dysfunction - with pacemaker and managed by cardiology I49.5 427.81       14. Postmenopausal  Z78.0 V49.81         PLAN:  1. Age-appropriate counseling-appropriate low-sodium, low-cholesterol, low carbohydrate diet and exercise daily, monthly breast self exam, annual wellness examination.   2. Patient advised to call for results.  3. Continue current medications.  4. Prescription refill as noted above.  5. Annual eye examination.  6. Keep follow up with specialists.  7. Smoking cessation advised.   8. Keep follow up with specialists.  9. Add Ensure or Boost.  10. Follow up in 6 months (on 7/30/2025) for hypertension and diabetes follow up.    40 minutes of total time spent on the encounter, which includes face to face time and non-face to face time preparing to see the patient (eg, review of tests), Obtaining and/or reviewing separately obtained history, Documenting clinical information in the electronic or other health record, Independently interpreting results (not separately reported) and communicating results to the patient/family/caregiver, or Care coordination (not  separately reported).      This note is  generated with speech recognition software and is subject to transcription error and sound alike phrases that may be missed by proofreading.

## 2025-02-03 ENCOUNTER — TELEPHONE (OUTPATIENT)
Dept: FAMILY MEDICINE | Facility: CLINIC | Age: 85
End: 2025-02-03
Payer: MEDICARE

## 2025-02-03 NOTE — TELEPHONE ENCOUNTER
Patient contacted and informed of her results information. She verbally understood the information given.

## 2025-02-03 NOTE — TELEPHONE ENCOUNTER
----- Message from Tia sent at 2/3/2025  9:12 AM CST -----  Who Called: Pt    What is the request in detail: Requesting call back to discuss results. Please advise    Can the clinic reply by [facility]? No    Best Call Back Number: [phone]      Additional Information:

## 2025-02-03 NOTE — TELEPHONE ENCOUNTER
Tell patient lab results shows stable, good diabetes control; stable, decreased kidney function; stable, borderline anemia with other results within acceptable range. Continue current medications. Thanks for call.

## 2025-02-06 ENCOUNTER — TELEPHONE (OUTPATIENT)
Dept: FAMILY MEDICINE | Facility: CLINIC | Age: 85
End: 2025-02-06
Payer: MEDICARE

## 2025-02-06 PROCEDURE — G0180 MD CERTIFICATION HHA PATIENT: HCPCS | Mod: ,,, | Performed by: FAMILY MEDICINE

## 2025-02-06 NOTE — TELEPHONE ENCOUNTER
Returned call from David. David with Home Health called to update us and let us know that pt was approved for home health services and will begin receiving home PT.

## 2025-02-06 NOTE — TELEPHONE ENCOUNTER
----- Message from Brenda sent at 2/6/2025 11:32 AM CST -----  Contact: David ([facility])  Mrs. Hernandez called to inform that the patient home health was approved on today. A good call back number is [phone].    Thanks

## 2025-02-20 ENCOUNTER — TELEPHONE (OUTPATIENT)
Dept: ADMINISTRATIVE | Facility: CLINIC | Age: 85
End: 2025-02-20
Payer: MEDICARE

## 2025-02-20 NOTE — TELEPHONE ENCOUNTER
Returned call to Liz with home health. Order given for MSW deloris to assist with community resources.

## 2025-02-24 DIAGNOSIS — Z00.00 ENCOUNTER FOR MEDICARE ANNUAL WELLNESS EXAM: ICD-10-CM

## 2025-03-19 ENCOUNTER — TELEPHONE (OUTPATIENT)
Dept: FAMILY MEDICINE | Facility: CLINIC | Age: 85
End: 2025-03-19
Payer: MEDICARE

## 2025-03-19 NOTE — TELEPHONE ENCOUNTER
Pt called in stating she is going to drop off paperwork to be completed regarding HH referral placed at  w/you on 1/30. Pt states paperwork needs to be completed by 4/9. Advised pt we will have you review and will call her for additional info if needed.

## 2025-03-20 ENCOUNTER — TELEPHONE (OUTPATIENT)
Dept: FAMILY MEDICINE | Facility: CLINIC | Age: 85
End: 2025-03-20
Payer: MEDICARE

## 2025-03-21 DIAGNOSIS — E11.69 COMBINED HYPERLIPIDEMIA ASSOCIATED WITH TYPE 2 DIABETES MELLITUS: ICD-10-CM

## 2025-03-21 DIAGNOSIS — E78.2 COMBINED HYPERLIPIDEMIA ASSOCIATED WITH TYPE 2 DIABETES MELLITUS: ICD-10-CM

## 2025-03-21 RX ORDER — ATORVASTATIN CALCIUM 80 MG/1
TABLET, FILM COATED ORAL
Qty: 90 TABLET | Refills: 0 | OUTPATIENT
Start: 2025-03-21

## 2025-03-21 NOTE — TELEPHONE ENCOUNTER
Refill Decision Note   Danyelle Garcia  is requesting a refill authorization.  Brief Assessment and Rationale for Refill:  Quick Discontinue     Medication Therapy Plan:  no sig request Pharmacy is requesting new scripts for the following medications without required information, (sig/ frequency/qty/etc)      Medication Reconciliation Completed: No     Comments: Pharmacies have been requesting medications for patients without required information, (sig, frequency, qty, etc.). In addition, requests are sent for medication(s) pt. are currently not taking, and medications patients have never taken.    We have spoken to the pharmacies about these request types and advised their teams previously that we are unable to assess these New Script requests and require all details for these requests. This is a known issue and has been reported.     Note composed:9:43 AM 03/21/2025

## 2025-03-21 NOTE — TELEPHONE ENCOUNTER
No care due was identified.  [facility] Embedded Care Due Messages. Reference number: [id number].   3/21/2025 9:28:33 AM CDT

## 2025-03-27 ENCOUNTER — EXTERNAL HOME HEALTH (OUTPATIENT)
Dept: HOME HEALTH SERVICES | Facility: HOSPITAL | Age: 85
End: 2025-03-27
Payer: MEDICARE

## 2025-04-03 DIAGNOSIS — E78.2 COMBINED HYPERLIPIDEMIA ASSOCIATED WITH TYPE 2 DIABETES MELLITUS: ICD-10-CM

## 2025-04-03 DIAGNOSIS — E11.69 COMBINED HYPERLIPIDEMIA ASSOCIATED WITH TYPE 2 DIABETES MELLITUS: ICD-10-CM

## 2025-04-03 RX ORDER — ATORVASTATIN CALCIUM 80 MG/1
80 TABLET, FILM COATED ORAL NIGHTLY
Qty: 90 TABLET | Refills: 3 | Status: SHIPPED | OUTPATIENT
Start: 2025-04-03

## 2025-04-03 NOTE — TELEPHONE ENCOUNTER
Refill Decision Note   Danyelle Radha  is requesting a refill authorization.    Brief Assessment and Rationale for Refill:   Approve       Medication Therapy Plan:         Comments:     Note composed:3:06 PM 04/03/2025

## 2025-04-03 NOTE — TELEPHONE ENCOUNTER
No care due was identified.  [facility] Embedded Care Due Messages. Reference number: [id number].   4/03/2025 2:16:41 PM CDT

## 2025-04-07 ENCOUNTER — DOCUMENT SCAN (OUTPATIENT)
Dept: HOME HEALTH SERVICES | Facility: HOSPITAL | Age: 85
End: 2025-04-07
Payer: MEDICARE

## 2025-04-09 ENCOUNTER — DOCUMENT SCAN (OUTPATIENT)
Dept: HOME HEALTH SERVICES | Facility: HOSPITAL | Age: 85
End: 2025-04-09
Payer: MEDICARE

## 2025-05-28 ENCOUNTER — TELEPHONE (OUTPATIENT)
Dept: PHARMACY | Facility: CLINIC | Age: 85
End: 2025-05-28
Payer: MEDICARE

## 2025-05-29 NOTE — TELEPHONE ENCOUNTER
[facility]/Population Health Chart Review & Patient Outreach Details For Medication Adherence Project    Reason for Outreach Encounter: 3rd Party payor non-compliance report ([employer], [employer], [employer], etc)  2.  Patient Outreach Method: Reviewed patient chart   3.   Medication in question:    Diabetes Medications              empagliflozin (JARDIANCE) 10 mg tablet Take 10 mg by mouth once daily.                 jardiance  last filled  4/20 for 100 day supply      4.  Reviewed and or Updates Made To: Patient Chart  5. Outreach Outcomes and/or actions taken: Patient filled medication and is on track to be adherent  Additional Notes:

## 2025-07-02 ENCOUNTER — LAB VISIT (OUTPATIENT)
Dept: LAB | Facility: HOSPITAL | Age: 85
End: 2025-07-02
Attending: REGISTERED NURSE
Payer: MEDICARE

## 2025-07-02 ENCOUNTER — OFFICE VISIT (OUTPATIENT)
Dept: FAMILY MEDICINE | Facility: CLINIC | Age: 85
End: 2025-07-02
Payer: MEDICARE

## 2025-07-02 VITALS
TEMPERATURE: 98 F | DIASTOLIC BLOOD PRESSURE: 62 MMHG | HEART RATE: 89 BPM | BODY MASS INDEX: 21.89 KG/M2 | HEIGHT: 64 IN | SYSTOLIC BLOOD PRESSURE: 132 MMHG | OXYGEN SATURATION: 99 % | WEIGHT: 128.19 LBS

## 2025-07-02 DIAGNOSIS — N18.32 TYPE 2 DIABETES MELLITUS WITH STAGE 3B CHRONIC KIDNEY DISEASE, WITHOUT LONG-TERM CURRENT USE OF INSULIN: ICD-10-CM

## 2025-07-02 DIAGNOSIS — I10 HYPERTENSION, UNSPECIFIED TYPE: ICD-10-CM

## 2025-07-02 DIAGNOSIS — E11.22 TYPE 2 DIABETES MELLITUS WITH STAGE 3B CHRONIC KIDNEY DISEASE, WITHOUT LONG-TERM CURRENT USE OF INSULIN: ICD-10-CM

## 2025-07-02 DIAGNOSIS — E78.5 HYPERLIPIDEMIA, UNSPECIFIED HYPERLIPIDEMIA TYPE: ICD-10-CM

## 2025-07-02 DIAGNOSIS — I10 HYPERTENSION, UNSPECIFIED TYPE: Primary | ICD-10-CM

## 2025-07-02 DIAGNOSIS — I50.9 CONGESTIVE HEART FAILURE, UNSPECIFIED HF CHRONICITY, UNSPECIFIED HEART FAILURE TYPE: ICD-10-CM

## 2025-07-02 LAB
ANION GAP (OHS): 9 MMOL/L (ref 8–16)
BUN SERPL-MCNC: 19 MG/DL (ref 8–23)
CALCIUM SERPL-MCNC: 9.8 MG/DL (ref 8.7–10.5)
CHLORIDE SERPL-SCNC: 104 MMOL/L (ref 95–110)
CO2 SERPL-SCNC: 23 MMOL/L (ref 23–29)
CREAT SERPL-MCNC: 1.2 MG/DL (ref 0.5–1.4)
EAG (OHS): 108 MG/DL (ref 68–131)
GFR SERPLBLD CREATININE-BSD FMLA CKD-EPI: 45 ML/MIN/1.73/M2
GLUCOSE SERPL-MCNC: 97 MG/DL (ref 70–110)
HBA1C MFR BLD: 5.4 % (ref 4–5.6)
POTASSIUM SERPL-SCNC: 4 MMOL/L (ref 3.5–5.1)
SODIUM SERPL-SCNC: 136 MMOL/L (ref 136–145)

## 2025-07-02 PROCEDURE — 3078F DIAST BP <80 MM HG: CPT | Mod: CPTII,HCNC,S$GLB, | Performed by: REGISTERED NURSE

## 2025-07-02 PROCEDURE — 3288F FALL RISK ASSESSMENT DOCD: CPT | Mod: CPTII,HCNC,S$GLB, | Performed by: REGISTERED NURSE

## 2025-07-02 PROCEDURE — G2211 COMPLEX E/M VISIT ADD ON: HCPCS | Mod: HCNC,S$GLB,, | Performed by: REGISTERED NURSE

## 2025-07-02 PROCEDURE — 80048 BASIC METABOLIC PNL TOTAL CA: CPT | Mod: HCNC

## 2025-07-02 PROCEDURE — 83036 HEMOGLOBIN GLYCOSYLATED A1C: CPT | Mod: HCNC

## 2025-07-02 PROCEDURE — 3075F SYST BP GE 130 - 139MM HG: CPT | Mod: CPTII,HCNC,S$GLB, | Performed by: REGISTERED NURSE

## 2025-07-02 PROCEDURE — 99999 PR PBB SHADOW E&M-EST. PATIENT-LVL III: CPT | Mod: PBBFAC,HCNC,, | Performed by: REGISTERED NURSE

## 2025-07-02 PROCEDURE — 36415 COLL VENOUS BLD VENIPUNCTURE: CPT | Mod: HCNC,PO

## 2025-07-02 PROCEDURE — 1101F PT FALLS ASSESS-DOCD LE1/YR: CPT | Mod: CPTII,HCNC,S$GLB, | Performed by: REGISTERED NURSE

## 2025-07-02 PROCEDURE — 1126F AMNT PAIN NOTED NONE PRSNT: CPT | Mod: CPTII,HCNC,S$GLB, | Performed by: REGISTERED NURSE

## 2025-07-02 PROCEDURE — 1159F MED LIST DOCD IN RCRD: CPT | Mod: CPTII,HCNC,S$GLB, | Performed by: REGISTERED NURSE

## 2025-07-02 PROCEDURE — 99214 OFFICE O/P EST MOD 30 MIN: CPT | Mod: HCNC,S$GLB,, | Performed by: REGISTERED NURSE

## 2025-07-02 RX ORDER — ATORVASTATIN CALCIUM 80 MG/1
80 TABLET, FILM COATED ORAL NIGHTLY
Qty: 90 TABLET | Refills: 1 | Status: SHIPPED | OUTPATIENT
Start: 2025-07-02

## 2025-07-02 NOTE — PROGRESS NOTES
Danyelle Garcia  MRN:  [id number]  84 y.o. female      Patient Care Team:  Dilma Chan MD as PCP - General (Family Medicine)  Joshua Giron OD (Optometry)  Vu Christiansen FNP-C as Nurse Practitioner (Family Medicine)      SUBJECTIVE:     CHIEF COMPLAINT:  - Follow-up    HPI:  Ms. Garcia is here for follow-up on chronic medical conditions. She is evaluated by Dr. Garces (Cardiology) every 6 months. Followed for chronic CHF, HTN, CAD and carotid stenosis. Taking ASA 81 mg qd, statin, and Entresto as ordered. Does not monitor home blood sugars for chronic Type-2 diabetes ---- on statin with Jardiance.       Review of Systems   Constitutional: Negative.    Eyes:  Negative for blurred vision, double vision and pain.   Respiratory: Negative.     Cardiovascular: Negative.    Gastrointestinal: Negative.    Genitourinary: Negative.    Skin:  Negative for itching and rash.   Neurological:  Negative for dizziness, tingling, tremors, seizures, loss of consciousness and headaches.         Review of patient's allergies indicates:  No Known Allergies      Problem List[1]      Current Outpatient Medications   Medication Instructions    alcohol swabs PadM 1 each, Topical (Top), As needed (PRN)    aspirin (ECOTRIN) 81 mg, Oral, Daily PRN    atorvastatin (LIPITOR) 80 mg, Oral, Nightly    baclofen (LIORESAL) 5 mg, 3 times daily    empagliflozin (JARDIANCE) 10 mg, Daily    ergocalciferol (VITAMIN D2) 50,000 Units, Every 14 days    HYDROcodone-acetaminophen (NORCO)  mg per tablet 1 tablet, Every 8 hours PRN    levocetirizine (XYZAL) 5 mg, Oral, Nightly    meloxicam (MOBIC) 15 mg, Daily PRN    memantine (NAMENDA) 5 MG Tab TAKE 1 TABLET(5 MG) BY MOUTH TWICE DAILY    sacubitriL-valsartan (ENTRESTO) 24-26 mg per tablet 1 tablet, 2 times daily         Past medical, surgical, family and social histories have been reviewed today.      OBJECTIVE:     Vitals:    07/02/25 0902   BP: 132/62   Pulse: 89   Temp: 97.5 °F (36.4 °C)  "  TempSrc: Tympanic   SpO2: 99%   Weight: 58.2 kg (128 lb 3.2 oz)   Height: 5' 4" (1.626 m)     Vitals:    07/02/25 0902   PainSc: 0-No pain       Physical Exam  Vitals reviewed.   Constitutional:       General: She is not in acute distress.  HENT:      Head: Normocephalic and atraumatic.   Cardiovascular:      Rate and Rhythm: Normal rate and regular rhythm.   Pulmonary:      Effort: Pulmonary effort is normal.      Breath sounds: Normal breath sounds.   Musculoskeletal:      Right lower leg: No edema.      Left lower leg: No edema.   Skin:     Capillary Refill: Capillary refill takes less than 2 seconds.   Neurological:      Mental Status: She is alert and oriented to person, place, and time.      Motor: No weakness.      Gait: Gait normal.   Psychiatric:         Mood and Affect: Mood normal.         Behavior: Behavior normal.         Thought Content: Thought content normal.         Judgment: Judgment normal.         ASSESSMENT:     1. Hypertension, unspecified type ---- chronic issue, on med as ordered, doing well/stable.  Per Card.  -     Basic Metabolic Panel; Future; Expected date: 07/02/2025    Lab Results   Component Value Date     (L) 01/30/2025    K 4.6 01/30/2025     01/30/2025    CO2 21 (L) 01/30/2025    BUN 21 01/30/2025    CREATININE 1.3 01/30/2025    CALCIUM 9.9 01/30/2025    ANIONGAP 11 01/30/2025    EGFRNORACEVR 40.5 (A) 01/30/2025     2. Congestive heart failure, unspecified HF chronicity, unspecified heart failure type --- chronic issue, managed by Card, on Entresto as ordered.    3. Type 2 diabetes mellitus with stage 3b chronic kidney disease, without long-term current use of insulin ---- chronic issue, well-controlled.  On med as ordered.  -     Basic Metabolic Panel; Future; Expected date: 07/02/2025  -     Hemoglobin A1C; Future; Expected date: 07/02/2025    Lab Results   Component Value Date    HGBA1C 5.7 (H) 01/30/2025     Microalb/Creat Ratio   Date Value Ref Range Status "   01/30/2025 12.5 0.0 - 30.0 ug/mg Final     4. Hyperlipidemia, unspecified hyperlipidemia type ---- chronic issue, on statin/refilled.  Discussed proper/healthy diet and lifestyle changes.  -     atorvastatin (LIPITOR) 80 MG tablet; Take 1 tablet (80 mg total) by mouth every evening.  Dispense: 90 tablet; Refill: 1      PLAN:     Lab today, pending.  Follow-up with PCP as scheduled on 8/11/25.  RTC as directed and/or prn.        HINA Gutierres  [facility]         30 minutes of total time spent on the encounter, which includes face to face time and non-face to face time preparing to see the patient.  This includes obtaining and/or reviewing separately obtained history, performing a medically appropriate examination and/or evaluation, and counseling and educating the patient/family/caregiver.  Includes documenting clinical information in the electronic or other health record, independently interpreting results (not separately reported) and communicating results to the patient/family/caregiver, with care coordination (not separately reported).  Medications, tests and/or procedures ordered as necessary along with referring and communicating with other health professionals (when not separately reported).         [1]   Patient Active Problem List  Diagnosis    Tobacco use disorder    CAD (coronary artery disease)    Vitamin D deficiency    Hypertension associated with diabetes    Combined hyperlipidemia associated with type 2 diabetes mellitus    History of left breast cancer    Type 2 diabetes mellitus with peripheral artery disease    Carotid artery disease    Memory deficit    Benign colon polyp    Congestive heart failure    Amyloid heart disease    BRAYAN (generalized anxiety disorder)    Sinus node dysfunction    Dementia, unspecified dementia severity, unspecified dementia type, unspecified whether behavioral, psychotic, or mood disturbance or anxiety

## 2025-07-06 ENCOUNTER — RESULTS FOLLOW-UP (OUTPATIENT)
Dept: FAMILY MEDICINE | Facility: CLINIC | Age: 85
End: 2025-07-06

## 2025-07-08 ENCOUNTER — TELEPHONE (OUTPATIENT)
Dept: FAMILY MEDICINE | Facility: CLINIC | Age: 85
End: 2025-07-08
Payer: MEDICARE

## 2025-07-08 NOTE — TELEPHONE ENCOUNTER
Copied from CRM #[id number]. Topic: General Inquiry - Status Check  >> Jul 8, 2025 10:56 AM Mliagro wrote:  .Type:  Needs Medical Advice    Who Called: Keyana ([facility])   Would the patient rather a call back or a response via [facility]? callback  Best Call Back Number:.[phone]  Additional Information: keyana from [facility] is calling to check the status of the pt paperwork that was supposed to be faxed back to [phone]  Can you please call the rep when the paperwork to completed rep states if she is allowed to  the paperwork she will [phone] (keyana)

## 2025-07-09 ENCOUNTER — TELEPHONE (OUTPATIENT)
Dept: FAMILY MEDICINE | Facility: CLINIC | Age: 85
End: 2025-07-09
Payer: MEDICARE

## 2025-07-09 NOTE — TELEPHONE ENCOUNTER
Called pt back and she was asking about a from for her insurance. Pt stating it was sent already, checked previous notes in charts and talked to wan. She stated that something happened wit the printer and the fax was not received. Called pts insurance and requested it be re-faxed. Lady I spoke with said she would get with the agent and have them re-fax it to our office.    Participated in Music Therapy group with focus on mood elevation, validation and decreasing anxiety and improved group cohesiveness. Engaged and cooperative in music listening interventions.  Appeared to possibly have delayed responses, and be a bit physically unsteady, but showed progress in session goals.

## 2025-07-09 NOTE — TELEPHONE ENCOUNTER
Copied from CRM #[id number]. Topic: General Inquiry - Patient Advice  >> Jul 9, 2025 10:06 AM Shobha wrote:  Type:  Needs Medical Advice    Who Called: Danyelle  Symptoms (please be specific): medical records  How long has patient had these symptoms:   Pharmacy name and phone #:   Would the patient rather a call back or a response via [facility]?call back   Best Call Back Number: [phone]  Additional Information: patient called in this morning stating the paperwork that was supposed to be faxed to her insurance company has not be faxed. She states that they have called and so has she but has not gotten a response. Please call back . Thanks tpw

## 2025-07-11 NOTE — PROGRESS NOTES
Subjective:       Patient ID: Danyelle Garcia is a 76 y.o. female.    Chief Complaint: Follow-up    HPI He is a 76year old  lady who comes for follow up of her previously diagnosed triple negative breast cancer.  on  was diagnosed as having cancer of the left breast and underwent  lumpectomy along with sentinel lymph node biopsy. The pathology report from  the [facility] indicated that the   patient had an invasive ductal carcinoma of the breast measuring 1.5 cm in   diameter.     The ER receptors and the HER-2 kris tests were negative.   She received 4 cycles of adjuvant AC which she tolerated well.  She had radiation therapy.  She has stable, chronic Bell's palsy  She also has HBP , diabetes and elevated cholesterol for which she takes medications    She has a history of chronic back pain which was followed outside the clinic.   S t ALLERGIES: No known drug allergies.     CURRENT MEDICATIONS: see med card.     PREVIOUS SURGERIES: Cholecystectomy, hysterectomy, lumpectomy and sentinel   lymph node biopsy on 3/17/05.     SOCIAL HISTORY: She is single. She has three children. She lives in [place]. She used to smoke for 20 years averaging 4 to 5 cigarettes a day and  stopped a month ago. She drinks about a six pack of beer a month. She used   to work in [employer].     FAMILY DISEASES: No diabetes or cancer in the family. Father  of a   heart attack.     PAST MEDICAL HISTORY:  1-Breast cancer s/p surgery. radiaion therapy and adjuvant chemo.  2-chronic stable Bell's palsy  3-diabetes mellitus  4-elevated cholesterol  Review of Systems   Constitutional: Negative.    HENT: Negative.    Eyes: Negative.    Respiratory: Negative.  Negative for cough and wheezing.    Cardiovascular: Negative.  Negative for chest pain.   Gastrointestinal: Negative.    Genitourinary: Negative.    Neurological: Negative.    Psychiatric/Behavioral: Negative.         Objective:      Physical Exam   Constitutional: She is oriented to person, place, and time. She appears well-developed. No distress.   HENT:   Head: Normocephalic.   Right Ear: Tympanic membrane, external ear and ear canal normal.   Left Ear: Tympanic membrane, external ear and ear canal normal.   Nose: Nose normal. Right sinus exhibits no maxillary sinus tenderness and no frontal sinus tenderness. Left sinus exhibits no maxillary sinus tenderness and no frontal sinus tenderness.   Mouth/Throat: Oropharynx is clear and moist and mucous membranes are normal.   Teeth normal.  Gums normal.   Eyes: Conjunctivae and lids are normal. Pupils are equal, round, and reactive to light.   Neck: Normal carotid pulses, no hepatojugular reflux and no JVD present. Carotid bruit is not present. No tracheal deviation present. No thyroid mass and no thyromegaly present.   Cardiovascular: Normal rate, regular rhythm, S1 normal, S2 normal, normal heart sounds and intact distal pulses.  Exam reveals no gallop and no friction rub.    No murmur heard.  Carotid exam normal   Pulmonary/Chest: Effort normal and breath sounds normal. No accessory muscle usage. No respiratory distress. She has no wheezes. She has no rales. She exhibits no tenderness.   Abdominal: Soft. Normal appearance. She exhibits no distension and no mass. There is no splenomegaly or hepatomegaly. There is no tenderness. There is no rebound and no guarding.   Musculoskeletal: Normal range of motion. She exhibits no edema or tenderness.        Right hand: Normal.        Left hand: Normal.       Lymphadenopathy:     She has no cervical adenopathy.     She has no axillary adenopathy.        Right: No inguinal and no supraclavicular adenopathy present.        Left: No inguinal and no supraclavicular adenopathy present.   Neurological: She is alert and oriented to person, place, and time. She has normal strength. No cranial nerve deficit. Coordination normal.   Skin: Skin is  [FreeTextEntry3] :  This note was written by Lukasz Graham on 07/11/2025 acting solely as a scribe for Dr. Jose Lofton.   All medical record entries made by the Scribe were at my, Dr. Jose Lofton, direction and personally dictated by me on 07/11/2025. I have personally reviewed the chart and agree that the record accurately reflects my personal performance of the history, physical exam, assessment and plan.  warm and dry. No rash noted. She is not diaphoretic. No cyanosis or erythema. No pallor. Nails show no clubbing.   Psychiatric: She has a normal mood and affect. Her behavior is normal. Judgment and thought content normal.       Wt Readings from Last 3 Encounters:   07/18/17 75.7 kg (166 lb 14.2 oz)   05/26/17 73.3 kg (161 lb 9.6 oz)   03/29/17 74.6 kg (164 lb 7.4 oz)     Temp Readings from Last 3 Encounters:   07/18/17 98.7 °F (37.1 °C) (Oral)   05/26/17 97.6 °F (36.4 °C) (Tympanic)   03/29/17 96.3 °F (35.7 °C) (Tympanic)     BP Readings from Last 3 Encounters:   07/18/17 (!) 148/52   05/26/17 (!) 146/72   03/29/17 128/72     Pulse Readings from Last 3 Encounters:   07/18/17 (!) 43   05/26/17 62   03/29/17 72       Assessment:       1. Malignant neoplasm of left breast in female, estrogen receptor positive, unspecified site of breast        Plan:       Lab Results   Component Value Date    WBC 5.56 07/18/2017    HGB 12.9 07/18/2017    HCT 37.6 07/18/2017    MCV 95 07/18/2017     07/18/2017   She was supposed to have mammograms in 4/2017 but did not. We will reschedule

## 2025-07-15 ENCOUNTER — TELEPHONE (OUTPATIENT)
Dept: FAMILY MEDICINE | Facility: CLINIC | Age: 85
End: 2025-07-15
Payer: MEDICARE

## 2025-07-15 NOTE — TELEPHONE ENCOUNTER
Copied from CRM #[id number]. Topic: General Inquiry - Status Check  >> Jul 15, 2025  9:45 AM Milagro wrote:  .Type:  Needs Medical Advice    Who Called: tiffanie ([facility])  Symptoms (please be specific):    How long has patient had these symptoms:    Pharmacy name and phone #:    Would the patient rather a call back or a response via [facility]? Call back  Best Call Back Number: [phone]  Additional Information: pt rep is calling to get form faxed back that states if the pt is able to drive due to her age please fax form back to tiffanie at [phone] (fax)

## 2025-07-21 ENCOUNTER — OFFICE VISIT (OUTPATIENT)
Dept: FAMILY MEDICINE | Facility: CLINIC | Age: 85
End: 2025-07-21
Payer: MEDICARE

## 2025-07-21 VITALS
OXYGEN SATURATION: 98 % | TEMPERATURE: 98 F | SYSTOLIC BLOOD PRESSURE: 120 MMHG | BODY MASS INDEX: 21.83 KG/M2 | HEART RATE: 88 BPM | DIASTOLIC BLOOD PRESSURE: 68 MMHG | WEIGHT: 127.88 LBS | HEIGHT: 64 IN

## 2025-07-21 DIAGNOSIS — E11.69 COMBINED HYPERLIPIDEMIA ASSOCIATED WITH TYPE 2 DIABETES MELLITUS: ICD-10-CM

## 2025-07-21 DIAGNOSIS — Z02.89 ENCOUNTER FOR OTHER ADMINISTRATIVE EXAMINATIONS: Primary | ICD-10-CM

## 2025-07-21 DIAGNOSIS — E11.59 HYPERTENSION ASSOCIATED WITH DIABETES: ICD-10-CM

## 2025-07-21 DIAGNOSIS — F03.90 DEMENTIA, UNSPECIFIED DEMENTIA SEVERITY, UNSPECIFIED DEMENTIA TYPE, UNSPECIFIED WHETHER BEHAVIORAL, PSYCHOTIC, OR MOOD DISTURBANCE OR ANXIETY: ICD-10-CM

## 2025-07-21 DIAGNOSIS — E11.51 TYPE 2 DIABETES MELLITUS WITH PERIPHERAL ARTERY DISEASE: ICD-10-CM

## 2025-07-21 DIAGNOSIS — I50.9 CONGESTIVE HEART FAILURE, UNSPECIFIED HF CHRONICITY, UNSPECIFIED HEART FAILURE TYPE: ICD-10-CM

## 2025-07-21 DIAGNOSIS — E78.2 COMBINED HYPERLIPIDEMIA ASSOCIATED WITH TYPE 2 DIABETES MELLITUS: ICD-10-CM

## 2025-07-21 DIAGNOSIS — I15.2 HYPERTENSION ASSOCIATED WITH DIABETES: ICD-10-CM

## 2025-07-21 PROCEDURE — 99213 OFFICE O/P EST LOW 20 MIN: CPT | Mod: HCNC,S$GLB,,

## 2025-07-21 PROCEDURE — 1126F AMNT PAIN NOTED NONE PRSNT: CPT | Mod: CPTII,HCNC,S$GLB,

## 2025-07-21 PROCEDURE — 3078F DIAST BP <80 MM HG: CPT | Mod: CPTII,HCNC,S$GLB,

## 2025-07-21 PROCEDURE — 1160F RVW MEDS BY RX/DR IN RCRD: CPT | Mod: CPTII,HCNC,S$GLB,

## 2025-07-21 PROCEDURE — 3074F SYST BP LT 130 MM HG: CPT | Mod: CPTII,HCNC,S$GLB,

## 2025-07-21 PROCEDURE — 1159F MED LIST DOCD IN RCRD: CPT | Mod: CPTII,HCNC,S$GLB,

## 2025-07-21 PROCEDURE — 99999 PR PBB SHADOW E&M-EST. PATIENT-LVL III: CPT | Mod: PBBFAC,HCNC,,

## 2025-07-21 PROCEDURE — 1101F PT FALLS ASSESS-DOCD LE1/YR: CPT | Mod: CPTII,HCNC,S$GLB,

## 2025-07-21 PROCEDURE — 3288F FALL RISK ASSESSMENT DOCD: CPT | Mod: CPTII,HCNC,S$GLB,

## 2025-07-21 NOTE — PROGRESS NOTES
Subjective      Danyelle Garcia  07/21/2025  [id number]    Dilma Chan MD  Patient Care Team:  Dilma Chan MD as PCP - General (Family Medicine)  Joshua Giron OD (Optometry)  Vu Christiansen FNP-C as Nurse Practitioner (Family Medicine)    Chief Complaint:  Chief Complaint   Patient presents with    insurance paperwork       History of Present Illness:    Ms. Garcia presents today for paperwork completion for driving insurance. States she recently brought a new car and is required to have documentation about health conditions prior to being covered. She has a significant past medical history including anxiety, hyperlipidemia, diabetes mellitus, heart failure, hypertension, and dementia. She denies any acute concerns related to these chronic conditions during today's visit.    Review of Systems   Constitutional:  Negative for fatigue and fever.   Eyes:  Negative for visual disturbance.   Respiratory:  Negative for shortness of breath.    Cardiovascular:  Negative for chest pain.   Gastrointestinal:  Negative for abdominal pain, nausea and vomiting.   Musculoskeletal:  Negative for arthralgias, back pain and myalgias.   Neurological:  Negative for dizziness, weakness and headaches.   Psychiatric/Behavioral:  Negative for depressed mood, sleep disturbance and suicidal ideas. The patient is not nervous/anxious.         History:  Past Medical History:   Diagnosis Date    Breast cancer 2006    left breast treated with lumpectomy, radiation, and chemo    CAD (coronary artery disease)     Colon polyp     Congestive heart failure 11/23/2021    Congestive heart failure     Diabetes mellitus, type 2     Diverticular disease     Dizziness     External hemorrhoid     Gastroesophageal reflux disease 10/29/2015    H. pylori infection     Headache     Hyperlipidemia     Hypertension     Insomnia     Osteopenia     Postmenopausal     Serum calcium elevated 03/19/2018    Tobacco use     Vitamin D deficiency  disease      Past Surgical History:   Procedure Laterality Date    BREAST LUMPECTOMY Left     CATARACT EXTRACTION      CHOLECYSTECTOMY      COLONOSCOPY      COLONOSCOPY N/A 7/7/2020    Procedure: COLONOSCOPY rapid covid-19;  Surgeon: Sean Paz MD;  Location: [facility];  Service: Endoscopy;  Laterality: N/A;    EYE SURGERY Right     cataract    HYSTERECTOMY      left lumpectomy      TOTAL ABDOMINAL HYSTERECTOMY W/ BILATERAL SALPINGOOPHORECTOMY      Due to benign reasons per patient    UPPER GASTROINTESTINAL ENDOSCOPY       Family History   Problem Relation Name Age of Onset    Hypertension Mother      Heart attack Father      Heart disease Father          MI/CAD    Diabetes Daughter      Diabetes Son      No Known Problems Daughter      No Known Problems Son      Cancer Neg Hx      Stroke Neg Hx       Social History[1]     Review of patient's allergies indicates:  No Known Allergies    **The following were reviewed at this visit: active problem list, medication list, allergies, family history, social history, and health maintenance.    Medications:  Medications Ordered Prior to Encounter[2]    **Medications have been reviewed and reconciled with patient at this visit.          Objective       Vitals:    07/21/25 1113   BP: 120/68   Pulse: 88   Temp: 97.7 °F (36.5 °C)      Body mass index is 21.95 kg/m².    Wt Readings from Last 3 Encounters:   07/21/25 58 kg (127 lb 13.9 oz)   07/02/25 58.2 kg (128 lb 3.2 oz)   01/30/25 59.2 kg (130 lb 8.2 oz)     Temp Readings from Last 3 Encounters:   07/21/25 97.7 °F (36.5 °C) (Tympanic)   07/02/25 97.5 °F (36.4 °C) (Tympanic)   01/30/25 98.3 °F (36.8 °C) (Tympanic)     BP Readings from Last 3 Encounters:   07/21/25 120/68   07/02/25 132/62   01/30/25 (!) 112/58     Pulse Readings from Last 3 Encounters:   07/21/25 88   07/02/25 89   01/30/25 80         Laboratory Reviewed ({Yes)  Lab Results   Component Value Date    WBC 8.33 01/30/2025    HGB 11.4 (L) 01/30/2025     HCT 34.3 (L) 01/30/2025     01/30/2025    CHOL 120 01/30/2025    TRIG 101 01/30/2025    HDL 34 (L) 01/30/2025    ALT 13 01/30/2025    AST 23 01/30/2025     07/02/2025    K 4.0 07/02/2025     07/02/2025    CREATININE 1.2 07/02/2025    BUN 19 07/02/2025    CO2 23 07/02/2025    TSH 1.338 01/30/2025    INR 1.1 11/27/2023    HGBA1C 5.4 07/02/2025       Physical Exam  Vitals reviewed.   Constitutional:       General: She is not in acute distress.     Appearance: Normal appearance. She is not ill-appearing.   HENT:      Head: Normocephalic and atraumatic.   Cardiovascular:      Rate and Rhythm: Normal rate and regular rhythm.      Pulses: Normal pulses.      Heart sounds: Normal heart sounds.   Pulmonary:      Effort: Pulmonary effort is normal.      Breath sounds: Normal breath sounds.   Musculoskeletal:         General: Normal range of motion.   Neurological:      General: No focal deficit present.      Mental Status: She is alert and oriented to person, place, and time.   Psychiatric:         Mood and Affect: Mood normal.         Behavior: Behavior normal.         Thought Content: Thought content normal.         Judgment: Judgment normal.              Assessment and Plan      1. Encounter for other administrative examinations   - documentation completed following exam. Faxed to appropriate fax number and copy provided to patient for her records.     2. Type 2 diabetes mellitus with peripheral artery disease   - chronic, stable on jardiance per PCP    3. Hypertension associated with diabetes   - chronic, stable on antihypertensives per PCP/cardiology    4. Dementia, unspecified dementia severity, unspecified dementia type, unspecified whether behavioral, psychotic, or mood disturbance or anxiety   - stable on namenda per PCP    5. Congestive heart failure, unspecified HF chronicity, unspecified heart failure type   - chronic, stable on entresto per cardiology    6. Combined hyperlipidemia associated  with type 2 diabetes mellitus   - stable on statin per cardiology/pcp           Follow up: Follow up if symptoms worsen or fail to improve.    **After visit summary was printed and given to patient upon discharge today.  Patient goals and care plan are included in After Visit Summary.    I have spent a total of 20 minutes on this visit. This includes face to face time and non-face to face time preparing to see the patient (eg, review of tests), obtaining and/or reviewing separately obtained history, documenting clinical information in the electronic or other health record, independently interpreting results and communicating results to the patient/family/caregiver, or care coordinator.              Vu Christiansen, MSN, APRN, FNP-C         [1]   Social History  Socioeconomic History    Marital status:     Number of children: 4   Occupational History    Occupation: Retired   Tobacco Use    Smoking status: Light Smoker     Current packs/day: 0.30     Average packs/day: 0.3 packs/day for 69.6 years (20.9 ttl pk-yrs)     Types: Cigarettes     Start date: 1956    Smokeless tobacco: Never    Tobacco comments:     Smokes 3 to 4 cigarettes a day and some times tries to quit.   Substance and Sexual Activity    Alcohol use: Yes     Comment: 3 beers per month    Drug use: No    Sexual activity: Not Currently     Birth control/protection: Post-menopausal   Social History Narrative    She wears seatbelt. She lives alone and does not currently drive.      Social Drivers of Health     Financial Resource Strain: Low Risk  (12/2/2024)    Overall Financial Resource Strain (CARDIA)     Difficulty of Paying Living Expenses: Not hard at all   Food Insecurity: No Food Insecurity (12/2/2024)    Hunger Vital Sign     Worried About Running Out of Food in the Last Year: Never true     Ran Out of Food in the Last Year: Never true   Transportation Needs: No Transportation Needs (10/3/2023)    PRAPARE - Transportation     Lack of  Transportation (Medical): No     Lack of Transportation (Non-Medical): No   Physical Activity: Inactive (1/30/2025)    Exercise Vital Sign     Days of Exercise per Week: 0 days     Minutes of Exercise per Session: 0 min   Stress: No Stress Concern Present (12/2/2024)    [employer] - Occupational Stress Questionnaire     Feeling of Stress : Not at all   Housing Stability: Low Risk  (10/3/2023)    Housing Stability Vital Sign     Unable to Pay for Housing in the Last Year: No     Number of Places Lived in the Last Year: 1     Unstable Housing in the Last Year: No   [2]   Current Outpatient Medications on File Prior to Visit   Medication Sig Dispense Refill    alcohol swabs PadM Apply 1 each topically as needed. 100 each 11    aspirin (ECOTRIN) 81 MG EC tablet Take 1 tablet (81 mg total) by mouth daily as needed for Pain. 90 tablet 3    atorvastatin (LIPITOR) 80 MG tablet Take 1 tablet (80 mg total) by mouth every evening. 90 tablet 1    baclofen (LIORESAL) 5 mg Tab tablet Take 5 mg by mouth 3 (three) times daily. As needed      empagliflozin (JARDIANCE) 10 mg tablet Take 10 mg by mouth once daily.      ergocalciferol (VITAMIN D2) 50,000 unit Cap Take 50,000 Units by mouth every 14 (fourteen) days.      HYDROcodone-acetaminophen (NORCO)  mg per tablet Take 1 tablet by mouth every 8 (eight) hours as needed.      meloxicam (MOBIC) 15 MG tablet Take 15 mg by mouth daily as needed for Pain (shoulder pain). Prescribed by orthopedist      memantine (NAMENDA) 5 MG Tab TAKE 1 TABLET(5 MG) BY MOUTH TWICE DAILY 180 tablet 1    sacubitriL-valsartan (ENTRESTO) 24-26 mg per tablet Take 1 tablet by mouth 2 (two) times daily.      levocetirizine (XYZAL) 5 MG tablet Take 1 tablet (5 mg total) by mouth every evening. (Patient not taking: Reported on 7/21/2025) 30 tablet 11     No current facility-administered medications on file prior to visit.

## 2025-08-11 ENCOUNTER — OFFICE VISIT (OUTPATIENT)
Dept: FAMILY MEDICINE | Facility: CLINIC | Age: 85
End: 2025-08-11
Attending: FAMILY MEDICINE
Payer: MEDICARE

## 2025-08-11 VITALS
BODY MASS INDEX: 22.1 KG/M2 | TEMPERATURE: 98 F | WEIGHT: 129.44 LBS | DIASTOLIC BLOOD PRESSURE: 70 MMHG | SYSTOLIC BLOOD PRESSURE: 128 MMHG | HEART RATE: 86 BPM | OXYGEN SATURATION: 99 % | HEIGHT: 64 IN

## 2025-08-11 DIAGNOSIS — E11.59 HYPERTENSION ASSOCIATED WITH DIABETES: Primary | ICD-10-CM

## 2025-08-11 DIAGNOSIS — I15.2 HYPERTENSION ASSOCIATED WITH DIABETES: Primary | ICD-10-CM

## 2025-08-11 DIAGNOSIS — F17.200 NEEDS SMOKING CESSATION EDUCATION: ICD-10-CM

## 2025-08-11 DIAGNOSIS — F17.200 TOBACCO USE DISORDER: ICD-10-CM

## 2025-08-11 DIAGNOSIS — E11.51 TYPE 2 DIABETES MELLITUS WITH PERIPHERAL ARTERY DISEASE: ICD-10-CM

## 2025-08-11 DIAGNOSIS — Z78.0 POSTMENOPAUSAL: ICD-10-CM

## 2025-08-11 DIAGNOSIS — E78.2 COMBINED HYPERLIPIDEMIA ASSOCIATED WITH TYPE 2 DIABETES MELLITUS: ICD-10-CM

## 2025-08-11 DIAGNOSIS — K63.5 BENIGN COLON POLYP: ICD-10-CM

## 2025-08-11 DIAGNOSIS — F03.90 DEMENTIA, UNSPECIFIED DEMENTIA SEVERITY, UNSPECIFIED DEMENTIA TYPE, UNSPECIFIED WHETHER BEHAVIORAL, PSYCHOTIC, OR MOOD DISTURBANCE OR ANXIETY: ICD-10-CM

## 2025-08-11 DIAGNOSIS — E11.69 COMBINED HYPERLIPIDEMIA ASSOCIATED WITH TYPE 2 DIABETES MELLITUS: ICD-10-CM

## 2025-08-11 PROCEDURE — G2211 COMPLEX E/M VISIT ADD ON: HCPCS | Mod: HCNC,S$GLB,, | Performed by: FAMILY MEDICINE

## 2025-08-11 PROCEDURE — 3074F SYST BP LT 130 MM HG: CPT | Mod: CPTII,HCNC,S$GLB, | Performed by: FAMILY MEDICINE

## 2025-08-11 PROCEDURE — 99999 PR PBB SHADOW E&M-EST. PATIENT-LVL IV: CPT | Mod: PBBFAC,HCNC,, | Performed by: FAMILY MEDICINE

## 2025-08-11 PROCEDURE — 1126F AMNT PAIN NOTED NONE PRSNT: CPT | Mod: CPTII,HCNC,S$GLB, | Performed by: FAMILY MEDICINE

## 2025-08-11 PROCEDURE — 3288F FALL RISK ASSESSMENT DOCD: CPT | Mod: CPTII,HCNC,S$GLB, | Performed by: FAMILY MEDICINE

## 2025-08-11 PROCEDURE — 99214 OFFICE O/P EST MOD 30 MIN: CPT | Mod: HCNC,S$GLB,, | Performed by: FAMILY MEDICINE

## 2025-08-11 PROCEDURE — 1159F MED LIST DOCD IN RCRD: CPT | Mod: CPTII,HCNC,S$GLB, | Performed by: FAMILY MEDICINE

## 2025-08-11 PROCEDURE — 1101F PT FALLS ASSESS-DOCD LE1/YR: CPT | Mod: CPTII,HCNC,S$GLB, | Performed by: FAMILY MEDICINE

## 2025-08-11 PROCEDURE — 1160F RVW MEDS BY RX/DR IN RCRD: CPT | Mod: CPTII,HCNC,S$GLB, | Performed by: FAMILY MEDICINE

## 2025-08-11 PROCEDURE — 3078F DIAST BP <80 MM HG: CPT | Mod: CPTII,HCNC,S$GLB, | Performed by: FAMILY MEDICINE
